# Patient Record
Sex: FEMALE | Race: BLACK OR AFRICAN AMERICAN | Employment: OTHER | URBAN - METROPOLITAN AREA
[De-identification: names, ages, dates, MRNs, and addresses within clinical notes are randomized per-mention and may not be internally consistent; named-entity substitution may affect disease eponyms.]

---

## 2018-10-25 ENCOUNTER — OP HISTORICAL/CONVERTED ENCOUNTER (OUTPATIENT)
Dept: OTHER | Age: 63
End: 2018-10-25

## 2018-11-07 ENCOUNTER — OP HISTORICAL/CONVERTED ENCOUNTER (OUTPATIENT)
Dept: OTHER | Age: 63
End: 2018-11-07

## 2020-01-21 ENCOUNTER — OP HISTORICAL/CONVERTED ENCOUNTER (OUTPATIENT)
Dept: OTHER | Age: 65
End: 2020-01-21

## 2021-08-05 ENCOUNTER — HOSPITAL ENCOUNTER (EMERGENCY)
Age: 66
Discharge: HOME | End: 2021-08-05
Attending: EMERGENCY MEDICINE
Payer: COMMERCIAL

## 2021-08-05 VITALS
RESPIRATION RATE: 18 BRPM | HEIGHT: 61 IN | HEART RATE: 64 BPM | SYSTOLIC BLOOD PRESSURE: 134 MMHG | TEMPERATURE: 99 F | BODY MASS INDEX: 28.13 KG/M2 | WEIGHT: 149 LBS | DIASTOLIC BLOOD PRESSURE: 74 MMHG | OXYGEN SATURATION: 99 %

## 2021-08-05 DIAGNOSIS — R79.89 ELEVATED SERUM CREATININE: ICD-10-CM

## 2021-08-05 DIAGNOSIS — N13.9 OBSTRUCTIVE UROPATHY: Primary | ICD-10-CM

## 2021-08-05 LAB
ANION GAP SERPL CALC-SCNC: 7 MMOL/L (ref 5–15)
APPEARANCE UR: ABNORMAL
BACTERIA URNS QL MICRO: NEGATIVE /HPF
BACTERIA URNS QL MICRO: NEGATIVE /HPF
BASOPHILS # BLD: 0 K/UL (ref 0–0.1)
BASOPHILS NFR BLD: 0 % (ref 0–1)
BILIRUB UR QL: NEGATIVE
BUN SERPL-MCNC: 47 MG/DL (ref 6–20)
BUN/CREAT SERPL: 9 (ref 12–20)
CA-I BLD-MCNC: 9 MG/DL (ref 8.5–10.1)
CHLORIDE SERPL-SCNC: 111 MMOL/L (ref 97–108)
CO2 SERPL-SCNC: 22 MMOL/L (ref 21–32)
COLOR UR: ABNORMAL
CREAT SERPL-MCNC: 5.51 MG/DL (ref 0.55–1.02)
DIFFERENTIAL METHOD BLD: ABNORMAL
EOSINOPHIL # BLD: 0.1 K/UL (ref 0–0.4)
EOSINOPHIL NFR BLD: 1 % (ref 0–7)
ERYTHROCYTE [DISTWIDTH] IN BLOOD BY AUTOMATED COUNT: 13.8 % (ref 11.5–14.5)
GLUCOSE SERPL-MCNC: 85 MG/DL (ref 65–100)
GLUCOSE UR STRIP.AUTO-MCNC: NEGATIVE MG/DL
HCT VFR BLD AUTO: 32.7 % (ref 35–47)
HGB BLD-MCNC: 10.5 G/DL (ref 11.5–16)
HGB UR QL STRIP: NEGATIVE
IMM GRANULOCYTES # BLD AUTO: 0 K/UL (ref 0–0.04)
IMM GRANULOCYTES NFR BLD AUTO: 0 % (ref 0–0.5)
KETONES UR QL STRIP.AUTO: NEGATIVE MG/DL
LEUKOCYTE ESTERASE UR QL STRIP.AUTO: ABNORMAL
LYMPHOCYTES # BLD: 2.8 K/UL (ref 0.8–3.5)
LYMPHOCYTES NFR BLD: 34 % (ref 12–49)
MCH RBC QN AUTO: 26.4 PG (ref 26–34)
MCHC RBC AUTO-ENTMCNC: 32.1 G/DL (ref 30–36.5)
MCV RBC AUTO: 82.2 FL (ref 80–99)
MONOCYTES # BLD: 0.5 K/UL (ref 0–1)
MONOCYTES NFR BLD: 6 % (ref 5–13)
NEUTS SEG # BLD: 5 K/UL (ref 1.8–8)
NEUTS SEG NFR BLD: 59 % (ref 32–75)
NITRITE UR QL STRIP.AUTO: NEGATIVE
NRBC # BLD: 0 K/UL (ref 0–0.01)
NRBC BLD-RTO: 0 PER 100 WBC
PH UR STRIP: 8 [PH] (ref 5–8)
PLATELET # BLD AUTO: 370 K/UL (ref 150–400)
PMV BLD AUTO: 11.4 FL (ref 8.9–12.9)
POTASSIUM SERPL-SCNC: 6 MMOL/L (ref 3.5–5.1)
POTASSIUM SERPL-SCNC: 6 MMOL/L (ref 3.5–5.1)
POTASSIUM SERPL-SCNC: 6.3 MMOL/L (ref 3.5–5.1)
PROT UR STRIP-MCNC: 100 MG/DL
RBC # BLD AUTO: 3.98 M/UL (ref 3.8–5.2)
RBC #/AREA URNS HPF: ABNORMAL /HPF (ref 0–5)
RBC #/AREA URNS HPF: ABNORMAL /HPF (ref 0–5)
SODIUM SERPL-SCNC: 140 MMOL/L (ref 136–145)
SP GR UR REFRACTOMETRY: 1.01 (ref 1–1.03)
UROBILINOGEN UR QL STRIP.AUTO: 0.1 EU/DL (ref 0.1–1)
WBC # BLD AUTO: 8.4 K/UL (ref 3.6–11)
WBC URNS QL MICRO: >100 /HPF (ref 0–4)
WBC URNS QL MICRO: >100 /HPF (ref 0–4)

## 2021-08-05 PROCEDURE — 84132 ASSAY OF SERUM POTASSIUM: CPT

## 2021-08-05 PROCEDURE — 85025 COMPLETE CBC W/AUTO DIFF WBC: CPT

## 2021-08-05 PROCEDURE — 99284 EMERGENCY DEPT VISIT MOD MDM: CPT

## 2021-08-05 PROCEDURE — 93005 ELECTROCARDIOGRAM TRACING: CPT

## 2021-08-05 PROCEDURE — 81001 URINALYSIS AUTO W/SCOPE: CPT

## 2021-08-05 PROCEDURE — 36415 COLL VENOUS BLD VENIPUNCTURE: CPT

## 2021-08-05 NOTE — ED PROVIDER NOTES
EMERGENCY DEPARTMENT HISTORY AND PHYSICAL EXAM      Date: 8/5/2021  Patient Name: Cesia Verdin    History of Presenting Illness     Chief Complaint   Patient presents with    Urinary Retention       History Provided By: Patient    HPI: Cesia Verdin, 78 y.o. female with a past medical history significant hypertension, renal insufficiency and CHF presents to the ED with cc of urinary retention. Patient states she was told she is not completely emptying her bladder. She does feel the urge to urinate and is able to pass urine. She went to see her nephrologist, Dr. Venancio Dan, and was told that she may need a Hess catheter so she is presents to the emergency department today. Per discussion with Dr. Venancio Dan, patient was admitted to an outside hospital for renal failure with a creatinine up to 4.8. At that time, her bladder volume was over 600 cc. He is concern for obstructive uropathy and that patient may not be completely emptying her bladder so sent here for post void residual to evaluate if patient would benefit from a Hess catheter placement. Patient reports otherwise being in her normal state of health. No recent illnesses. No dysuria, hematuria. There are no other complaints, changes, or physical findings at this time. PCP: Suzanne Pink MD    No current facility-administered medications on file prior to encounter. No current outpatient medications on file prior to encounter. Past History     Past Medical History:  Past Medical History:   Diagnosis Date    Heart failure ([address]     Kidney disease        Past Surgical History:  Past Surgical History:   Procedure Laterality Date    HX KNEE REPLACEMENT Right        Family History:  History reviewed. No pertinent family history.     Social History:  Social History     Tobacco Use    Smoking status: Never Smoker    Smokeless tobacco: Never Used   Substance Use Topics    Alcohol use: Never    Drug use: Never       Allergies:  No Known Allergies      Review of Systems     Review of Systems   Constitutional: Negative for activity change and fever. HENT: Negative for rhinorrhea and sore throat. Eyes: Negative for visual disturbance. Respiratory: Negative for cough. Cardiovascular: Negative for chest pain. Gastrointestinal: Negative for abdominal pain, diarrhea, nausea and vomiting. Genitourinary: Positive for decreased urine volume. Negative for difficulty urinating and dysuria. Musculoskeletal: Negative for arthralgias and myalgias. Skin: Negative for rash and wound. Neurological: Negative for syncope and headaches. Psychiatric/Behavioral: Negative for confusion. All other systems reviewed and are negative. Physical Exam     Physical Exam  Vitals and nursing note reviewed. Constitutional:       Appearance: Normal appearance. She is normal weight. HENT:      Head: Normocephalic and atraumatic. Nose: Nose normal.      Mouth/Throat:      Mouth: Mucous membranes are moist.   Eyes:      Conjunctiva/sclera: Conjunctivae normal.   Cardiovascular:      Rate and Rhythm: Normal rate. Pulses: Normal pulses. Pulmonary:      Effort: Pulmonary effort is normal. No respiratory distress. Musculoskeletal:         General: No swelling or deformity. Normal range of motion. Skin:     General: Skin is warm and dry. Findings: No rash. Neurological:      General: No focal deficit present. Mental Status: She is alert.    Psychiatric:         Mood and Affect: Mood normal.         Behavior: Behavior normal.         Lab and Diagnostic Study Results     Labs -     Recent Results (from the past 12 hour(s))   METABOLIC PANEL, BASIC    Collection Time: 08/05/21  4:15 PM   Result Value Ref Range    Sodium 140 136 - 145 mmol/L    Potassium 6.0 (H) 3.5 - 5.1 mmol/L    Chloride 111 (H) 97 - 108 mmol/L    CO2 22 21 - 32 mmol/L    Anion gap 7 5 - 15 mmol/L    Glucose 85 65 - 100 mg/dL    BUN 47 (H) 6 - 20 mg/dL    Creatinine 5.51 (H) 0.55 - 1.02 mg/dL    BUN/Creatinine ratio 9 (L) 12 - 20      GFR est AA 9 (L) >60 ml/min/1.73m2    GFR est non-AA 8 (L) >60 ml/min/1.73m2    Calcium 9.0 8.5 - 10.1 mg/dL   CBC WITH AUTOMATED DIFF    Collection Time: 08/05/21  4:15 PM   Result Value Ref Range    WBC 8.4 3.6 - 11.0 K/uL    RBC 3.98 3.80 - 5.20 M/uL    HGB 10.5 (L) 11.5 - 16.0 g/dL    HCT 32.7 (L) 35.0 - 47.0 %    MCV 82.2 80.0 - 99.0 FL    MCH 26.4 26.0 - 34.0 PG    MCHC 32.1 30.0 - 36.5 g/dL    RDW 13.8 11.5 - 14.5 %    PLATELET 186 489 - 337 K/uL    MPV 11.4 8.9 - 12.9 FL    NRBC 0.0 0.0  WBC    ABSOLUTE NRBC 0.00 0.00 - 0.01 K/uL    NEUTROPHILS 59 32 - 75 %    LYMPHOCYTES 34 12 - 49 %    MONOCYTES 6 5 - 13 %    EOSINOPHILS 1 0 - 7 %    BASOPHILS 0 0 - 1 %    IMMATURE GRANULOCYTES 0 0 - 0.5 %    ABS. NEUTROPHILS 5.0 1.8 - 8.0 K/UL    ABS. LYMPHOCYTES 2.8 0.8 - 3.5 K/UL    ABS. MONOCYTES 0.5 0.0 - 1.0 K/UL    ABS. EOSINOPHILS 0.1 0.0 - 0.4 K/UL    ABS. BASOPHILS 0.0 0.0 - 0.1 K/UL    ABS. IMM.  GRANS. 0.0 0.00 - 0.04 K/UL    DF AUTOMATED     URINALYSIS W/ RFLX MICROSCOPIC    Collection Time: 08/05/21  4:15 PM   Result Value Ref Range    Color Yellow/Straw      Appearance Turbid (A) Clear      Specific gravity 1.010 1.003 - 1.030      pH (UA) 8.0 5.0 - 8.0      Protein 100 (A) Negative mg/dL    Glucose Negative Negative mg/dL    Ketone Negative Negative mg/dL    Bilirubin Negative Negative      Blood Negative Negative      Urobilinogen 0.1 0.1 - 1.0 EU/dL    Nitrites Negative Negative      Leukocyte Esterase Large (A) Negative      WBC >100 (H) 0 - 4 /hpf    RBC 5-10 0 - 5 /hpf    Bacteria Negative Negative /hpf   URINE MICROSCOPIC    Collection Time: 08/05/21  4:15 PM   Result Value Ref Range    WBC >100 (H) 0 - 4 /hpf    RBC 5-10 0 - 5 /hpf    Bacteria Negative Negative /hpf   POTASSIUM    Collection Time: 08/05/21  5:45 PM   Result Value Ref Range    Potassium 6.3 (H) 3.5 - 5.1 mmol/L   POTASSIUM    Collection Time: 08/05/21  7:30 PM Result Value Ref Range    Potassium 6.0 (H) 3.5 - 5.1 mmol/L       Radiologic Studies -   @lastxrresult@  CT Results  (Last 48 hours)    None        CXR Results  (Last 48 hours)    None            Medical Decision Making   - I am the first provider for this patient. - I reviewed the vital signs, available nursing notes, past medical history, past surgical history, family history and social history. - Initial assessment performed. The patients presenting problems have been discussed, and they are in agreement with the care plan formulated and outlined with them. I have encouraged them to ask questions as they arise throughout their visit. Vital Signs-Reviewed the patient's vital signs. Patient Vitals for the past 12 hrs:   Temp Pulse Resp BP SpO2   08/05/21 2217 -- 64 18 134/74 99 %   08/05/21 2114 99 °F (37.2 °C) -- -- -- --   08/05/21 1552 99.2 °F (37.3 °C) (!) 58 16 137/72 99 %       Records Reviewed: Nursing Notes and Old Medical Records          ED Course:     ED Course as of Aug 06 0041   Thu Aug 05, 2021   128 70-year-old female who presents from nephrology clinic for possible Hess catheter placement secondary to urinary retention. Patient has a history of CKD. She was recently in the hospital 3 weeks ago. She has found then to have a bladder volume of greater than 600. Her nephrologist, Dr. Shanika Zamorano, is concerned that she may have obstructive uropathy and need a Hess catheter for incomplete bladder emptying. He is unable to get a post void residual in his office so was sent here for further evaluation. Will check labs including CBC, BMP, UA and get a postvoid residual.  If elevated, will place a Hess catheter with a leg bag and have patient follow-up closely with urology. Disposition as per clinical course.    [LW]   [phone] Patient unable to urinate. Will attempt again and get bladder volume after second attempt. [LW]   4223 Post void residual is 486mL. K is hemolyzed. Will rpt. [LW]   [phone] with Dr. Drake Toussaint. Would like Tsai placed. Feels this is likely obstructive uropathy. He has started her on medication for hyperkalemia. He will bring her back to repeat labs in a few days in his office. [LW]   2037 Patient of her work-up and results. She is upset by the severity of her renal disease. We discussed the possibility of dialysis if this does not improve but stated that we hope that they tsai will improve this. Potassium is hemolyzed x2. Will repeat. K is elevated and Dr. Drake Toussaint aware, patient started on meds for hyperkalemia. No EKG changes on initial EKG or on repeat. After repeat potassium, patient will discharge home.    [LW]   2121 Spoke w/ Dr. Drake Toussaint again about K of 6.0. No EKG changes. Patient was started on lokelma for hyperkalemia. Discharged to home.    [LW]      ED Course User Index  [LW] Martinez Andino MD         Procedures   Medical Decis      Disposition   Disposition: DC- Adult Discharges: All of the diagnostic tests were reviewed and questions answered. Diagnosis, care plan and treatment options were discussed. The patient understands the instructions and will follow up as directed. The patients results have been reviewed with them. They have been counseled regarding their diagnosis. The patient verbally convey understanding and agreement of the signs, symptoms, diagnosis, treatment and prognosis and additionally agrees to follow up as recommended with their PCP in 24 - 48 hours. They also agree with the care-plan and convey that all of their questions have been answered. I have also put together some discharge instructions for them that include: 1) educational information regarding their diagnosis, 2) how to care for their diagnosis at home, as well a 3) list of reasons why they would want to return to the ED prior to their follow-up appointment, should their condition change. DISCHARGE PLAN:  1.  There are no discharge medications for this patient. 2.   Follow-up Information     Follow up With Specialties Details Why Contact Info    Robert Quintana MD Urology Schedule an appointment as soon as possible for a visit in 1 day  [address] [facility] [address]  [phone]      Pantera Ngo MD Nephrology In 3 days  [address] [facility] [address] [address]  [phone]          3. Return to ED if worse   4. There are no discharge medications for this patient. Diagnosis     Clinical Impression:   1. Obstructive uropathy    2. Elevated serum creatinine        Attestations:    Michele Benson MD    Please note that this dictation was completed with Med Access, the computer voice recognition software. Quite often unanticipated grammatical, syntax, homophones, and other interpretive errors are inadvertently transcribed by the computer software. Please disregard these errors. Please excuse any errors that have escaped final proofreading. Thank you.

## 2021-08-05 NOTE — ED NOTES
Patient has no physical complaints. States her kidney doctor sent her over for  Urinary catheter but patient doesn't know why.  Dr. Trini Viera is nephrologist. Waiting for post residual void but patient does not need to void at this time

## 2021-08-05 NOTE — ED TRIAGE NOTES
Pt sent by Dr. Precious Willingham following appointment due to urinary retention and to have a tsai placed per pt. Pt denies pain and states she has urinated today.

## 2021-08-05 NOTE — DISCHARGE INSTRUCTIONS
Thank you! Thank you for allowing me to care for you in the emergency department. I sincerely hope that you are satisfied with your visit today. It is my goal to provide you with excellent care. Below you will find a list of your labs and imaging from your visit today. Should you have any questions regarding these results please do not hesitate to call the emergency department. Labs -     Recent Results (from the past 12 hour(s))   METABOLIC PANEL, BASIC    Collection Time: 08/05/21  4:15 PM   Result Value Ref Range    Sodium 140 136 - 145 mmol/L    Potassium 6.0 (H) 3.5 - 5.1 mmol/L    Chloride 111 (H) 97 - 108 mmol/L    CO2 22 21 - 32 mmol/L    Anion gap 7 5 - 15 mmol/L    Glucose 85 65 - 100 mg/dL    BUN 47 (H) 6 - 20 mg/dL    Creatinine 5.51 (H) 0.55 - 1.02 mg/dL    BUN/Creatinine ratio 9 (L) 12 - 20      GFR est AA 9 (L) >60 ml/min/1.73m2    GFR est non-AA 8 (L) >60 ml/min/1.73m2    Calcium 9.0 8.5 - 10.1 mg/dL   CBC WITH AUTOMATED DIFF    Collection Time: 08/05/21  4:15 PM   Result Value Ref Range    WBC 8.4 3.6 - 11.0 K/uL    RBC 3.98 3.80 - 5.20 M/uL    HGB 10.5 (L) 11.5 - 16.0 g/dL    HCT 32.7 (L) 35.0 - 47.0 %    MCV 82.2 80.0 - 99.0 FL    MCH 26.4 26.0 - 34.0 PG    MCHC 32.1 30.0 - 36.5 g/dL    RDW 13.8 11.5 - 14.5 %    PLATELET 107 196 - 306 K/uL    MPV 11.4 8.9 - 12.9 FL    NRBC 0.0 0.0  WBC    ABSOLUTE NRBC 0.00 0.00 - 0.01 K/uL    NEUTROPHILS 59 32 - 75 %    LYMPHOCYTES 34 12 - 49 %    MONOCYTES 6 5 - 13 %    EOSINOPHILS 1 0 - 7 %    BASOPHILS 0 0 - 1 %    IMMATURE GRANULOCYTES 0 0 - 0.5 %    ABS. NEUTROPHILS 5.0 1.8 - 8.0 K/UL    ABS. LYMPHOCYTES 2.8 0.8 - 3.5 K/UL    ABS. MONOCYTES 0.5 0.0 - 1.0 K/UL    ABS. EOSINOPHILS 0.1 0.0 - 0.4 K/UL    ABS. BASOPHILS 0.0 0.0 - 0.1 K/UL    ABS. IMM.  GRANS. 0.0 0.00 - 0.04 K/UL    DF AUTOMATED     URINALYSIS W/ RFLX MICROSCOPIC    Collection Time: 08/05/21  4:15 PM   Result Value Ref Range    Color Yellow/Straw      Appearance Turbid (A) Clear Specific gravity 1.010 1.003 - 1.030      pH (UA) 8.0 5.0 - 8.0      Protein 100 (A) Negative mg/dL    Glucose Negative Negative mg/dL    Ketone Negative Negative mg/dL    Bilirubin Negative Negative      Blood Negative Negative      Urobilinogen 0.1 0.1 - 1.0 EU/dL    Nitrites Negative Negative      Leukocyte Esterase Large (A) Negative      WBC >100 (H) 0 - 4 /hpf    RBC 5-10 0 - 5 /hpf    Bacteria Negative Negative /hpf   URINE MICROSCOPIC    Collection Time: 08/05/21  4:15 PM   Result Value Ref Range    WBC >100 (H) 0 - 4 /hpf    RBC 5-10 0 - 5 /hpf    Bacteria Negative Negative /hpf       Radiologic Studies -   No orders to display     CT Results  (Last 48 hours)      None          CXR Results  (Last 48 hours)      None               If you feel that you have not received excellent quality care or timely care, please ask to speak to the nurse manager. Please choose us in the future for your continued health care needs. ------------------------------------------------------------------------------------------------------------  The exam and treatment you received in the Emergency Department were for an urgent problem and are not intended as complete care. It is important that you follow-up with a doctor, nurse practitioner, or physician assistant to:  (1) confirm your diagnosis,  (2) re-evaluation of changes in your illness and treatment, and  (3) for ongoing care. If your symptoms become worse or you do not improve as expected and you are unable to reach your usual health care provider, you should return to the Emergency Department. We are available 24 hours a day. Please take your discharge instructions with you when you go to your follow-up appointment. If you have any problem arranging a follow-up appointment, contact the Emergency Department immediately. If a prescription has been provided, please have it filled as soon as possible to prevent a delay in treatment.  Read the entire medication instruction sheet provided to you by the pharmacy. If you have any questions or reservations about taking the medication due to side effects or interactions with other medications, please call your primary care physician or contact the ER to speak with the charge nurse. Make an appointment with your family doctor or the physician you were referred to for follow-up of this visit as instructed on your discharge paperwork, as this is a mandatory follow-up. Return to the ER if you are unable to be seen or if you are unable to be seen in a timely manner. If you have any problem arranging the follow-up visit, contact the Emergency Department immediately.

## 2021-08-06 LAB
ATRIAL RATE: 82 BPM
CALCULATED P AXIS, ECG09: 67 DEGREES
CALCULATED R AXIS, ECG10: -21 DEGREES
CALCULATED T AXIS, ECG11: 40 DEGREES
DIAGNOSIS, 93000: NORMAL
P-R INTERVAL, ECG05: 172 MS
Q-T INTERVAL, ECG07: 414 MS
QRS DURATION, ECG06: 84 MS
QTC CALCULATION (BEZET), ECG08: 483 MS
VENTRICULAR RATE, ECG03: 82 BPM

## 2021-08-06 NOTE — ED NOTES
Pt urinary cath switched over to leg bag, total output in drain bag is 500ml. No complaints of pain or discomfort verbalized.

## 2021-08-17 ENCOUNTER — TELEPHONE (OUTPATIENT)
Dept: UROLOGY | Age: 66
End: 2021-08-17

## 2021-08-17 PROBLEM — R33.9 URINARY RETENTION: Status: ACTIVE | Noted: 2021-08-17

## 2021-08-17 NOTE — TELEPHONE ENCOUNTER
Dr. Rajinder Melton sent patient to ER for tsai placement. Can we call to see if he has any recent imaging? US or CT? And baseline renal function (creatinine).     [phone]

## 2021-08-31 ENCOUNTER — OFFICE VISIT (OUTPATIENT)
Dept: UROLOGY | Age: 66
End: 2021-08-31
Payer: COMMERCIAL

## 2021-08-31 VITALS
WEIGHT: 144 LBS | OXYGEN SATURATION: 100 % | HEIGHT: 61 IN | DIASTOLIC BLOOD PRESSURE: 88 MMHG | BODY MASS INDEX: 27.19 KG/M2 | SYSTOLIC BLOOD PRESSURE: 152 MMHG | HEART RATE: 78 BPM

## 2021-08-31 DIAGNOSIS — N18.4 STAGE 4 CHRONIC KIDNEY DISEASE (HCC): ICD-10-CM

## 2021-08-31 DIAGNOSIS — R33.9 URINARY RETENTION: Primary | ICD-10-CM

## 2021-08-31 PROCEDURE — 99244 OFF/OP CNSLTJ NEW/EST MOD 40: CPT | Performed by: UROLOGY

## 2021-08-31 RX ORDER — HYDROCHLOROTHIAZIDE 12.5 MG/1
12.5 CAPSULE ORAL DAILY
COMMUNITY

## 2021-08-31 RX ORDER — FERROUS SULFATE 324(65)MG
TABLET, DELAYED RELEASE (ENTERIC COATED) ORAL
COMMUNITY
End: 2021-09-01 | Stop reason: DRUGHIGH

## 2021-08-31 RX ORDER — ERGOCALCIFEROL 1.25 MG/1
50000 CAPSULE ORAL
COMMUNITY
End: 2022-01-13

## 2021-08-31 RX ORDER — SODIUM BICARBONATE 325 MG/1
325 TABLET ORAL 4 TIMES DAILY
COMMUNITY
End: 2021-09-01 | Stop reason: DRUGHIGH

## 2021-08-31 RX ORDER — BETHANECHOL CHLORIDE 10 MG/1
TABLET ORAL
COMMUNITY
End: 2021-09-01 | Stop reason: DRUGHIGH

## 2021-08-31 RX ORDER — ASPIRIN 81 MG/1
TABLET ORAL DAILY
COMMUNITY

## 2021-08-31 RX ORDER — FUROSEMIDE 20 MG/1
TABLET ORAL DAILY
COMMUNITY
End: 2021-09-01 | Stop reason: DRUGHIGH

## 2021-08-31 NOTE — ASSESSMENT & PLAN NOTE
She has CKD and incomplete emptying. She has a tsai. I recommend evaluation with cystoscopy, uroflow, PVR. Catheter change today.

## 2021-08-31 NOTE — LETTER
8/31/2021    Patient: Anabela Deutsch   YOB: 1955   Date of Visit: 8/31/2021     Paramjit Telles MD  [facility] [address]  [facility] [address] [facility] [address]  Via Fax: [address] [place], [place]  [address] [place] [address]  Via Fax: [phone]    Dear MD Darrius Yusuf Arm, MD,      Thank you for referring Ms. Erica Tomas to [facility] for evaluation. My notes for this consultation are attached. If you have questions, please do not hesitate to call me. I look forward to following your patient along with you.       Sincerely,    Lili Varela MD

## 2021-08-31 NOTE — PROGRESS NOTES
HISTORY OF PRESENT ILLNESS  Tanya Amdaor is a 78 y.o. female. Chief Complaint   Patient presents with    New Patient    Urinary Retention     She has a tsai catheter. past medical history significant hypertension, renal insufficiency and CHF presents to the ED 8/6/21 with cc of urinary retention. Patient states she was told she is not completely emptying her bladder. She does feel the urge to urinate and is able to pass urine. She went to see her nephrologist, Dr. Gil Hanna, and was told that she may need a Tsai catheter so she is presents to the emergency department     patient was admitted to an outside hospital for renal failure with a creatinine up to 4.8. At that time, her bladder volume was over 600 cc. He is concern for obstructive uropathy. Cr 5.51 at the ER. She states she was voiding prior to the tsai and did not feel like she could not. She was started on bethanechol a while ago and cannot recall if it changed her voiding. She is also on furosemide and HCTZ every other day. She works in [employer]. Chronic Conditions Addressed Today     1. Urinary retention - Primary     Overview      Patient sent to ER by Dr. Gil Hanna, Nephrology for tsai catheter placement. Per record review, patient was admitted to an outside hospital for renal failure with a creatinine up to 4.8. At that time, her bladder volume was over 600 cc. Dr. Gil Hanna expressed concern for obstructive uropathy. Creatinine 5.51 on 8/5/21. Current Assessment & Plan       She has CKD and incomplete emptying. She has a tsai. I recommend evaluation with cystoscopy, uroflow, PVR. Catheter change today. Relevant Medications     furosemide (LASIX) 20 mg tablet     ferrous sulfate 324 mg (65 mg iron) tablet     hydroCHLOROthiazide (MICROZIDE) 12.5 mg capsule     ergocalciferol (Vitamin D2) 1,250 mcg (50,000 unit) capsule     Other Relevant Orders     METABOLIC PANEL, BASIC     CT ABD PELV WO CONT    2. Stage 4 chronic kidney disease ([facility] [address]     Current Assessment & Plan      She may have obstructive uropathy. Continue tsai for now. Check BMP. Voiding studies. Relevant Medications     furosemide (LASIX) 20 mg tablet     ferrous sulfate 324 mg (65 mg iron) tablet     hydroCHLOROthiazide (MICROZIDE) 12.5 mg capsule     ergocalciferol (Vitamin D2) 1,250 mcg (50,000 unit) capsule     Other Relevant Orders     CT ABD PELV WO CONT          Past Medical History:    PMHx (including negatives):  has a past medical history of Heart failure ([facility] [address] Hypertension, and Kidney disease. PSurgHx:  has a past surgical history that includes hx knee replacement (Right). PSocHx:  reports that she has quit smoking. She quit after 10.00 years of use. She has never used smokeless tobacco. She reports previous alcohol use. She reports that she does not use drugs. Review of Systems   All other systems reviewed and are negative. Physical Exam  Vitals reviewed. Constitutional:       General: She is not in acute distress. Appearance: Normal appearance. She is obese. She is not ill-appearing, toxic-appearing or diaphoretic. HENT:      Head: Normocephalic and atraumatic. Nose: Nose normal.   Eyes:      Conjunctiva/sclera: Conjunctivae normal.      Pupils: Pupils are equal, round, and reactive to light. Pulmonary:      Effort: Pulmonary effort is normal. No respiratory distress. Breath sounds: Normal breath sounds. Musculoskeletal:      Cervical back: Normal range of motion. Neurological:      General: No focal deficit present. Mental Status: She is alert and oriented to person, place, and time. Psychiatric:         Mood and Affect: Mood normal.       No Known Allergies   Prior to Admission medications    Medication Sig Start Date End Date Taking? Authorizing Provider   sodium bicarbonate 325 mg tablet Take 325 mg by mouth four (4) times daily.    Yes Provider, Historical   furosemide (LASIX) 20 mg tablet Take  by mouth daily. Yes Provider, Historical   ferrous sulfate 324 mg (65 mg iron) tablet Take  by mouth Daily (before breakfast). Yes Provider, Historical   bethanechol (URECHOLINE) 10 mg tablet Take  by mouth Before breakfast, lunch, and dinner. Yes Provider, Historical   hydroCHLOROthiazide (MICROZIDE) 12.5 mg capsule Take 12.5 mg by mouth daily. Yes Provider, Historical   aspirin delayed-release 81 mg tablet Take  by mouth daily. Yes Provider, Historical   ergocalciferol (Vitamin D2) 1,250 mcg (50,000 unit) capsule Take 50,000 Units by mouth. Yes Provider, Historical        ASSESSMENT and PLAN  Diagnoses and all orders for this visit:    1. Urinary retention  Assessment & Plan:   She has CKD and incomplete emptying. She has a tsai. I recommend evaluation with cystoscopy, uroflow, PVR. Catheter change today. Orders:  -     METABOLIC PANEL, BASIC  -     CT ABD PELV WO CONT; Future    2. Stage 4 chronic kidney disease ([facility] [address]  Assessment & Plan:  She may have obstructive uropathy. Continue tsai for now. Check BMP. Voiding studies.      Orders:  -     CT ABD PELV WO CONT; Future         Blayne Antunez MD

## 2021-08-31 NOTE — PROGRESS NOTES
Chief Complaint   Patient presents with    New Patient    Urinary Retention     1. Have you been to the ER, urgent care clinic since your last visit? Hospitalized since your last visit? Yes [facility]: [facility]     2. Have you seen or consulted any other health care providers outside of the [address] [place] [address] since your last visit? Include any pap smears or colon screening.  No  Visit Vitals  BP (!) 152/88 (BP 1 Location: Left upper arm, BP Patient Position: Sitting, BP Cuff Size: Adult)   Pulse 78   Ht 5' 1\" (1.549 m)   Wt 144 lb (65.3 kg)   SpO2 100%   BMI 27.21 kg/m²

## 2021-09-01 ENCOUNTER — TELEPHONE (OUTPATIENT)
Dept: UROLOGY | Age: 66
End: 2021-09-01

## 2021-09-01 RX ORDER — FUROSEMIDE 40 MG/1
40 TABLET ORAL DAILY
COMMUNITY
Start: 2021-07-21

## 2021-09-01 RX ORDER — BETHANECHOL CHLORIDE 50 MG/1
TABLET ORAL
COMMUNITY
Start: 2021-08-30

## 2021-09-01 RX ORDER — SODIUM BICARBONATE 650 MG/1
TABLET ORAL
COMMUNITY
Start: 2021-07-21

## 2021-09-01 RX ORDER — FERROUS SULFATE TAB 325 MG (65 MG ELEMENTAL FE) 325 (65 FE) MG
325 TAB ORAL 2 TIMES DAILY
COMMUNITY
Start: 2021-08-16

## 2021-09-01 NOTE — TELEPHONE ENCOUNTER
PT CHYNA and I called her back. She requested the dose on her medication be changed. I went in to her visit and change the dose per her. It is correct now.

## 2021-09-04 LAB
BUN SERPL-MCNC: 35 MG/DL (ref 8–27)
BUN/CREAT SERPL: 9 (ref 12–28)
CALCIUM SERPL-MCNC: 9.2 MG/DL (ref 8.7–10.3)
CHLORIDE SERPL-SCNC: 104 MMOL/L (ref 96–106)
CO2 SERPL-SCNC: 23 MMOL/L (ref 20–29)
CREAT SERPL-MCNC: 4.03 MG/DL (ref 0.57–1)
GLUCOSE SERPL-MCNC: 95 MG/DL (ref 65–99)
POTASSIUM SERPL-SCNC: 6.3 MMOL/L (ref 3.5–5.2)
SODIUM SERPL-SCNC: 139 MMOL/L (ref 134–144)

## 2021-09-09 ENCOUNTER — HOSPITAL ENCOUNTER (OUTPATIENT)
Dept: CT IMAGING | Age: 66
Discharge: HOME | End: 2021-09-09
Attending: UROLOGY
Payer: COMMERCIAL

## 2021-09-09 DIAGNOSIS — N18.4 STAGE 4 CHRONIC KIDNEY DISEASE (HCC): ICD-10-CM

## 2021-09-09 DIAGNOSIS — R33.9 URINARY RETENTION: ICD-10-CM

## 2021-09-09 PROCEDURE — 74176 CT ABD & PELVIS W/O CONTRAST: CPT

## 2021-09-20 NOTE — PROGRESS NOTES
Apt 9/23/21 for cysto, CMG, Uroflow and voiding trial.  Incomplete emptying with DUNG. Fluid attenuation exophytic hypodensity in the right kidney, upper pole  measuring 2.2 cm x 1.9 cm is consistent with a cyst.    A hyperattenuating cortical focus in the left kidney, upper pole measuring 8 mm  is possibly a cyst with proteinaceous or hemorrhagic contents. The bladder is decompressed with a Hess catheter in place. Generalized bladder  wall prominence is noted. No intraluminal calculus is seen.

## 2021-09-21 PROBLEM — N28.1 RENAL CYST: Status: ACTIVE | Noted: 2021-09-21

## 2021-09-23 ENCOUNTER — OFFICE VISIT (OUTPATIENT)
Dept: UROLOGY | Age: 66
End: 2021-09-23
Payer: COMMERCIAL

## 2021-09-23 VITALS
HEIGHT: 61 IN | TEMPERATURE: 96.1 F | HEART RATE: 96 BPM | BODY MASS INDEX: 26.43 KG/M2 | SYSTOLIC BLOOD PRESSURE: 129 MMHG | WEIGHT: 140 LBS | OXYGEN SATURATION: 100 % | DIASTOLIC BLOOD PRESSURE: 72 MMHG

## 2021-09-23 DIAGNOSIS — N30.00 ACUTE CYSTITIS WITHOUT HEMATURIA: ICD-10-CM

## 2021-09-23 DIAGNOSIS — R33.9 URINARY RETENTION: Primary | ICD-10-CM

## 2021-09-23 DIAGNOSIS — N18.4 STAGE 4 CHRONIC KIDNEY DISEASE (HCC): ICD-10-CM

## 2021-09-23 DIAGNOSIS — N28.1 RENAL CYST: ICD-10-CM

## 2021-09-23 LAB
AVG FLOW RATE POC: 4 ML/SECONDS
FLOW TIME POC: 28 SECONDS
MAX FLOW RATE POC: 8 ML/SECONDS
TIME TO MAX, POC: 4 SECONDS
VOIDED VOLUME POC: 111 ML
VOIDING TIME POC: 28 SECONDS

## 2021-09-23 PROCEDURE — 51741 ELECTRO-UROFLOWMETRY FIRST: CPT | Performed by: UROLOGY

## 2021-09-23 PROCEDURE — 51798 US URINE CAPACITY MEASURE: CPT | Performed by: UROLOGY

## 2021-09-23 PROCEDURE — 52000 CYSTOURETHROSCOPY: CPT | Performed by: UROLOGY

## 2021-09-23 PROCEDURE — 51725 SIMPLE CYSTOMETROGRAM: CPT | Performed by: UROLOGY

## 2021-09-23 PROCEDURE — 99214 OFFICE O/P EST MOD 30 MIN: CPT | Performed by: UROLOGY

## 2021-09-23 RX ORDER — ETHYL ALCOHOL 70 %
SOLUTION, NON-ORAL TOPICAL AS NEEDED
COMMUNITY

## 2021-09-23 RX ORDER — METOPROLOL TARTRATE 25 MG/1
TABLET, FILM COATED ORAL 2 TIMES DAILY
COMMUNITY

## 2021-09-23 RX ORDER — CHOLECALCIFEROL (VITAMIN D3) 125 MCG
CAPSULE ORAL
COMMUNITY
End: 2022-01-13

## 2021-09-23 RX ORDER — CEPHALEXIN 500 MG/1
500 CAPSULE ORAL 3 TIMES DAILY
Qty: 15 CAPSULE | Refills: 0 | Status: SHIPPED | OUTPATIENT
Start: 2021-09-23

## 2021-09-23 NOTE — LETTER
9/23/2021    Patient: Abimael Chew   YOB: 1955   Date of Visit: 9/23/2021     Leopoldo Amato, MD  [facility] [address]  [address] [address]  Via Fax: [phone]    Dear Leopoldo Amato, MD,      Thank you for referring Ms. Miguel Wilson to [place] [address] for evaluation. My notes for this consultation are attached. If you have questions, please do not hesitate to call me. I look forward to following your patient along with you.       Sincerely,    Ryan Bartlett MD

## 2021-09-23 NOTE — PROGRESS NOTES
Chief Complaint   Patient presents with    Cystoscopy    Urinary Retention     1. Have you been to the ER, urgent care clinic since your last visit? Hospitalized since your last visit? No    2. Have you seen or consulted any other health care providers outside of the [address] [place] [address] since your last visit? Include any pap smears or colon screening.  No   Visit Vitals  /72 (BP 1 Location: Right upper arm, BP Patient Position: Sitting, BP Cuff Size: Adult)   Pulse 96   Temp (!) 96.1 °F (35.6 °C) (Temporal)   Ht 5' 1\" (1.549 m)   Wt 140 lb (63.5 kg)   SpO2 100%   BMI 26.45 kg/m²

## 2021-09-23 NOTE — ASSESSMENT & PLAN NOTE
No bladder outlet obstruction. Probably some bladder atony. We will see if she can manage with timed voiding.

## 2021-09-23 NOTE — PROGRESS NOTES
HISTORY OF PRESENT ILLNESS  Cesia Verdin is a 78 y.o. female. Chief Complaint   Patient presents with    Cystoscopy    Urinary Retention     Ms. Kevin Buckley was seen at the end of August in referral from her nephrologist, Dr. Venancio Dan. She had DUNG with notable bladder volumes > 600cc. A tsai catheter was placed prior to her visit here. She had a CT done on 9/9/21 that showed bilateral renal cysts with a decompressed bladder (with Tsai) and general bladder wall prominence. She is here today for further evaluation of her voiding patterns via cysto, CMG, Uroflow. Chronic Conditions Addressed Today     1. Urinary retention - Primary     Overview      Patient sent to ER by Dr. Venancio Dan, Nephrology for tsai catheter placement. Per record review, patient was admitted to an outside hospital for renal failure with a creatinine up to 4.8. At that time, her bladder volume was over 600 cc. Dr. Venancio Dan expressed concern for obstructive uropathy. Creatinine 5.51 on 8/5/21.    8/31/21: tsai catheter. Continue while awaiting further evaluation via cysto, CMG, Uroflow. CT 9/9/21: bilateral renal cysts noted, as well as, a decompressed bladder with Tsai catheter in place.  Generalized bladder wall prominence is noted.  No intraluminal calculus is seen. Current Assessment & Plan      She can void more than half her bladder. I recommend timed voiding. BMP today and f/u 1 week. Relevant Medications     cholecalciferol, vitamin D3, (Vitamin D3) 50 mcg (2,000 unit) tab    2. Stage 4 chronic kidney disease ([address]     Overview      8/31/21: obstructive uropathy (?); continue tsai until further evaluation with cysto, CMG, Uroflow. Current Assessment & Plan      No bladder outlet obstruction. Probably some bladder atony. We will see if she can manage with timed voiding.            Relevant Medications     cholecalciferol, vitamin D3, (Vitamin D3) 50 mcg (2,000 unit) tab     Other Relevant Orders     METABOLIC PANEL, BASIC     METABOLIC PANEL, BASIC    3. Renal cyst     Overview      CT 9/9/21: Fluid attenuation exophytic hypodensity in the right kidney, upper pole measuring 2.2 cm x 1.9 cm is consistent with a cyst.   A hyperattenuating cortical focus in the left kidney, upper pole measuring 8 mm is possibly a cyst with proteinaceous or hemorrhagic contents. Current Assessment & Plan      Hyperdense renal cyst <1cm. Follow up imaging 6-12 months. Relevant Medications     cholecalciferol, vitamin D3, (Vitamin D3) 50 mcg (2,000 unit) tab    4. Acute cystitis without hematuria     Current Assessment & Plan       Likely cystitis after catheter. Plan on Keflex. Relevant Medications     cholecalciferol, vitamin D3, (Vitamin D3) 50 mcg (2,000 unit) tab     cephALEXin (KEFLEX) 500 mg capsule               Patient denies the symptoms of COVID-19 per routine screening guidelines. ROS    Past Medical History:  PMHx (including negatives):  has a past medical history of Heart failure ([address] Hypertension, and Kidney disease. PSurgHx:  has a past surgical history that includes hx knee replacement (Right) and hx urological (09/23/2021). PSocHx:  reports that she has quit smoking. She quit after 10.00 years of use. She has never used smokeless tobacco. She reports previous alcohol use. She reports that she does not use drugs. Physical Exam    ASSESSMENT and PLAN  Diagnoses and all orders for this visit:    1. Urinary retention  Assessment & Plan:  She can void more than half her bladder. I recommend timed voiding. BMP today and f/u 1 week. 2. Stage 4 chronic kidney disease ([address]  Assessment & Plan:  No bladder outlet obstruction. Probably some bladder atony. We will see if she can manage with timed voiding. Orders:  -     METABOLIC PANEL, BASIC  -     METABOLIC PANEL, BASIC; Future    3. Renal cyst  Assessment & Plan:  Hyperdense renal cyst <1cm.   Follow up imaging 6-12 months. 4. Acute cystitis without hematuria  Assessment & Plan:   Likely cystitis after catheter. Plan on Keflex. Other orders  -     cephALEXin (KEFLEX) 500 mg capsule; Take 1 Capsule by mouth three (3) times daily. She has a Renal function panel ordered by her nephrologist tomorrow. I will have her do another next week to follow the trend after tsai removal.       Follow-up and Dispositions    · Return in about 3 months (around 12/23/2021).    Follow-up and Disposition History          Blaine Armendariz MD

## 2021-09-23 NOTE — PROGRESS NOTES
Cystoscopy - Female    Findings:  Initial residual: minimal  Anterior urethra: mucosal polyps noted  Bladder neck: Normal appearing  Bladder mucosa: Intact, no bas fond deformity, did not descend with strain  Trabeculation: none  Diverticula: none  Ureteral orifices: normal, efflux clear urine    CMG    Initial urge at (cc): 150  Strong urge at (cc): 250  Findings: No uninhibited contractions noted. No urge related incontinence noted    Uroflow/ PVR    Max Flow: 8 ml/sec    Avg flow: 4 ml/sec    Voided Volume:  111ml    Residual Volume:76ml    Shape of the curve: flattened curve    Impression: bladder capacity normal, low flow with some strain pattern. On bethanechol.   Moderate residual.  Possible detrusor insufficiency

## 2021-09-24 LAB — PVR POC: 76 CC

## 2021-09-30 DIAGNOSIS — N18.4 STAGE 4 CHRONIC KIDNEY DISEASE (HCC): ICD-10-CM

## 2021-10-05 DIAGNOSIS — N18.4 STAGE 4 CHRONIC KIDNEY DISEASE (HCC): Primary | ICD-10-CM

## 2021-10-05 LAB
BUN SERPL-MCNC: 41 MG/DL (ref 8–27)
BUN/CREAT SERPL: 9 (ref 12–28)
CALCIUM SERPL-MCNC: 8.4 MG/DL (ref 8.7–10.3)
CHLORIDE SERPL-SCNC: 101 MMOL/L (ref 96–106)
CO2 SERPL-SCNC: 24 MMOL/L (ref 20–29)
CREAT SERPL-MCNC: 4.67 MG/DL (ref 0.57–1)
GLUCOSE SERPL-MCNC: 92 MG/DL (ref 65–99)
POTASSIUM SERPL-SCNC: 5.4 MMOL/L (ref 3.5–5.2)
SODIUM SERPL-SCNC: 143 MMOL/L (ref 134–144)

## 2021-10-05 NOTE — PROGRESS NOTES
Unable to reach patient. LVM advising on timed voiding with repeat lab. Plan for lab next week with follow up after. Niya- we will need to schedule this. If she does lab on Wednesday, let's aim for Friday apt if you can schedule that.

## 2021-10-11 DIAGNOSIS — N18.4 STAGE 4 CHRONIC KIDNEY DISEASE (HCC): ICD-10-CM

## 2021-11-02 LAB
BUN SERPL-MCNC: 50 MG/DL (ref 8–27)
BUN/CREAT SERPL: 12 (ref 12–28)
CALCIUM SERPL-MCNC: 7.6 MG/DL (ref 8.7–10.3)
CHLORIDE SERPL-SCNC: 103 MMOL/L (ref 96–106)
CO2 SERPL-SCNC: 20 MMOL/L (ref 20–29)
CREAT SERPL-MCNC: 4.08 MG/DL (ref 0.57–1)
GLUCOSE SERPL-MCNC: 79 MG/DL (ref 65–99)
POTASSIUM SERPL-SCNC: 5 MMOL/L (ref 3.5–5.2)
SODIUM SERPL-SCNC: 139 MMOL/L (ref 134–144)

## 2021-11-02 NOTE — PROGRESS NOTES
Renal function is stable after tsai removal.  Have her follow up with Nephrologist as planned, here in 3 months. If nephrologist sees renal function trending upward, have her come in here sooner. She is hard to reach. You may have to leave a voicemail.

## 2022-01-13 ENCOUNTER — OFFICE VISIT (OUTPATIENT)
Dept: UROLOGY | Age: 67
End: 2022-01-13
Payer: COMMERCIAL

## 2022-01-13 VITALS
OXYGEN SATURATION: 97 % | WEIGHT: 135 LBS | DIASTOLIC BLOOD PRESSURE: 87 MMHG | HEIGHT: 59 IN | BODY MASS INDEX: 27.21 KG/M2 | SYSTOLIC BLOOD PRESSURE: 143 MMHG | TEMPERATURE: 96.9 F | HEART RATE: 86 BPM | RESPIRATION RATE: 22 BRPM

## 2022-01-13 DIAGNOSIS — N30.00 ACUTE CYSTITIS WITHOUT HEMATURIA: ICD-10-CM

## 2022-01-13 DIAGNOSIS — R33.9 URINARY RETENTION: Primary | ICD-10-CM

## 2022-01-13 DIAGNOSIS — R33.9 INCOMPLETE EMPTYING OF BLADDER: ICD-10-CM

## 2022-01-13 DIAGNOSIS — N28.1 RENAL CYST: ICD-10-CM

## 2022-01-13 DIAGNOSIS — N18.4 STAGE 4 CHRONIC KIDNEY DISEASE (HCC): ICD-10-CM

## 2022-01-13 LAB
BILIRUB UR QL STRIP: NEGATIVE
GLUCOSE UR-MCNC: NEGATIVE MG/DL
KETONES P FAST UR STRIP-MCNC: NEGATIVE MG/DL
PH UR STRIP: 7 [PH] (ref 4.6–8)
PROT UR QL STRIP: NORMAL
PVR POC: 309 CC
PVR POC: 327 CC
SP GR UR STRIP: 1.01 (ref 1–1.03)
UA UROBILINOGEN AMB POC: NORMAL (ref 0.2–1)
URINALYSIS CLARITY POC: CLEAR
URINALYSIS COLOR POC: YELLOW
URINE BLOOD POC: NORMAL
URINE LEUKOCYTES POC: NORMAL
URINE NITRITES POC: NEGATIVE

## 2022-01-13 PROCEDURE — 99214 OFFICE O/P EST MOD 30 MIN: CPT | Performed by: UROLOGY

## 2022-01-13 PROCEDURE — 51798 US URINE CAPACITY MEASURE: CPT | Performed by: UROLOGY

## 2022-01-13 PROCEDURE — 81003 URINALYSIS AUTO W/O SCOPE: CPT | Performed by: UROLOGY

## 2022-01-13 RX ORDER — CINACALCET 30 MG/1
TABLET, FILM COATED ORAL
COMMUNITY
Start: 2021-11-15

## 2022-01-13 RX ORDER — CALCITRIOL 0.25 UG/1
CAPSULE ORAL
COMMUNITY
Start: 2022-01-07

## 2022-01-13 RX ORDER — FERRIC CITRATE 210 MG/1
TABLET, COATED ORAL
COMMUNITY
Start: 2022-01-07

## 2022-01-13 NOTE — LETTER
1/13/2022    Patient: Gael Lo   YOB: 1955   Date of Visit: 1/13/2022     Sherial Fothergill, MD  [address] [place] [id number]  Via Fax: [phone]    Dear Sherial Fothergill, MD,      Thank you for referring Ms. Joseluis Stephens to [place] [address] for evaluation. My notes for this consultation are attached. If you have questions, please do not hesitate to call me. I look forward to following your patient along with you.       Sincerely,    Zane Foster MD

## 2022-01-13 NOTE — ASSESSMENT & PLAN NOTE
She has history of urinary retention and CKD and is on bethanechol. She is also on Lasix and hydrochlorothiazide. She has chronic incomplete emptying however her kidney function has been stable. She will call with concerns.

## 2022-01-13 NOTE — PROGRESS NOTES
1. Have you been to the ER, urgent care clinic since your last visit? Hospitalized since your last visit? No    2. Have you seen or consulted any other health care providers outside of the [address] [place] [address] since your last visit? Include any pap smears or colon screening.  No  Chief Complaint   Patient presents with    Follow-up    Urinary Retention    Renal Cyst     Visit Vitals  BP (!) 143/87 (BP 1 Location: Left arm, BP Patient Position: Sitting, BP Cuff Size: Adult)   Pulse 86   Temp 96.9 °F (36.1 °C) (Temporal)   Resp 22   Ht 4' 11\" (1.499 m)   Wt 135 lb (61.2 kg)   SpO2 97%   BMI 27.27 kg/m²

## 2022-01-13 NOTE — PROGRESS NOTES
HISTORY OF PRESENT ILLNESS  Ciara Orr is a 78 y.o. female. Chief Complaint   Patient presents with    Follow-up    Urinary Retention    Renal Cyst     Past Medical History:  PMHx (including negatives):  has a past medical history of Heart failure ([address] Hypertension, and Kidney disease. PSurgHx:  has a past surgical history that includes hx knee replacement (Right) and hx urological (09/23/2021). PSocHx:  reports that she has quit smoking. She quit after 10.00 years of use. She has never used smokeless tobacco. She reports previous alcohol use. She reports that she does not use drugs. She had DUNG with notable bladder volumes > 600cc. A tsai catheter was placed prior to her visit here. She had a CT done on 9/9/21 that showed bilateral renal cysts with a decompressed bladder (with Tsai) and general bladder wall prominence. Today her initial bladder scan prior to voiding is 327 mls. She has no real urge to void. She has no voiding discomfort. She denies frequency, urgency or dysuria. She tells me she \"feels the same as I always do. \"  Post void residual was 309 cc.  UA dip today with trace blood and small leukocytes. Urine culture was sent        Chronic Conditions Addressed Today     1. Urinary retention - Primary     Overview      Patient sent to ER by Dr. Francisco Nguyen, Nephrology for tsai catheter placement. Per record review, patient was admitted to an outside hospital for renal failure with a creatinine up to 4.8. At that time, her bladder volume was over 600 cc. Dr. Francisco Nguyen expressed concern for obstructive uropathy. Creatinine 5.51 on 8/5/21.    8/31/21: tsai catheter. Continue while awaiting further evaluation via cysto, CMG, Uroflow. CT 9/9/21: bilateral renal cysts noted, as well as, a decompressed bladder with Tsai catheter in place.  Generalized bladder wall prominence is noted.  No intraluminal calculus is seen. 9/23/21: she can void more than half of her bladder capacity. Instructed on timed voiding. Creatinine ranges 4.03 to 5. 51. Relevant Medications     Auryxia 210 mg iron tablet     calcitRIOL (ROCALTROL) 0.25 mcg capsule     cinacalcet (SENSIPAR) 30 mg tablet    2. Stage 4 chronic kidney disease ([address]     Overview      8/5/21 creatinine was 5.51    8/31/21: obstructive uropathy (?); she is able to void half of her bladder capacity; advised on timed voiding. 9/3/21 creatinine was 4.03    10/4/21 creatinine was 4.67    11/1/21 creatinine was 4.08            Relevant Medications     Auryxia 210 mg iron tablet     calcitRIOL (ROCALTROL) 0.25 mcg capsule     cinacalcet (SENSIPAR) 30 mg tablet    3. Renal cyst     Overview      CT 9/9/21: Fluid attenuation exophytic hypodensity in the right kidney, upper pole measuring 2.2 cm x 1.9 cm is consistent with a cyst.   A hyperattenuating cortical focus in the left kidney, upper pole measuring 8 mm is possibly a cyst with proteinaceous or hemorrhagic contents. Relevant Medications     Auryxia 210 mg iron tablet     calcitRIOL (ROCALTROL) 0.25 mcg capsule     cinacalcet (SENSIPAR) 30 mg tablet    4. Acute cystitis without hematuria     Overview      9/23/21: likely cystitis after tsai catheter. Plan on keflex. Relevant Medications     Auryxia 210 mg iron tablet     calcitRIOL (ROCALTROL) 0.25 mcg capsule     cinacalcet (SENSIPAR) 30 mg tablet               Patient denies the symptoms of COVID-19 per routine screening guidelines. Review of Systems   Constitutional: Negative for chills and fever. HENT: Negative for congestion, sinus pain and sore throat. Respiratory: Negative for sputum production and shortness of breath. Genitourinary: Negative for dysuria, flank pain, frequency, hematuria and urgency. Musculoskeletal: Negative for back pain. Past Medical History:  PMHx (including negatives):  has a past medical history of Heart failure ([address] Hypertension, and Kidney disease.    PSurgHx:  has a past surgical history that includes hx knee replacement (Right) and hx urological (09/23/2021). PSocHx:  reports that she has quit smoking. She quit after 10.00 years of use. She has never used smokeless tobacco. She reports previous alcohol use. She reports that she does not use drugs. Physical Exam    ASSESSMENT and PLAN  Diagnoses and all orders for this visit:    1. Urinary retention  Assessment & Plan:   She has history of urinary retention and CKD and is on bethanechol. She is also on Lasix and hydrochlorothiazide. She has chronic incomplete emptying however her kidney function has been stable. She will call with concerns. Orders:  -     AMB POC PVR, JERRICA,POST-VOID RES,US,NON-IMAGING  -     CULTURE, URINE  -     AMB POC URINALYSIS DIP STICK AUTO W/O MICRO    2. Stage 4 chronic kidney disease (HCC)  Assessment & Plan:  Her renal function is relatively stable. She follows with Dr. Leslee George. 3. Renal cyst    4. Acute cystitis without hematuria  Assessment & Plan:  UA dip with blood and small leukocytes. She has incomplete emptying. She is asymptomatic. Possible contamination. We can hold off on antibiotics in the symptomatic. 5. Incomplete emptying of bladder  Assessment & Plan:   She has history of urinary retention and CKD and is on bethanechol. She is also on Lasix and hydrochlorothiazide. She has chronic incomplete emptying however her kidney function has been stable. She will call with concerns.                Juan David Forrester MD

## 2022-01-13 NOTE — ASSESSMENT & PLAN NOTE
UA dip with blood and small leukocytes. She has incomplete emptying. She is asymptomatic. Possible contamination. We can hold off on antibiotics in the symptomatic.

## 2022-01-15 LAB — BACTERIA UR CULT: NORMAL

## 2022-03-19 PROBLEM — R33.9 INCOMPLETE EMPTYING OF BLADDER: Status: ACTIVE | Noted: 2021-08-17

## 2022-03-19 PROBLEM — N18.4 STAGE 4 CHRONIC KIDNEY DISEASE (HCC): Status: ACTIVE | Noted: 2021-08-31

## 2022-03-19 PROBLEM — N30.00 ACUTE CYSTITIS WITHOUT HEMATURIA: Status: ACTIVE | Noted: 2021-09-23

## 2022-03-19 PROBLEM — N28.1 RENAL CYST: Status: ACTIVE | Noted: 2021-09-21

## 2022-07-22 ENCOUNTER — TRANSCRIBE ORDER (OUTPATIENT)
Dept: SCHEDULING | Age: 67
End: 2022-07-22

## 2022-07-22 DIAGNOSIS — N31.9 HIGH COMPLIANCE BLADDER: ICD-10-CM

## 2022-07-22 DIAGNOSIS — N18.6 END STAGE RENAL DISEASE (HCC): Primary | ICD-10-CM

## 2022-07-22 DIAGNOSIS — R80.9 PROTEINURIA: ICD-10-CM

## 2022-07-22 DIAGNOSIS — R33.9 RETENTION OF URINE, UNSPECIFIED: ICD-10-CM

## 2022-08-24 ENCOUNTER — HOSPITAL ENCOUNTER (OUTPATIENT)
Dept: ULTRASOUND IMAGING | Age: 67
Discharge: HOME | End: 2022-08-24
Payer: COMMERCIAL

## 2022-08-24 DIAGNOSIS — R80.9 PROTEINURIA: ICD-10-CM

## 2022-08-24 DIAGNOSIS — R33.9 RETENTION OF URINE, UNSPECIFIED: ICD-10-CM

## 2022-08-24 DIAGNOSIS — N18.6 END STAGE RENAL DISEASE (HCC): ICD-10-CM

## 2022-08-24 DIAGNOSIS — N31.9 HIGH COMPLIANCE BLADDER: ICD-10-CM

## 2022-08-24 PROCEDURE — 76770 US EXAM ABDO BACK WALL COMP: CPT

## 2022-10-28 ENCOUNTER — TRANSCRIBE ORDER (OUTPATIENT)
Dept: SCHEDULING | Age: 67
End: 2022-10-28

## 2022-10-28 DIAGNOSIS — R33.9 RETENTION OF URINE, UNSPECIFIED: ICD-10-CM

## 2022-10-28 DIAGNOSIS — N31.9 HIGH COMPLIANCE BLADDER: Primary | ICD-10-CM

## 2022-11-18 ENCOUNTER — HOSPITAL ENCOUNTER (OUTPATIENT)
Dept: ULTRASOUND IMAGING | Age: 67
Discharge: HOME | End: 2022-11-18
Attending: INTERNAL MEDICINE
Payer: COMMERCIAL

## 2022-11-18 DIAGNOSIS — N31.9 HIGH COMPLIANCE BLADDER: ICD-10-CM

## 2022-11-18 DIAGNOSIS — R33.9 RETENTION OF URINE, UNSPECIFIED: ICD-10-CM

## 2022-11-18 PROCEDURE — 51798 US URINE CAPACITY MEASURE: CPT

## 2023-02-24 ENCOUNTER — HOSPITAL ENCOUNTER (INPATIENT)
Age: 68
LOS: 3 days | Discharge: HOME HEALTH | DRG: 177 | End: 2023-02-27
Attending: EMERGENCY MEDICINE | Admitting: INTERNAL MEDICINE
Payer: COMMERCIAL

## 2023-02-24 ENCOUNTER — APPOINTMENT (OUTPATIENT)
Dept: GENERAL RADIOLOGY | Age: 68
DRG: 177 | End: 2023-02-24
Attending: EMERGENCY MEDICINE
Payer: COMMERCIAL

## 2023-02-24 ENCOUNTER — APPOINTMENT (OUTPATIENT)
Dept: CT IMAGING | Age: 68
DRG: 177 | End: 2023-02-24
Attending: EMERGENCY MEDICINE
Payer: COMMERCIAL

## 2023-02-24 DIAGNOSIS — N18.6 ESRD ON DIALYSIS (HCC): ICD-10-CM

## 2023-02-24 DIAGNOSIS — J90 PLEURAL EFFUSION: Primary | ICD-10-CM

## 2023-02-24 DIAGNOSIS — Z99.2 ESRD ON DIALYSIS (HCC): ICD-10-CM

## 2023-02-24 DIAGNOSIS — K56.41 FECAL IMPACTION IN RECTUM (HCC): ICD-10-CM

## 2023-02-24 PROBLEM — I21.4 NON-STEMI (NON-ST ELEVATED MYOCARDIAL INFARCTION) (HCC): Status: ACTIVE | Noted: 2023-02-24

## 2023-02-24 PROBLEM — K62.5 RECTAL BLEEDING: Status: ACTIVE | Noted: 2023-02-24

## 2023-02-24 PROBLEM — R19.5 HARD STOOL: Status: ACTIVE | Noted: 2023-02-24

## 2023-02-24 PROBLEM — E86.0 DEHYDRATION: Status: ACTIVE | Noted: 2023-02-24

## 2023-02-24 PROBLEM — R74.01 TRANSAMINITIS: Status: ACTIVE | Noted: 2023-02-24

## 2023-02-24 LAB
ABO + RH BLD: NORMAL
ALBUMIN SERPL-MCNC: 2.1 G/DL (ref 3.5–5)
ALBUMIN/GLOB SERPL: 0.5 (ref 1.1–2.2)
ALP SERPL-CCNC: 199 U/L (ref 45–117)
ALT SERPL-CCNC: 22 U/L (ref 12–78)
ANION GAP SERPL CALC-SCNC: 4 MMOL/L (ref 5–15)
APTT PPP: 29.9 SEC (ref 21.2–34.1)
AST SERPL W P-5'-P-CCNC: 51 U/L (ref 15–37)
ATRIAL RATE: 96 BPM
BASOPHILS # BLD: 0 K/UL (ref 0–0.1)
BASOPHILS NFR BLD: 0 % (ref 0–1)
BILIRUB SERPL-MCNC: 1.1 MG/DL (ref 0.2–1)
BLOOD GROUP ANTIBODIES SERPL: NEGATIVE
BNP SERPL-MCNC: ABNORMAL PG/ML
BUN SERPL-MCNC: 36 MG/DL (ref 6–20)
BUN/CREAT SERPL: 12 (ref 12–20)
CA-I BLD-MCNC: 8.5 MG/DL (ref 8.5–10.1)
CALCULATED P AXIS, ECG09: 49 DEGREES
CALCULATED R AXIS, ECG10: -4 DEGREES
CALCULATED T AXIS, ECG11: -10 DEGREES
CHLORIDE SERPL-SCNC: 94 MMOL/L (ref 97–108)
CO2 SERPL-SCNC: 35 MMOL/L (ref 21–32)
CREAT SERPL-MCNC: 2.9 MG/DL (ref 0.55–1.02)
DIAGNOSIS, 93000: NORMAL
DIFFERENTIAL METHOD BLD: ABNORMAL
EOSINOPHIL # BLD: 0 K/UL (ref 0–0.4)
EOSINOPHIL NFR BLD: 0 % (ref 0–7)
ERYTHROCYTE [DISTWIDTH] IN BLOOD BY AUTOMATED COUNT: 16.8 % (ref 11.5–14.5)
GLOBULIN SER CALC-MCNC: 4.6 G/DL (ref 2–4)
GLUCOSE SERPL-MCNC: 100 MG/DL (ref 65–100)
HCT VFR BLD AUTO: 23.4 % (ref 35–47)
HGB BLD-MCNC: 7.7 G/DL (ref 11.5–16)
IMM GRANULOCYTES # BLD AUTO: 0.1 K/UL (ref 0–0.04)
IMM GRANULOCYTES NFR BLD AUTO: 1 % (ref 0–0.5)
INR PPP: 1.2 (ref 0.9–1.1)
LYMPHOCYTES # BLD: 1 K/UL (ref 0.8–3.5)
LYMPHOCYTES NFR BLD: 10 % (ref 12–49)
MCH RBC QN AUTO: 26.5 PG (ref 26–34)
MCHC RBC AUTO-ENTMCNC: 32.9 G/DL (ref 30–36.5)
MCV RBC AUTO: 80.4 FL (ref 80–99)
MONOCYTES # BLD: 0.7 K/UL (ref 0–1)
MONOCYTES NFR BLD: 7 % (ref 5–13)
NEUTS SEG # BLD: 8.6 K/UL (ref 1.8–8)
NEUTS SEG NFR BLD: 82 % (ref 32–75)
NRBC # BLD: 0 K/UL (ref 0–0.01)
NRBC BLD-RTO: 0 PER 100 WBC
P-R INTERVAL, ECG05: 182 MS
PLATELET # BLD AUTO: 261 K/UL (ref 150–400)
PMV BLD AUTO: 11.4 FL (ref 8.9–12.9)
POTASSIUM SERPL-SCNC: 4.9 MMOL/L (ref 3.5–5.1)
PROT SERPL-MCNC: 6.7 G/DL (ref 6.4–8.2)
PROTHROMBIN TIME: 15 SEC (ref 11.9–14.6)
Q-T INTERVAL, ECG07: 378 MS
QRS DURATION, ECG06: 96 MS
QTC CALCULATION (BEZET), ECG08: 477 MS
RBC # BLD AUTO: 2.91 M/UL (ref 3.8–5.2)
SODIUM SERPL-SCNC: 133 MMOL/L (ref 136–145)
SPECIMEN EXP DATE BLD: NORMAL
THERAPEUTIC RANGE,PTTT: NORMAL SEC (ref 82–109)
TROPONIN I SERPL HS-MCNC: 84 NG/L (ref 0–51)
VENTRICULAR RATE, ECG03: 96 BPM
WBC # BLD AUTO: 10.5 K/UL (ref 3.6–11)

## 2023-02-24 PROCEDURE — 74011250637 HC RX REV CODE- 250/637: Performed by: NURSE PRACTITIONER

## 2023-02-24 PROCEDURE — 99285 EMERGENCY DEPT VISIT HI MDM: CPT

## 2023-02-24 PROCEDURE — 93005 ELECTROCARDIOGRAM TRACING: CPT

## 2023-02-24 PROCEDURE — 71045 X-RAY EXAM CHEST 1 VIEW: CPT

## 2023-02-24 PROCEDURE — 85025 COMPLETE CBC W/AUTO DIFF WBC: CPT

## 2023-02-24 PROCEDURE — 84484 ASSAY OF TROPONIN QUANT: CPT

## 2023-02-24 PROCEDURE — 74011000636 HC RX REV CODE- 636: Performed by: EMERGENCY MEDICINE

## 2023-02-24 PROCEDURE — 74011250636 HC RX REV CODE- 250/636: Performed by: NURSE PRACTITIONER

## 2023-02-24 PROCEDURE — 83880 ASSAY OF NATRIURETIC PEPTIDE: CPT

## 2023-02-24 PROCEDURE — 86900 BLOOD TYPING SEROLOGIC ABO: CPT

## 2023-02-24 PROCEDURE — 74011000250 HC RX REV CODE- 250: Performed by: NURSE PRACTITIONER

## 2023-02-24 PROCEDURE — 74011000250 HC RX REV CODE- 250: Performed by: EMERGENCY MEDICINE

## 2023-02-24 PROCEDURE — 85610 PROTHROMBIN TIME: CPT

## 2023-02-24 PROCEDURE — 74178 CT ABD&PLV WO CNTR FLWD CNTR: CPT

## 2023-02-24 PROCEDURE — 65270000029 HC RM PRIVATE

## 2023-02-24 PROCEDURE — 80053 COMPREHEN METABOLIC PANEL: CPT

## 2023-02-24 PROCEDURE — 85730 THROMBOPLASTIN TIME PARTIAL: CPT

## 2023-02-24 PROCEDURE — 36415 COLL VENOUS BLD VENIPUNCTURE: CPT

## 2023-02-24 RX ORDER — CARVEDILOL 12.5 MG/1
12.5 TABLET ORAL 2 TIMES DAILY
COMMUNITY

## 2023-02-24 RX ORDER — BISACODYL 5 MG
5 TABLET, DELAYED RELEASE (ENTERIC COATED) ORAL DAILY PRN
Status: DISCONTINUED | OUTPATIENT
Start: 2023-02-24 | End: 2023-02-27 | Stop reason: HOSPADM

## 2023-02-24 RX ORDER — SODIUM CHLORIDE 9 MG/ML
100 INJECTION, SOLUTION INTRAVENOUS CONTINUOUS
Status: DISPENSED | OUTPATIENT
Start: 2023-02-24 | End: 2023-02-25

## 2023-02-24 RX ORDER — GLYCERIN 1 G/1
1 SUPPOSITORY RECTAL
Status: DISPENSED | OUTPATIENT
Start: 2023-02-24 | End: 2023-02-25

## 2023-02-24 RX ORDER — SEVELAMER CARBONATE 800 MG/1
1600 TABLET, FILM COATED ORAL 3 TIMES DAILY
Status: DISCONTINUED | OUTPATIENT
Start: 2023-02-24 | End: 2023-02-27 | Stop reason: HOSPADM

## 2023-02-24 RX ORDER — ONDANSETRON 4 MG/1
4 TABLET, ORALLY DISINTEGRATING ORAL
Status: DISCONTINUED | OUTPATIENT
Start: 2023-02-24 | End: 2023-02-27 | Stop reason: HOSPADM

## 2023-02-24 RX ORDER — ONDANSETRON 2 MG/ML
4 INJECTION INTRAMUSCULAR; INTRAVENOUS
Status: DISCONTINUED | OUTPATIENT
Start: 2023-02-24 | End: 2023-02-27 | Stop reason: HOSPADM

## 2023-02-24 RX ORDER — ACETAMINOPHEN 325 MG/1
650 TABLET ORAL
Status: DISCONTINUED | OUTPATIENT
Start: 2023-02-24 | End: 2023-02-27 | Stop reason: HOSPADM

## 2023-02-24 RX ORDER — SODIUM CHLORIDE 0.9 % (FLUSH) 0.9 %
5-40 SYRINGE (ML) INJECTION EVERY 8 HOURS
Status: DISCONTINUED | OUTPATIENT
Start: 2023-02-24 | End: 2023-02-27 | Stop reason: HOSPADM

## 2023-02-24 RX ORDER — SEVELAMER CARBONATE 800 MG/1
1600 TABLET, FILM COATED ORAL 3 TIMES DAILY
COMMUNITY

## 2023-02-24 RX ORDER — CARVEDILOL 12.5 MG/1
12.5 TABLET ORAL 2 TIMES DAILY
Status: DISCONTINUED | OUTPATIENT
Start: 2023-02-24 | End: 2023-02-27 | Stop reason: HOSPADM

## 2023-02-24 RX ORDER — POLYETHYLENE GLYCOL 3350 17 G/17G
17 POWDER, FOR SOLUTION ORAL DAILY
Status: DISCONTINUED | OUTPATIENT
Start: 2023-02-25 | End: 2023-02-27 | Stop reason: HOSPADM

## 2023-02-24 RX ORDER — LIDOCAINE HYDROCHLORIDE 40 MG/ML
SOLUTION TOPICAL
Status: COMPLETED | OUTPATIENT
Start: 2023-02-24 | End: 2023-02-24

## 2023-02-24 RX ORDER — SODIUM CHLORIDE 0.9 % (FLUSH) 0.9 %
5-40 SYRINGE (ML) INJECTION AS NEEDED
Status: DISCONTINUED | OUTPATIENT
Start: 2023-02-24 | End: 2023-02-27 | Stop reason: HOSPADM

## 2023-02-24 RX ORDER — ACETAMINOPHEN 650 MG/1
650 SUPPOSITORY RECTAL
Status: DISCONTINUED | OUTPATIENT
Start: 2023-02-24 | End: 2023-02-27 | Stop reason: HOSPADM

## 2023-02-24 RX ADMIN — SODIUM CHLORIDE, PRESERVATIVE FREE 10 ML: 5 INJECTION INTRAVENOUS at 20:03

## 2023-02-24 RX ADMIN — ONDANSETRON 4 MG: 2 INJECTION INTRAMUSCULAR; INTRAVENOUS at 20:02

## 2023-02-24 RX ADMIN — SEVELAMER CARBONATE 1600 MG: 800 TABLET, FILM COATED ORAL at 20:03

## 2023-02-24 RX ADMIN — SODIUM CHLORIDE 100 ML/HR: 9 INJECTION, SOLUTION INTRAVENOUS at 15:16

## 2023-02-24 RX ADMIN — SODIUM CHLORIDE, PRESERVATIVE FREE 10 ML: 5 INJECTION INTRAVENOUS at 15:15

## 2023-02-24 RX ADMIN — LIDOCAINE HYDROCHLORIDE: 40 SOLUTION TOPICAL at 09:06

## 2023-02-24 RX ADMIN — IOPAMIDOL 100 ML: 755 INJECTION, SOLUTION INTRAVENOUS at 11:43

## 2023-02-24 RX ADMIN — CARVEDILOL 12.5 MG: 12.5 TABLET, FILM COATED ORAL at 20:02

## 2023-02-24 NOTE — H&P
History and Physical    Patient: Henrietta aBrry MRN: [id number]  SSN: [id number]    YOB: 1955  Age: 70 y.o. Sex: female      Subjective:      Henrietta Barry is a 70 y.o. female who has a PMH significant for CKD stage IV, unspecified heart failure, chronic pain status post intrathecal pump. She comes into the emergency room with complaints of rectal pain, unable to pass a hard stool that is located in her rectal vault. She is having bleeding from her rectum. Significant labs on admission hemoglobin is 7.7, sodium 133, chloride 94, CO2 35, anion gap 4, BUN 36, creatinine 2.9, troponin 84 and BNP greater than 35,000. Bilirubin total is 1.1, AST 51 and alkaline phos 199. Chest x-ray reveals elevated right hemidiaphragm with associated right pleural effusion. CTA of abdomen and pelvis negative for gastrointestinal hemorrhage does reveal large right-sided pleural effusion containing loculations of air there is atelectasis of the right middle and right lower lobes, small amount of free fluid in the pelvis. Will admit the patient for further management of dehydration, transaminitis, fluid overload with pleural effusion and right-sided loculated air, fecal impaction in the rectum and non-STEMI. Ordered suppository and enema with good response along with manual digital disimpaction. Will consult pulmonary, cardiology and nephrology. Past Medical History:   Diagnosis Date    Heart failure ([address]     Hypertension     Kidney disease      Past Surgical History:   Procedure Laterality Date    HX KNEE REPLACEMENT Right     HX UROLOGICAL  09/23/2021    cystospoy      Family History   Problem Relation Age of Onset    Hypertension Mother      Social History     Tobacco Use    Smoking status: Former     Years: 10.00     Types: Cigarettes    Smokeless tobacco: Never   Substance Use Topics    Alcohol use: Not Currently      Prior to Admission medications    Medication Sig Start Date End Date Taking? Authorizing Provider   sevelamer carbonate (RENVELA) 800 mg tab tab Take 1,600 mg by mouth three (3) times daily. Yes Provider, Historical   carvediloL (COREG) 12.5 mg tablet Take 12.5 mg by mouth two (2) times a day. Yes Provider, Historical        No Known Allergies    Review of Systems:  Review of Systems   Constitutional:  Positive for malaise/fatigue. Respiratory:  Negative for cough and shortness of breath. Cardiovascular:  Negative for chest pain. Gastrointestinal:  Positive for blood in stool and constipation. Musculoskeletal:  Positive for myalgias. Neurological:  Positive for weakness. Psychiatric/Behavioral:  The patient is nervous/anxious. Objective:     Recent Results (from the past 24 hour(s))   CBC WITH AUTOMATED DIFF    Collection Time: 02/24/23  7:46 AM   Result Value Ref Range    WBC 10.5 3.6 - 11.0 K/uL    RBC 2.91 (L) 3.80 - 5.20 M/uL    HGB 7.7 (L) 11.5 - 16.0 g/dL    HCT 23.4 (L) 35.0 - 47.0 %    MCV 80.4 80.0 - 99.0 FL    MCH 26.5 26.0 - 34.0 PG    MCHC 32.9 30.0 - 36.5 g/dL    RDW 16.8 (H) 11.5 - 14.5 %    PLATELET 510 564 - 561 K/uL    MPV 11.4 8.9 - 12.9 FL    NRBC 0.0 0.0  WBC    ABSOLUTE NRBC 0.00 0.00 - 0.01 K/uL    NEUTROPHILS 82 (H) 32 - 75 %    LYMPHOCYTES 10 (L) 12 - 49 %    MONOCYTES 7 5 - 13 %    EOSINOPHILS 0 0 - 7 %    BASOPHILS 0 0 - 1 %    IMMATURE GRANULOCYTES 1 (H) 0 - 0.5 %    ABS. NEUTROPHILS 8.6 (H) 1.8 - 8.0 K/UL    ABS. LYMPHOCYTES 1.0 0.8 - 3.5 K/UL    ABS. MONOCYTES 0.7 0.0 - 1.0 K/UL    ABS. EOSINOPHILS 0.0 0.0 - 0.4 K/UL    ABS. BASOPHILS 0.0 0.0 - 0.1 K/UL    ABS. IMM.  GRANS. 0.1 (H) 0.00 - 0.04 K/UL    DF AUTOMATED     METABOLIC PANEL, COMPREHENSIVE    Collection Time: 02/24/23  7:46 AM   Result Value Ref Range    Sodium 133 (L) 136 - 145 mmol/L    Potassium 4.9 3.5 - 5.1 mmol/L    Chloride 94 (L) 97 - 108 mmol/L    CO2 35 (H) 21 - 32 mmol/L    Anion gap 4 (L) 5 - 15 mmol/L    Glucose 100 65 - 100 mg/dL    BUN 36 (H) 6 - 20 mg/dL Creatinine 2.90 (H) 0.55 - 1.02 mg/dL    BUN/Creatinine ratio 12 12 - 20      eGFR 17 (L) >60 ml/min/1.73m2    Calcium 8.5 8.5 - 10.1 mg/dL    Bilirubin, total 1.1 (H) 0.2 - 1.0 mg/dL    AST (SGOT) 51 (H) 15 - 37 U/L    ALT (SGPT) 22 12 - 78 U/L    Alk. phosphatase 199 (H) 45 - 117 U/L    Protein, total 6.7 6.4 - 8.2 g/dL    Albumin 2.1 (L) 3.5 - 5.0 g/dL    Globulin 4.6 (H) 2.0 - 4.0 g/dL    A-G Ratio 0.5 (L) 1.1 - 2.2     TROPONIN-HIGH SENSITIVITY    Collection Time: 02/24/23  7:46 AM   Result Value Ref Range    Troponin-High Sensitivity 84 (H) 0 - 51 ng/L   NT-PRO BNP    Collection Time: 02/24/23  7:46 AM   Result Value Ref Range    NT pro-BNP >35,000 (H) <125 pg/mL   PTT    Collection Time: 02/24/23  7:46 AM   Result Value Ref Range    aPTT 29.9 21.2 - 34.1 sec    aPTT, therapeutic range   82 - 109 sec   PROTHROMBIN TIME + INR    Collection Time: 02/24/23  7:46 AM   Result Value Ref Range    Prothrombin time 15.0 (H) 11.9 - 14.6 sec    INR 1.2 (H) 0.9 - 1.1     TYPE & SCREEN    Collection Time: 02/24/23  7:46 AM   Result Value Ref Range    Crossmatch Expiration 02/27/2023,2359     ABO/Rh(D) Shwetha Mock Positive     Antibody screen Negative    EKG, 12 LEAD, INITIAL    Collection Time: 02/24/23  7:57 AM   Result Value Ref Range    Ventricular Rate 96 BPM    Atrial Rate 96 BPM    P-R Interval 182 ms    QRS Duration 96 ms    Q-T Interval 378 ms    QTC Calculation (Bezet) 477 ms    Calculated P Axis 49 degrees    Calculated R Axis -4 degrees    Calculated T Axis -10 degrees    Diagnosis       Sinus rhythm with occasional Premature ventricular complexes  Anterior infarct , age undetermined  Abnormal ECG  No previous ECGs available  Confirmed by Archana Rodas ([id number]) on 2/24/2023 9:42:35 AM          CT ABD PELV W WO CONT   Final Result      1. No CT evidence of active gastrointestinal hemorrhage. 2. Large right-sided pleural effusion containing loculations of air.  There is   atelectasis of the RIGHT middle and RIGHT lower lobes.      3. Small amount of free fluid in the pelvis. 4. Colonic diverticulosis. No evidence of acute diverticulitis. XR CHEST PORT   Final Result   1. Enlarged cardiac silhouette, mild interstitial edema   2. Elevation of the right hemidiaphragm with associated right pleural effusion               Vitals:    02/24/23 0736 02/24/23 0743 02/24/23 0912 02/24/23 1115   BP: 95/61  104/63 111/69   Pulse: 97  97    Resp:  18     Temp: 98.1 °F (36.7 °C)      SpO2: 98%  97% 100%   Weight: 61.2 kg (135 lb)      Height: 5' (1.524 m)           Physical Exam:  Physical Exam  Constitutional:       Appearance: She is ill-appearing. Cardiovascular:      Rate and Rhythm: Regular rhythm. Tachycardia present. Pulmonary:      Effort: No respiratory distress. Abdominal:      General: There is no distension. Musculoskeletal:      Right lower leg: No edema. Left lower leg: No edema. Skin:     General: Skin is dry. Neurological:      Motor: Weakness present. Assessment/Plan:   Active Problems:    Chronic kidney disease (CKD), stage IV (severe) ([facility] (8/31/2021)      Overview: 8/5/21 creatinine was 5.51            8/31/21: obstructive uropathy (?); she is able to void half of her bladder       capacity; advised on timed voiding.             9/3/21 creatinine was 4.03            10/4/21 creatinine was 4.67            11/1/21 creatinine was 4.08            Dehydration (2/24/2023)      Rectal bleeding (2/24/2023)      Pleural effusion (2/24/2023)      Hard stool (2/24/2023)      Non-STEMI (non-ST elevated myocardial infarction) ([facility] (2/24/2023)      Transaminitis (2/24/2023)     Non-STEMI  Elevated troponins  -Continue to trend troponins  -Denies chest pain  -EKG nonischemic  -Consult cardiology    CHF exacerbation  Elevated BNP may be related to renal l status  -No previous echo to review  -Cardiology consulted  -Continue PTA medications Coreg    CKD stage IV  -Consult nephrology    Transaminitis  -Elevated bili, alkalinephos, AST    Right-sided pleural effusion  -CTA of abdomen and pelvis negative for gastrointestinal hemorrhage does reveal large right-sided pleural effusion containing loculations of air there is atelectasis of the right middle and right lower lobes, small amount of free fluid in the pelvis. -Breathing comfortably on room air  -Consult pulmonary for recommendations    Anal pain  Rectal bleeding  -Hard stool noted in rectal vault, relieved with enema and manual disimpaction    Dehydration  BMP sodium and chloride low  Gentle fluid hydration    DVT Prophylaxis: SCDs  Code Status: Full code  POA/NOK:    This care involved high complexity medical decision making: I personally:  Reviewed the flowsheet and previous days notes  Reviewed and summarized records or history from previous days note or discussions with staff, family  High Risk Drug therapy requiring intensive monitoring for toxicity: eg steroids, pressors, antibiotics  Reviewed and/or ordered Clinical lab tests  Reviewed images and/or ordered Radiology tests  Reviewed the patients ECG / Telemetry  discussed my assessment/management with : Nursing, Hospitalist and Family for coordination of care   Total Time spent in direct and indirect care including assessment review of labs and coordination of services and consultations: Greater than 75 minutes    This is dictation was done by dragon, computer voice recognition software. Quite often unanticipated grammatical, syntax, homophones and other interpretive errors or inadvertently transcribed by the computer software. Please excuse errors that have escaped final proofreading. Thank you.      Signed By: Aron Trinidad NP     February 24, 2023

## 2023-02-24 NOTE — PROGRESS NOTES
Admission Medication Reconciliation:    Information obtained from:  Patient    Comments/Recommendations: Reviewed PTA medications and patient's allergies. Removed aspirin 81 mg  Removed auryxia 210 mg  Removed bethanechol 50 mg  Removed calcitriol 0.25 mcg  Removed cinacalcet 30 mg  Removed ferrous sulfate 325 mg  Removed furosemide 80 mg  Removed hctz 12.5 mg  Removed metoprolol tart 25 mg  Removed sodium bicarb 650 mg    Pharmacy: [facility]      Allergies:  Patient has no known allergies. Significant PMH/Disease States:   Past Medical History:   Diagnosis Date    Heart failure ([facility] [address]     Hypertension     Kidney disease      Chief Complaint for this Admission:    Chief Complaint   Patient presents with    Anal Pain     Prior to Admission Medications:   Prior to Admission Medications   Prescriptions Last Dose Informant Patient Reported? Taking?   carvediloL (COREG) 12.5 mg tablet   Yes Yes   Sig: Take 12.5 mg by mouth two (2) times a day. sevelamer carbonate (RENVELA) 800 mg tab tab   Yes Yes   Sig: Take 1,600 mg by mouth three (3) times daily.       Facility-Administered Medications: None       Mere Waller

## 2023-02-24 NOTE — Clinical Note
Status[de-identified] INPATIENT [101]   Type of Bed: Medical [8]   Cardiac Monitoring Required?: Yes   Inpatient Hospitalization Certified Necessary for the Following Reasons: 3. Patient receiving treatment that can only be provided in an inpatient setting (further clarification in H&P documentation)   Admitting Diagnosis: Dehydration [276.51. ICD-9-CM]   Admitting Diagnosis: Pleural effusion [069864]   Admitting Diagnosis: Rectal bleeding [870870]   Admitting Diagnosis: Hard stool [8934988]   Admitting Physician: Cordell Bartlett [[id number]]   Attending Physician: Cordell Bartlett [[id number]]   Estimated Length of Stay: 3-4 Midnights   Discharge Plan[de-identified] Home with Office Follow-up

## 2023-02-24 NOTE — ED PROVIDER NOTES
EMERGENCY DEPARTMENT HISTORY AND PHYSICAL EXAM      Date: 2/24/2023  Patient Name: Daphne Mosher    History of Presenting Illness     Chief Complaint   Patient presents with    Anal Pain       History Provided By: Patient    HPI: Daphne Mosher, 70 y.o. female with a past medical history significant renal insufficiency presents to the ED with chief complaint of Anal Pain  . RECTAL PAIN WITH \"TURD STUCK\"   AND BRBPR            There are no other complaints, changes, or physical findings at this time. PCP: Sheila Silva MD    Current Outpatient Medications   Medication Sig Dispense Refill    Auryxia 210 mg iron tablet       calcitRIOL (ROCALTROL) 0.25 mcg capsule       cinacalcet (SENSIPAR) 30 mg tablet       vitamin a & d (A&D) ointment Apply  to affected area as needed for Skin Irritation. metoprolol tartrate (LOPRESSOR) 25 mg tablet Take  by mouth two (2) times a day. cephALEXin (KEFLEX) 500 mg capsule Take 1 Capsule by mouth three (3) times daily. (Patient not taking: Reported on 1/13/2022) 15 Capsule 0    bethanechol chloride (URECHOLINE) 50 mg tablet       FeroSuL 325 mg (65 mg iron) tablet Take 325 mg by mouth two (2) times a day. furosemide (LASIX) 40 mg tablet Take 40 mg by mouth daily. sodium bicarbonate 650 mg tablet TAKE TWO TABLETS BY MOUTH THREE TIMES A DAY AS DIRECTED      hydroCHLOROthiazide (MICROZIDE) 12.5 mg capsule Take 12.5 mg by mouth daily. aspirin delayed-release 81 mg tablet Take  by mouth daily. Past History     Past Medical History:  Past Medical History:   Diagnosis Date    Heart failure ([address]     Hypertension     Kidney disease        Past Surgical History:  Past Surgical History:   Procedure Laterality Date    HX KNEE REPLACEMENT Right     HX UROLOGICAL  09/23/2021    cystospoy       Family History:  No family history on file.     Social History:  Social History     Tobacco Use    Smoking status: Former     Years: 10.00     Types: Cigarettes    Smokeless tobacco: Never   Vaping Use    Vaping Use: Never used   Substance Use Topics    Alcohol use: Not Currently    Drug use: Never       Allergies:  No Known Allergies      Review of Systems   Review of Systems   Constitutional: Negative. Negative for chills, fatigue and fever. HENT: Negative. Negative for congestion, nosebleeds and sore throat. Eyes: Negative. Negative for pain, discharge and visual disturbance. Respiratory:  Positive for shortness of breath. Negative for cough and chest tightness. Cardiovascular:  Negative for chest pain, palpitations and leg swelling. Gastrointestinal:  Positive for anal bleeding and rectal pain. Negative for abdominal pain, blood in stool, constipation, diarrhea, nausea and vomiting. Endocrine: Negative. Genitourinary: Negative. Negative for difficulty urinating, dysuria, pelvic pain and vaginal bleeding. Musculoskeletal: Negative. Negative for arthralgias, back pain and myalgias. Skin: Negative. Negative for rash and wound. Allergic/Immunologic: Negative. Neurological: Negative. Negative for dizziness, syncope, weakness, numbness and headaches. Hematological: Negative. Psychiatric/Behavioral: Negative. Negative for agitation, confusion and suicidal ideas. All other systems reviewed and are negative. Positives and Pertinent negatives as per HPI. Physical Exam   Patient Vitals for the past 24 hrs:   Temp Pulse Resp BP SpO2   02/24/23 0912 -- 97 -- 104/63 97 %   02/24/23 0743 -- -- 18 -- --   02/24/23 0736 98.1 °F (36.7 °C) 97 -- 95/61 98 %         Physical Exam  Vitals and nursing note reviewed. Exam conducted with a chaperone present. Constitutional:       Appearance: Normal appearance. She is normal weight. HENT:      Head: Normocephalic and atraumatic. Nose: Nose normal.      Mouth/Throat:      Mouth: Mucous membranes are moist.      Pharynx: Oropharynx is clear.    Eyes:      Extraocular Movements: Extraocular movements intact. Conjunctiva/sclera: Conjunctivae normal.      Pupils: Pupils are equal, round, and reactive to light. Cardiovascular:      Rate and Rhythm: Normal rate and regular rhythm. Pulses: Normal pulses. Heart sounds: Normal heart sounds. Pulmonary:      Effort: Respiratory distress present. Breath sounds: Normal breath sounds. Abdominal:      General: Abdomen is flat. Bowel sounds are normal. There is no distension. Palpations: Abdomen is soft. Tenderness: There is no abdominal tenderness. There is no guarding. Comments: ECAL IMPACTION   Musculoskeletal:         General: No swelling, tenderness, deformity or signs of injury. Normal range of motion. Cervical back: Normal range of motion and neck supple. Right lower leg: No edema. Left lower leg: No edema. Skin:     General: Skin is warm and dry. Capillary Refill: Capillary refill takes less than 2 seconds. Findings: No lesion or rash. Neurological:      General: No focal deficit present. Mental Status: She is alert and oriented to person, place, and time. Mental status is at baseline. Cranial Nerves: No cranial nerve deficit. Psychiatric:         Mood and Affect: Mood normal.         Behavior: Behavior normal.         Thought Content:  Thought content normal.         Judgment: Judgment normal.       Diagnostic Study Results     LABS:   Recent Results (from the past 12 hour(s))   CBC WITH AUTOMATED DIFF    Collection Time: 02/24/23  7:46 AM   Result Value Ref Range    WBC 10.5 3.6 - 11.0 K/uL    RBC 2.91 (L) 3.80 - 5.20 M/uL    HGB 7.7 (L) 11.5 - 16.0 g/dL    HCT 23.4 (L) 35.0 - 47.0 %    MCV 80.4 80.0 - 99.0 FL    MCH 26.5 26.0 - 34.0 PG    MCHC 32.9 30.0 - 36.5 g/dL    RDW 16.8 (H) 11.5 - 14.5 %    PLATELET 718 202 - 103 K/uL    MPV 11.4 8.9 - 12.9 FL    NRBC 0.0 0.0  WBC    ABSOLUTE NRBC 0.00 0.00 - 0.01 K/uL    NEUTROPHILS 82 (H) 32 - 75 %    LYMPHOCYTES 10 (L) 12 - 49 %    MONOCYTES 7 5 - 13 %    EOSINOPHILS 0 0 - 7 %    BASOPHILS 0 0 - 1 %    IMMATURE GRANULOCYTES 1 (H) 0 - 0.5 %    ABS. NEUTROPHILS 8.6 (H) 1.8 - 8.0 K/UL    ABS. LYMPHOCYTES 1.0 0.8 - 3.5 K/UL    ABS. MONOCYTES 0.7 0.0 - 1.0 K/UL    ABS. EOSINOPHILS 0.0 0.0 - 0.4 K/UL    ABS. BASOPHILS 0.0 0.0 - 0.1 K/UL    ABS. IMM. GRANS. 0.1 (H) 0.00 - 0.04 K/UL    DF AUTOMATED     METABOLIC PANEL, COMPREHENSIVE    Collection Time: 02/24/23  7:46 AM   Result Value Ref Range    Sodium 133 (L) 136 - 145 mmol/L    Potassium 4.9 3.5 - 5.1 mmol/L    Chloride 94 (L) 97 - 108 mmol/L    CO2 35 (H) 21 - 32 mmol/L    Anion gap 4 (L) 5 - 15 mmol/L    Glucose 100 65 - 100 mg/dL    BUN 36 (H) 6 - 20 mg/dL    Creatinine 2.90 (H) 0.55 - 1.02 mg/dL    BUN/Creatinine ratio 12 12 - 20      eGFR 17 (L) >60 ml/min/1.73m2    Calcium 8.5 8.5 - 10.1 mg/dL    Bilirubin, total 1.1 (H) 0.2 - 1.0 mg/dL    AST (SGOT) 51 (H) 15 - 37 U/L    ALT (SGPT) 22 12 - 78 U/L    Alk.  phosphatase 199 (H) 45 - 117 U/L    Protein, total 6.7 6.4 - 8.2 g/dL    Albumin 2.1 (L) 3.5 - 5.0 g/dL    Globulin 4.6 (H) 2.0 - 4.0 g/dL    A-G Ratio 0.5 (L) 1.1 - 2.2     TROPONIN-HIGH SENSITIVITY    Collection Time: 02/24/23  7:46 AM   Result Value Ref Range    Troponin-High Sensitivity 84 (H) 0 - 51 ng/L   NT-PRO BNP    Collection Time: 02/24/23  7:46 AM   Result Value Ref Range    NT pro-BNP >35,000 (H) <125 pg/mL   PTT    Collection Time: 02/24/23  7:46 AM   Result Value Ref Range    aPTT 29.9 21.2 - 34.1 sec    aPTT, therapeutic range   82 - 109 sec   PROTHROMBIN TIME + INR    Collection Time: 02/24/23  7:46 AM   Result Value Ref Range    Prothrombin time 15.0 (H) 11.9 - 14.6 sec    INR 1.2 (H) 0.9 - 1.1     TYPE & SCREEN    Collection Time: 02/24/23  7:46 AM   Result Value Ref Range    Crossmatch Expiration 02/27/2023,2359     ABO/Rh(D) Sheryl Kwaku Positive     Antibody screen Negative    EKG, 12 LEAD, INITIAL    Collection Time: 02/24/23  7:57 AM   Result Value Ref Range Ventricular Rate 96 BPM    Atrial Rate 96 BPM    P-R Interval 182 ms    QRS Duration 96 ms    Q-T Interval 378 ms    QTC Calculation (Bezet) 477 ms    Calculated P Axis 49 degrees    Calculated R Axis -4 degrees    Calculated T Axis -10 degrees    Diagnosis       Sinus rhythm with occasional Premature ventricular complexes  Anterior infarct , age undetermined  Abnormal ECG  No previous ECGs available  Confirmed by Viki Menendez ([id number]) on 2/24/2023 9:42:35 AM          EKG: EKG interpreted independently by ER physician. RADIOLOGY:  Non-plain film images such as CT, Ultrasound and MRI are read by the radiologist. Plain radiographic images are visualized and preliminarily interpreted by the ED Provider with the below findings:          Interpretation per the Radiologist below, if available at the time of this note:     CT ABD PELV W WO CONT    Result Date: 2/24/2023  EXAM: CT ABD PELV W WO CONT INDICATION: brbpr ESRD on HD COMPARISON: CT of the abdomen/pelvis dated 9 September 2021. IV CONTRAST: 100 mL of Isovue-370. ORAL CONTRAST: None TECHNIQUE:  Multislice helical CT was performed from the diaphragm to the pubic symphysis prior to intravenous contrast administration and from the diaphragm to the symphysis pubis during uneventful rapid bolus intravenous contrast administration. CT acquisitions were performed in the unenhanced, arterial, portal venous, and delayed phases. Contiguous 2.5 mm axial images were reconstructed and lung and soft tissue windows were generated. Coronal and sagittal reformations were generated. CT dose reduction was achieved through use of a standardized protocol tailored for this examination and automatic exposure control for dose modulation. FINDINGS: LOWER THORAX: There is a moderate to large volume right-sided pleural effusion with associated atelectasis of the RIGHT middle and RIGHT lower lobes. There are air loculations within the effusion above the diaphragm.  The heart is moderately enlarged. A central venous catheter tip is seen at the RIGHT atrium. The RIGHT hemidiaphragm is elevated. LIVER: The liver measures 19 cm longitudinal. No abnormally enhancing liver mass is seen. BILIARY TREE: Gallbladder is within normal limits. CBD is not dilated. No calcified gallstone. SPLEEN: within normal limits. PANCREAS: No mass or ductal dilatation. ADRENALS: Unremarkable. KIDNEYS: No mass, calculus, or hydronephrosis. There is a 2 cm nonenhancing simple cyst at the midpole of the RIGHT kidney. STOMACH: Unremarkable. SMALL BOWEL: No dilatation or wall thickening. No CT evidence of active hemorrhage. COLON: No dilatation or wall thickening. No CT evidence of active hemorrhage. There are numerous colonic diverticula, but no evidence of acute diverticulitis. APPENDIX: Not clearly visualized. There is a small amount of free fluid within the RIGHT paracolic gutter. PERITONEUM: There is a small volume of ascites fluid. No pneumoperitoneum. RETROPERITONEUM: No lymphadenopathy or aortic aneurysm. The abdominal aorta is normal in caliber. The celiac, superior mesenteric, and inferior mesenteric arteries are patent. Bilateral common iliac and external iliac arteries are patent. REPRODUCTIVE ORGANS: No adnexal mass. URINARY BLADDER: No mass or calculus. BONES: No destructive bone lesion. ABDOMINAL WALL: No mass or hernia. There is diffuse superficial soft tissue edema. ADDITIONAL COMMENTS: N/A     1. No CT evidence of active gastrointestinal hemorrhage. 2. Large right-sided pleural effusion containing loculations of air. There is atelectasis of the RIGHT middle and RIGHT lower lobes. 3. Small amount of free fluid in the pelvis. 4. Colonic diverticulosis. No evidence of acute diverticulitis. XR CHEST PORT    Result Date: 2/24/2023  INDICATION:  cp EXAM: Single portable view of chest 1040 . Comparison:None Findings: Cardiac silhouette is enlarged. Pulmonary vasculature is prominent.  Small to moderate right pleural effusion. Tunneled right sided central venous catheter. Right hemidiaphragm is elevated. Minimal interstitial prominence. 1. Enlarged cardiac silhouette, mild interstitial edema 2. Elevation of the right hemidiaphragm with associated right pleural effusion      CT Results  (Last 48 hours)                 02/24/23 1143  CT ABD PELV W WO CONT Final result    Impression:      1. No CT evidence of active gastrointestinal hemorrhage. 2. Large right-sided pleural effusion containing loculations of air. There is   atelectasis of the RIGHT middle and RIGHT lower lobes. 3. Small amount of free fluid in the pelvis. 4. Colonic diverticulosis. No evidence of acute diverticulitis. Narrative:  EXAM: CT ABD PELV W WO CONT       INDICATION: brbpr ESRD on HD       COMPARISON: CT of the abdomen/pelvis dated 9 September 2021. IV CONTRAST: 100 mL of Isovue-370. ORAL CONTRAST: None       TECHNIQUE:     Multislice helical CT was performed from the diaphragm to the pubic symphysis   prior to intravenous contrast administration and from the diaphragm to the   symphysis pubis during uneventful rapid bolus intravenous contrast   administration. CT acquisitions were performed in the unenhanced, arterial,   portal venous, and delayed phases. Contiguous 2.5 mm axial images were   reconstructed and lung and soft tissue windows were generated. Coronal and   sagittal reformations were generated. CT dose reduction was achieved through   use of a standardized protocol tailored for this examination and automatic   exposure control for dose modulation. FINDINGS:    LOWER THORAX: There is a moderate to large volume right-sided pleural effusion   with associated atelectasis of the RIGHT middle and RIGHT lower lobes. There are   air loculations within the effusion above the diaphragm. The heart is moderately   enlarged. A central venous catheter tip is seen at the RIGHT atrium.  The RIGHT hemidiaphragm is elevated. LIVER: The liver measures 19 cm longitudinal. No abnormally enhancing liver mass   is seen. BILIARY TREE: Gallbladder is within normal limits. CBD is not dilated. No   calcified gallstone. SPLEEN: within normal limits. PANCREAS: No mass or ductal dilatation. ADRENALS: Unremarkable. KIDNEYS: No mass, calculus, or hydronephrosis. There is a 2 cm nonenhancing   simple cyst at the midpole of the RIGHT kidney. STOMACH: Unremarkable. SMALL BOWEL: No dilatation or wall thickening. No CT evidence of active   hemorrhage. COLON: No dilatation or wall thickening. No CT evidence of active hemorrhage. There are numerous colonic diverticula, but no evidence of acute diverticulitis. APPENDIX: Not clearly visualized. There is a small amount of free fluid within   the RIGHT paracolic gutter. PERITONEUM: There is a small volume of ascites fluid. No pneumoperitoneum. RETROPERITONEUM: No lymphadenopathy or aortic aneurysm. The abdominal aorta is   normal in caliber. The celiac, superior mesenteric, and inferior mesenteric   arteries are patent. Bilateral common iliac and external iliac arteries are   patent. REPRODUCTIVE ORGANS: No adnexal mass. URINARY BLADDER: No mass or calculus. BONES: No destructive bone lesion. ABDOMINAL WALL: No mass or hernia. There is diffuse superficial soft tissue   edema. ADDITIONAL COMMENTS: N/A                 CXR Results  (Last 48 hours)                 02/24/23 1049  XR CHEST PORT Final result    Impression:  1. Enlarged cardiac silhouette, mild interstitial edema   2. Elevation of the right hemidiaphragm with associated right pleural effusion           Narrative:  INDICATION:  cp        EXAM: Single portable view of chest 1040 . Comparison:None       Findings: Cardiac silhouette is enlarged. Pulmonary vasculature is prominent. Small to moderate right pleural effusion. Tunneled right sided central venous   catheter.  Right hemidiaphragm is elevated. Minimal interstitial prominence. Medical Decision Making and ED Course       CC/HPI Summary, DDx, ED Course, and Reassessment: 50-year-old female with multiple complaints including shortness of breath related to fluid overload with end-stage renal disease versus CHF versus pneumonia. With a fecal impaction and bleeding will evaluate for acute GI bleed. Stool removed. And. These orders were placed during the ER evaluation:  Orders Placed This Encounter    XR CHEST PORT     Standing Status:   Standing     Number of Occurrences:   1     Order Specific Question:   Reason for Exam     Answer:   cp    CT ABD PELV W WO CONT     Standing Status:   Standing     Number of Occurrences:   1     Order Specific Question:   Transport     Answer:   BED [2]     Order Specific Question:   Reason for Exam     Answer:   brbpr ESRD on HD     Order Specific Question: This order utilizes IV contrast.  What additional contrast is needed? Answer:   None     Order Specific Question:   Does patient have history of Renal Disease? Answer:   No    CBC WITH AUTOMATED DIFF     Standing Status:   Standing     Number of Occurrences:   1    METABOLIC PANEL, COMPREHENSIVE     Standing Status:   Standing     Number of Occurrences:   1    TROPONIN-HIGH SENSITIVITY     Standing Status:   Standing     Number of Occurrences:   1    BNP (NT-PRO)     Standing Status:   Standing     Number of Occurrences:   1    PTT     Standing Status:   Standing     Number of Occurrences:   1    PROTHROMBIN TIME + INR     Standing Status:   Standing     Number of Occurrences:   1    ENEMA SOAP SUDS     Standing Status:   Standing     Number of Occurrences:   1    EKG 12 LEAD INITIAL     Standing Status:   Standing     Number of Occurrences:   1     Order Specific Question:   Reason for Exam:     Answer:   hypotension    TYPE & SCREEN     ENTER SURGERY DATE IF FOR PRE-OP TESTING.      Standing Status:   Standing Number of Occurrences:   1     Order Specific Question:   Has patient been transfused or pregnant in the last 3 mos. ? Answer:   Unknown    lidocaine (XYLOCAINE) 4 % (40 mg/mL) topical solution    iopamidoL (ISOVUE-370) 370 mg iodine /mL (76 %) injection 100 mL        Patient was given the following medications:  Medications   lidocaine (XYLOCAINE) 4 % (40 mg/mL) topical solution ( Topical Given 2/24/23 0906)   iopamidoL (ISOVUE-370) 370 mg iodine /mL (76 %) injection 100 mL (100 mL IntraVENous Given 2/24/23 1143)           ED Course:       ED Course as of 02/24/23 1337   Fri Feb 24, 2023   0845 NT pro-BNP(!): >35,000 [HP]   0845 Troponin-High Sensitivity(!): 84  Ckd. [HP]   0846 Creatinine(!): 2.90 [HP]   0908 EKG at 757. Normal sinus rhythm rate of 96. No ST changes. Reason rule out dysrhythmia. Interpreted by ER physician. [HP]   1332 Ct: IMPRESSION     1. No CT evidence of active gastrointestinal hemorrhage. 2. Large right-sided pleural effusion containing loculations of air. There is  atelectasis of the RIGHT middle and RIGHT lower lobes. 3. Small amount of free fluid in the pelvis. [HP]      ED Course User Index  [HP] Tay MALONE MD         Vital Signs-Reviewed the patient's vital signs. Patient Vitals for the past 24 hrs:   Temp Pulse Resp BP SpO2   02/24/23 0912 -- 97 -- 104/63 97 %   02/24/23 0743 -- -- 18 -- --   02/24/23 0736 98.1 °F (36.7 °C) 97 -- 95/61 98 %     Vitals interpreted independently by ER physician. STABLE    Medical decision making tools:                No data recorded          Disposition Considerations (Tests not done, Shared Decision Making, Pt Expectation of Test or Tx.): Will admit for dialysis end-stage renal disease with pleural effusion stool impaction has been disimpacted. CONSULTS: (Who and What was discussed)  None    Chronic Conditions: ESRD    Social Determinants affecting Dx or Tx: Patient lacks support at home or lives alone.     Records Reviewed (source and summary of external notes): Prior medical records  Records Reviewed: Previous Hospital chart. EMS run report. I reviewed the vital signs, available nursing notes, past medical history, past surgical history, family history and social history. Initial assessment performed. The patients presenting problems have been discussed, and they are in agreement with the care plan formulated and outlined with them. I have encouraged them to ask questions as they arise throughout their visit. Admitted      Procedures               Disposition       Emergency Department Disposition:  Admitted      Diagnosis     Clinical Impression:   1. Pleural effusion    2. ESRD on dialysis ([facility] [address]    3. Fecal impaction in rectum [facility])        Attestations:    Khadar Kirk MD    Please note that this dictation was completed with [employer], the computer voice recognition software. Quite often unanticipated grammatical, syntax, homophones, and other interpretive errors are inadvertently transcribed by the computer software. Please disregard these errors. Please excuse any errors that have escaped final proofreading. Thank you.

## 2023-02-24 NOTE — ED NOTES
Assumed care of patient. Bedside shift report received from [facility] [employer] Patient resting on stretcher, no acute distress noted. Connected to continuous cardiac and SpO2 monitoring. Currently awaiting glycerin suppository to be available from pharmacy.

## 2023-02-24 NOTE — PROGRESS NOTES
Reason for Admission:  Plural Effusion                      RUR Score: N/A                    Plan for utilizing home health:  Not at this time        PCP: First and Last name:  Natalie Gann MD     Name of Practice:    Are you a current patient: Yes/No:    Approximate date of last visit: Appointment on Feb. 28   Can you participate in a virtual visit with your PCP:                     Current Advanced Directive/Advance Care Plan: Full Code      Healthcare Decision Maker:   Click here to complete [address] including selection of the Healthcare Decision Maker Relationship (ie \"Primary\")     Jose Manuel James, son, [phone]                        Transition of Care Plan:      Met f/f with Pt, she confirmed that the information on the face sheet is correct. Pt stated that she lives alone. No HH, has a walker and independent with ADL. Pt goes to [facility] on T-Th- Sat at 5:00 AM, Pt drivers herself to dialysis. Send RX to [facility] [address]. A friend will transport home. CM dispo: home with no needs.

## 2023-02-24 NOTE — ED TRIAGE NOTES
Patient stated that her rectum was hurting and had rectal bleeding.  She states she \"has a turd in her butt\"

## 2023-02-25 LAB
ANION GAP SERPL CALC-SCNC: 7 MMOL/L (ref 5–15)
ATRIAL RATE: 105 BPM
BUN SERPL-MCNC: 43 MG/DL (ref 6–20)
BUN/CREAT SERPL: 12 (ref 12–20)
CA-I BLD-MCNC: 8.7 MG/DL (ref 8.5–10.1)
CALCULATED P AXIS, ECG09: 39 DEGREES
CALCULATED R AXIS, ECG10: -19 DEGREES
CALCULATED T AXIS, ECG11: -6 DEGREES
CHLORIDE SERPL-SCNC: 96 MMOL/L (ref 97–108)
CO2 SERPL-SCNC: 33 MMOL/L (ref 21–32)
CREAT SERPL-MCNC: 3.58 MG/DL (ref 0.55–1.02)
DIAGNOSIS, 93000: NORMAL
ERYTHROCYTE [DISTWIDTH] IN BLOOD BY AUTOMATED COUNT: 16.8 % (ref 11.5–14.5)
GLUCOSE SERPL-MCNC: 73 MG/DL (ref 65–100)
HCT VFR BLD AUTO: 21.9 % (ref 35–47)
HGB BLD-MCNC: 7.3 G/DL (ref 11.5–16)
MCH RBC QN AUTO: 26.9 PG (ref 26–34)
MCHC RBC AUTO-ENTMCNC: 33.3 G/DL (ref 30–36.5)
MCV RBC AUTO: 80.8 FL (ref 80–99)
NRBC # BLD: 0 K/UL (ref 0–0.01)
NRBC BLD-RTO: 0 PER 100 WBC
P-R INTERVAL, ECG05: 172 MS
PLATELET # BLD AUTO: 255 K/UL (ref 150–400)
PMV BLD AUTO: 11.3 FL (ref 8.9–12.9)
POTASSIUM SERPL-SCNC: 3.9 MMOL/L (ref 3.5–5.1)
Q-T INTERVAL, ECG07: 372 MS
QRS DURATION, ECG06: 86 MS
QTC CALCULATION (BEZET), ECG08: 491 MS
RBC # BLD AUTO: 2.71 M/UL (ref 3.8–5.2)
SODIUM SERPL-SCNC: 136 MMOL/L (ref 136–145)
TROPONIN I SERPL HS-MCNC: 95 NG/L (ref 0–51)
VENTRICULAR RATE, ECG03: 105 BPM
WBC # BLD AUTO: 10.8 K/UL (ref 3.6–11)

## 2023-02-25 PROCEDURE — 74011250636 HC RX REV CODE- 250/636: Performed by: INTERNAL MEDICINE

## 2023-02-25 PROCEDURE — 80048 BASIC METABOLIC PNL TOTAL CA: CPT

## 2023-02-25 PROCEDURE — 36415 COLL VENOUS BLD VENIPUNCTURE: CPT

## 2023-02-25 PROCEDURE — 85027 COMPLETE CBC AUTOMATED: CPT

## 2023-02-25 PROCEDURE — 74011250637 HC RX REV CODE- 250/637: Performed by: NURSE PRACTITIONER

## 2023-02-25 PROCEDURE — 74011250637 HC RX REV CODE- 250/637: Performed by: INTERNAL MEDICINE

## 2023-02-25 PROCEDURE — 87340 HEPATITIS B SURFACE AG IA: CPT

## 2023-02-25 PROCEDURE — 74011250636 HC RX REV CODE- 250/636: Performed by: NURSE PRACTITIONER

## 2023-02-25 PROCEDURE — 65270000029 HC RM PRIVATE

## 2023-02-25 PROCEDURE — 84484 ASSAY OF TROPONIN QUANT: CPT

## 2023-02-25 PROCEDURE — 90935 HEMODIALYSIS ONE EVALUATION: CPT

## 2023-02-25 PROCEDURE — 5A1D70Z PERFORMANCE OF URINARY FILTRATION, INTERMITTENT, LESS THAN 6 HOURS PER DAY: ICD-10-PCS | Performed by: HOSPITALIST

## 2023-02-25 PROCEDURE — 74011250636 HC RX REV CODE- 250/636

## 2023-02-25 PROCEDURE — 87040 BLOOD CULTURE FOR BACTERIA: CPT

## 2023-02-25 PROCEDURE — 74011000250 HC RX REV CODE- 250: Performed by: NURSE PRACTITIONER

## 2023-02-25 RX ORDER — HEPARIN SODIUM 1000 [USP'U]/ML
INJECTION, SOLUTION INTRAVENOUS; SUBCUTANEOUS
Status: COMPLETED
Start: 2023-02-25 | End: 2023-02-25

## 2023-02-25 RX ORDER — HEPARIN SODIUM 1000 [USP'U]/ML
3200 INJECTION, SOLUTION INTRAVENOUS; SUBCUTANEOUS
Status: DISCONTINUED | OUTPATIENT
Start: 2023-02-25 | End: 2023-02-27 | Stop reason: HOSPADM

## 2023-02-25 RX ADMIN — HEPARIN SODIUM 3200 UNITS: 1000 INJECTION, SOLUTION INTRAVENOUS; SUBCUTANEOUS at 17:59

## 2023-02-25 RX ADMIN — HEPARIN SODIUM 3200 UNITS: 1000 INJECTION INTRAVENOUS; SUBCUTANEOUS at 17:59

## 2023-02-25 RX ADMIN — IRON SUCROSE: 20 INJECTION, SOLUTION INTRAVENOUS at 19:00

## 2023-02-25 RX ADMIN — SEVELAMER CARBONATE 1600 MG: 800 TABLET, FILM COATED ORAL at 09:13

## 2023-02-25 RX ADMIN — CARVEDILOL 12.5 MG: 12.5 TABLET, FILM COATED ORAL at 09:14

## 2023-02-25 RX ADMIN — ONDANSETRON 4 MG: 2 INJECTION INTRAMUSCULAR; INTRAVENOUS at 14:23

## 2023-02-25 RX ADMIN — EPOETIN ALFA-EPBX 10000 UNITS: 10000 INJECTION, SOLUTION INTRAVENOUS; SUBCUTANEOUS at 17:59

## 2023-02-25 RX ADMIN — SODIUM CHLORIDE, PRESERVATIVE FREE 10 ML: 5 INJECTION INTRAVENOUS at 20:24

## 2023-02-25 RX ADMIN — POLYETHYLENE GLYCOL 3350 17 G: 17 POWDER, FOR SOLUTION ORAL at 09:13

## 2023-02-25 RX ADMIN — SEVELAMER CARBONATE 1600 MG: 800 TABLET, FILM COATED ORAL at 18:42

## 2023-02-25 RX ADMIN — NEPHROCAP 1 CAPSULE: 1 CAP ORAL at 18:45

## 2023-02-25 RX ADMIN — ONDANSETRON 4 MG: 2 INJECTION INTRAMUSCULAR; INTRAVENOUS at 03:57

## 2023-02-25 RX ADMIN — SODIUM CHLORIDE, PRESERVATIVE FREE 10 ML: 5 INJECTION INTRAVENOUS at 04:00

## 2023-02-25 RX ADMIN — IRON SUCROSE 200 MG: 20 INJECTION, SOLUTION INTRAVENOUS at 18:01

## 2023-02-25 RX ADMIN — SEVELAMER CARBONATE 1600 MG: 800 TABLET, FILM COATED ORAL at 22:41

## 2023-02-25 RX ADMIN — SODIUM CHLORIDE, PRESERVATIVE FREE 10 ML: 5 INJECTION INTRAVENOUS at 14:24

## 2023-02-25 NOTE — CONSULTS
RENAL  Consult appear on list but has not been called yet. She is well-known to our service. Recent new start of dialysis postcardiac catheterization at [facility] several weeks ago. Admitted with rectal pain with fecal impaction. However, noted to have a right pleural effusion. Usual dialysis is Tuesday, Thursday and Saturday. She has very little in the way of interdialytic weight gain. We will plan dialysis tomorrow with ultrafiltration of 1.25 L as tolerated. She does tend to have low blood pressures. Dr. Jovany Swartz will see in a.m.   Dialysis orders written    Jose A Huerta MD

## 2023-02-25 NOTE — CONSULTS
PULMONARY CONSULT  [facility]    Name: Jahaira Chinchilla MRN: [id number]   : 1955 Hospital: [facility]   Date: 2023  Admission date: 2023 Hospital Day: 2       HPI:     Hospital Problems  Date Reviewed: 2022            Codes Class Noted POA    Dehydration ICD-10-CM: E86.0  ICD-9-CM: 276.51  2023 Yes        Rectal bleeding ICD-10-CM: K62.5  ICD-9-CM: 569.3  2023 Yes        Pleural effusion ICD-10-CM: J90  ICD-9-CM: 511.9  2023 Yes        Hard stool ICD-10-CM: R19.5  ICD-9-CM: 787.7  2023 Yes        Non-STEMI (non-ST elevated myocardial infarction) [facility]) ICD-10-CM: I21.4  ICD-9-CM: 410.70  2023 Yes        Transaminitis ICD-10-CM: R74.01  ICD-9-CM: 790.4  2023 Yes        Chronic kidney disease (CKD), stage IV (severe) ([address] ICD-10-CM: N18.4  ICD-9-CM: 585.4  2021 Yes    Overview Addendum 2022 12:38 PM by Blanca Poe NP     21 creatinine was 5.51    21: obstructive uropathy (?); she is able to void half of her bladder capacity; advised on timed voiding. 9/3/21 creatinine was 4.03    10/4/21 creatinine was 4.67    21 creatinine was 4.08                       [x] High complexity decision making was performed  [x] See my orders for details      Subjective/Initial History:     I was asked by Brianda Landis MD to see Jahaira Chinchilla  a 70 y.o.  female in consultation     Excerpts from admission 2023 or consult notes as follows:   69-year-old lady came in because of generalized weakness shortness of breath and she was having rectal pain and constipation and starting having bleeding because of the strain.   She was recently started on dialysis at [facility] after the cardiac catheterization, she came in because of fecal impaction rectal pain chest x-ray and CAT scan of the abdomen was done which shows large right pleural effusion possible loculated and some atelectasis of right middle and right lower lobe she is on room air history of smoking in the past no history of hemoptysis so admitted and pulmonary consult was called      No Known Allergies     MAR reviewed and pertinent medications noted or modified as needed     Current Facility-Administered Medications   Medication    epoetin erich-epbx (RETACRIT) injection 10,000 Units    iron sucrose (VENOFER) injection 200 mg    sodium chloride (NS) flush 5-40 mL    sodium chloride (NS) flush 5-40 mL    acetaminophen (TYLENOL) tablet 650 mg    Or    acetaminophen (TYLENOL) suppository 650 mg    bisacodyL (DULCOLAX) tablet 5 mg    ondansetron (ZOFRAN ODT) tablet 4 mg    Or    ondansetron (ZOFRAN) injection 4 mg    polyethylene glycol (MIRALAX) packet 17 g    carvediloL (COREG) tablet 12.5 mg    sevelamer carbonate (RENVELA) tab 1,600 mg      Patient PCP: Renu Clayton MD  PMH:  has a past medical history of Heart failure ([address] Hypertension, and Kidney disease. PSH:   has a past surgical history that includes hx knee replacement (Right) and hx urological (2021). FHX: family history includes Hypertension in her mother. SHX:  reports that she has quit smoking. She has never used smokeless tobacco. She reports that she does not currently use alcohol. She reports that she does not use drugs. ROS:    Review of Systems   Constitutional:  Positive for malaise/fatigue. HENT: Negative. Eyes: Negative. Respiratory: Negative. Cardiovascular: Negative. Gastrointestinal:  Positive for abdominal pain and blood in stool. Genitourinary: Negative. Musculoskeletal: Negative. Skin: Negative. Neurological: Negative.        Objective:     Vital Signs: Telemetry:    normal sinus rhythm Intake/Output:   Visit Vitals  /66   Pulse 87   Temp 97.6 °F (36.4 °C)   Resp 16   Ht 4' 11\" (1.499 m)   Wt 62.3 kg (137 lb 5.6 oz)   SpO2 97%   BMI 27.74 kg/m²       Temp (24hrs), Av.8 °F (36.6 °C), Min:97.5 °F (36.4 °C), Max:98.1 °F (36.7 °C)        O2 Device: None (Room air)         Wt Readings from Last 4 Encounters:   02/25/23 62.3 kg (137 lb 5.6 oz)   01/13/22 61.2 kg (135 lb)   09/23/21 63.5 kg (140 lb)   08/31/21 65.3 kg (144 lb)        No intake or output data in the 24 hours ending 02/25/23 1032    Last shift:      No intake/output data recorded. Last 3 shifts: No intake/output data recorded. Physical Exam:     Physical Exam  Constitutional:       Appearance: Normal appearance. HENT:      Head: Normocephalic and atraumatic. Nose: Nose normal.      Mouth/Throat:      Mouth: Mucous membranes are moist.   Eyes:      Pupils: Pupils are equal, round, and reactive to light. Cardiovascular:      Rate and Rhythm: Normal rate. Pulses: Normal pulses. Pulmonary:      Effort: Pulmonary effort is normal.      Comments: Diminished breath sound on the right side with dullness on percussion  Abdominal:      General: Abdomen is flat. Musculoskeletal:         General: Normal range of motion. Cervical back: Normal range of motion. Skin:     General: Skin is warm. Neurological:      Mental Status: She is alert. Labs:    Recent Labs     02/25/23  0847 02/24/23  0746   WBC 10.8 10.5   HGB 7.3* 7.7*    261   INR  --  1.2*   APTT  --  29.9     Recent Labs     02/25/23  0847 02/24/23  0746    133*   K 3.9 4.9   CL 96* 94*   CO2 33* 35*   GLU 73 100   BUN 43* 36*   CREA 3.58* 2.90*   CA 8.7 8.5   ALB  --  2.1*   ALT  --  22     No results for input(s): PH, PCO2, PO2, HCO3, FIO2 in the last 72 hours. No results for input(s): CPK, CKNDX, TROIQ in the last 72 hours. No lab exists for component: CPKMB  No results found for: BNPP, BNP   No results found for: CULTNo results found for: TSH, TSHEXT    Imaging:    CXR Results  (Last 48 hours)                 02/24/23 1049  XR CHEST PORT Final result    Impression:  1. Enlarged cardiac silhouette, mild interstitial edema   2.  Elevation of the right hemidiaphragm with associated right pleural effusion           Narrative:  INDICATION:  cp        EXAM: Single portable view of chest 1040 . Comparison:None       Findings: Cardiac silhouette is enlarged. Pulmonary vasculature is prominent. Small to moderate right pleural effusion. Tunneled right sided central venous   catheter. Right hemidiaphragm is elevated. Minimal interstitial prominence. Results from [place] encounter on 02/24/23    XR CHEST PORT    Narrative  INDICATION:  cp    EXAM: Single portable view of chest 1040 . Comparison:None    Findings: Cardiac silhouette is enlarged. Pulmonary vasculature is prominent. Small to moderate right pleural effusion. Tunneled right sided central venous  catheter. Right hemidiaphragm is elevated. Minimal interstitial prominence. Impression  1. Enlarged cardiac silhouette, mild interstitial edema  2. Elevation of the right hemidiaphragm with associated right pleural effusion    Results from Hospital Encounter encounter on 02/24/23    CT ABD PELV W WO CONT    Narrative  EXAM: CT ABD PELV W WO CONT    INDICATION: brbpr ESRD on HD    COMPARISON: CT of the abdomen/pelvis dated 9 September 2021. IV CONTRAST: 100 mL of Isovue-370. ORAL CONTRAST: None    TECHNIQUE:  Multislice helical CT was performed from the diaphragm to the pubic symphysis  prior to intravenous contrast administration and from the diaphragm to the  symphysis pubis during uneventful rapid bolus intravenous contrast  administration. CT acquisitions were performed in the unenhanced, arterial,  portal venous, and delayed phases. Contiguous 2.5 mm axial images were  reconstructed and lung and soft tissue windows were generated. Coronal and  sagittal reformations were generated. CT dose reduction was achieved through  use of a standardized protocol tailored for this examination and automatic  exposure control for dose modulation.     FINDINGS:  LOWER THORAX: There is a moderate to large volume right-sided pleural effusion  with associated atelectasis of the RIGHT middle and RIGHT lower lobes. There are  air loculations within the effusion above the diaphragm. The heart is moderately  enlarged. A central venous catheter tip is seen at the RIGHT atrium. The RIGHT  hemidiaphragm is elevated. LIVER: The liver measures 19 cm longitudinal. No abnormally enhancing liver mass  is seen. BILIARY TREE: Gallbladder is within normal limits. CBD is not dilated. No  calcified gallstone. SPLEEN: within normal limits. PANCREAS: No mass or ductal dilatation. ADRENALS: Unremarkable. KIDNEYS: No mass, calculus, or hydronephrosis. There is a 2 cm nonenhancing  simple cyst at the midpole of the RIGHT kidney. STOMACH: Unremarkable. SMALL BOWEL: No dilatation or wall thickening. No CT evidence of active  hemorrhage. COLON: No dilatation or wall thickening. No CT evidence of active hemorrhage. There are numerous colonic diverticula, but no evidence of acute diverticulitis. APPENDIX: Not clearly visualized. There is a small amount of free fluid within  the RIGHT paracolic gutter. PERITONEUM: There is a small volume of ascites fluid. No pneumoperitoneum. RETROPERITONEUM: No lymphadenopathy or aortic aneurysm. The abdominal aorta is  normal in caliber. The celiac, superior mesenteric, and inferior mesenteric  arteries are patent. Bilateral common iliac and external iliac arteries are  patent. REPRODUCTIVE ORGANS: No adnexal mass. URINARY BLADDER: No mass or calculus. BONES: No destructive bone lesion. ABDOMINAL WALL: No mass or hernia. There is diffuse superficial soft tissue  edema. ADDITIONAL COMMENTS: N/A    Impression  1. No CT evidence of active gastrointestinal hemorrhage. 2. Large right-sided pleural effusion containing loculations of air. There is  atelectasis of the RIGHT middle and RIGHT lower lobes. 3. Small amount of free fluid in the pelvis. 4. Colonic diverticulosis.  No evidence of acute diverticulitis. IMPRESSION:   Large right pleural effusion with loculation  Atelectasis right middle and right lower lobe  Chronic kidney disease stage IV  End-stage renal disease on dialysis  Fluid overload secondary to above  Anemia secondary most likely GI bleed  Pt is requiring Drug therapy requiring intensive monitoring for toxicity  Pt is unstable, unpredictable needing inpatient monitoring; is acutely ill and at high risk of sudden decline and decompensation with severe consequenses and continued end organ dysfunction and failure  Prognosis guarded       RECOMMENDATIONS/PLAN:     58-year-old lady came in because of rectal pain because of constipation  CAT scan of the abdomen shows large right pleural effusion with some loculation  Get IR for diagnostic and therapeutic thoracentesis after repeating chest x-ray after dialysis on Monday  Continue with the dialysis  Constipation continue with a stool softener follow-up hemoglobin  NP is greater than 35,000 secondary to renal failure which is chronic  Supplemental O2 to keep sats > 93%  Aspiration precautions  Labs to follow electrolytes, renal function and and blood counts  Glucose monitoring and SSI  Bronchial hygiene with respiratory therapy techniques, bronchodilators  DVT, SUP prophylaxis       This care involved high complexity medical decision making: I personally:  Reviewed the flowsheet and previous days notes  Reviewed and summarized records or history from previous days note or discussions with staff, family  High Risk Drug therapy requiring intensive monitoring for toxicity: eg steroids, pressors, antibiotics  Reviewed and/or ordered Clinical lab tests  Reviewed images and/or ordered Radiology tests  Reviewed the patients ECG / Telemetry  Reviewed and/or adjusted NiPPV settings  Called and arranged for Radiologic procedures or interventions  performed or ordered Diagnostic endoscopies with identified risk factors.   discussed my assessment/management with : Nursing, Hospitalist and Family for coordination of care          Paula Del Valle MD

## 2023-02-25 NOTE — DIALYSIS
Pt soila 3 hour hemodialysis well, 1.25 liters net fluid removed. Epogen 86307 units given IV. Venofer 200mg given IV. Report called to [employer]. Lewis Pisano in telemetry notified pt enroute  to her room, box #42.

## 2023-02-25 NOTE — ROUTINE PROCESS
Dual skin assessment done with Paulette Redo. Skin clean, dry and intact. No skin injuries assessed.

## 2023-02-25 NOTE — PROGRESS NOTES
Progress Note    Patient: Abimael Valdez MRN: [id number]  SSN: [id number]    YOB: 1955  Age: 70 y.o. Sex: female      Admit Date: 2/24/2023    LOS: 1 day     Subjective:     Abimael Valdez is a 70 y.o. female who has a PMH significant for CKD stage IV, unspecified heart failure, chronic pain status post intrathecal pump. She comes into the emergency room with complaints of rectal pain, unable to pass a hard stool that is located in her rectal vault. She is having bleeding from her rectum. Significant labs on admission hemoglobin is 7.7, sodium 133, chloride 94, CO2 35, anion gap 4, BUN 36, creatinine 2.9, troponin 84 and BNP greater than 35,000. Bilirubin total is 1.1, AST 51 and alkaline phos 199. Chest x-ray reveals elevated right hemidiaphragm with associated right pleural effusion. CTA of abdomen and pelvis negative for gastrointestinal hemorrhage does reveal large right-sided pleural effusion containing loculations of air there is atelectasis of the right middle and right lower lobes, small amount of free fluid in the pelvis. Will admit the patient for further management of dehydration, transaminitis, fluid overload with pleural effusion and right-sided loculated air, fecal impaction in the rectum and non-STEMI. Ordered suppository and enema with good response along with manual digital disimpaction. Will consult pulmonary, cardiology and nephrology. Review of Systems:  Review of Systems   Constitutional:  Positive for malaise/fatigue. Respiratory:  Negative for cough and shortness of breath. Cardiovascular:  Negative for chest pain. Gastrointestinal:  Positive for blood in stool and constipation. Musculoskeletal:  Positive for myalgias. Neurological:  Positive for weakness. Psychiatric/Behavioral:  The patient is nervous/anxious.             Objective:     Vitals:    02/25/23 0439 02/25/23 0712 02/25/23 0921 02/25/23 1152   BP: 107/73 94/66 106/66 100/64   Pulse: 90 87  85   Resp: 15 16  18   Temp: 97.5 °F (36.4 °C) 97.6 °F (36.4 °C)  97.5 °F (36.4 °C)   SpO2: 99% 97%  96%   Weight:       Height:            Intake and Output:  Current Shift: No intake/output data recorded. Last three shifts: No intake/output data recorded. Physical Exam:  Physical Exam  Constitutional:       Appearance: She is ill-appearing. Cardiovascular:      Rate and Rhythm: Regular rhythm. Tachycardia present. Pulmonary:      Effort: No respiratory distress. Abdominal:      General: There is no distension. Musculoskeletal:      Right lower leg: No edema. Left lower leg: No edema. Skin:     General: Skin is dry. Neurological:      Motor: Weakness present.            Lab/Data Review:  Recent Results (from the past 24 hour(s))   METABOLIC PANEL, BASIC    Collection Time: 02/25/23  8:47 AM   Result Value Ref Range    Sodium 136 136 - 145 mmol/L    Potassium 3.9 3.5 - 5.1 mmol/L    Chloride 96 (L) 97 - 108 mmol/L    CO2 33 (H) 21 - 32 mmol/L    Anion gap 7 5 - 15 mmol/L    Glucose 73 65 - 100 mg/dL    BUN 43 (H) 6 - 20 mg/dL    Creatinine 3.58 (H) 0.55 - 1.02 mg/dL    BUN/Creatinine ratio 12 12 - 20      eGFR 13 (L) >60 ml/min/1.73m2    Calcium 8.7 8.5 - 10.1 mg/dL   CBC W/O DIFF    Collection Time: 02/25/23  8:47 AM   Result Value Ref Range    WBC 10.8 3.6 - 11.0 K/uL    RBC 2.71 (L) 3.80 - 5.20 M/uL    HGB 7.3 (L) 11.5 - 16.0 g/dL    HCT 21.9 (L) 35.0 - 47.0 %    MCV 80.8 80.0 - 99.0 FL    MCH 26.9 26.0 - 34.0 PG    MCHC 33.3 30.0 - 36.5 g/dL    RDW 16.8 (H) 11.5 - 14.5 %    PLATELET 201 501 - 283 K/uL    MPV 11.3 8.9 - 12.9 FL    NRBC 0.0 0.0  WBC    ABSOLUTE NRBC 0.00 0.00 - 0.01 K/uL   TROPONIN-HIGH SENSITIVITY    Collection Time: 02/25/23  8:47 AM   Result Value Ref Range    Troponin-High Sensitivity 95 (H) 0 - 51 ng/L         Assessment and plan:        Non-STEMI  Elevated troponins  -Continue to trend troponins  -Denies chest pain  -EKG nonischemic  -Consult cardiology CHF exacerbation  Elevated BNP may be related to renal l status  -No previous echo to review  -Cardiology consulted  -Continue PTA medications Coreg     CKD stage IV  -Consult nephrology     Transaminitis  -Elevated bili, alkalinephos, AST     Right-sided pleural effusion  -CTA of abdomen and pelvis negative for gastrointestinal hemorrhage does reveal large right-sided pleural effusion containing loculations of air there is atelectasis of the right middle and right lower lobes, small amount of free fluid in the pelvis.   -Breathing comfortably on room air  -Consult pulmonary for recommendations     Anal pain  Rectal bleeding  -Hard stool noted in rectal vault, relieved with enema and manual disimpaction     Dehydration  BMP sodium and chloride low  Gentle fluid hydration    DISPOSITION: IR for thoracenthesis on monday     DVT Prophylaxis: SCDs  Code Status: Full code  POA/NOK:  Signed By: Tong Choe MD     February 25, 2023

## 2023-02-25 NOTE — CONSULTS
NEPHROLOGY CONSULT NOTE     Patient: Alix Mccauley MRN: [id number]  PCP: Olivia Gonzalez MD   :     1955  Age:   70 y.o. Sex:  female      Referring physician: Edda Upton MD    Reason for consultation:     End-stage renal disease on hemodialysis. Ms. Precious Williamson was seen and examined in the dialysis suite at [facility] this afternoon. Admission Date: 2023  7:32 AM  LOS: 1 day     DISCUSSION / PLAN :   Complete dialysis today. Hemodialysis will provide clearance, help to maintain normokalemia and her volume status. Ms. Precious Williamson is on a  dialysis schedule. Her next dialysis session will be scheduled for  unless there are intervening indications. Epogen will be provided for anemia of chronic kidney disease. The target hemoglobin level is 10 to 11 g/dL. Add renal caps-1 orally once daily. Further GI work-up will be deferred to the primary team.    The pulmonary consultant is on board to address the pleural effusion. Medication should be dosed for her GFR. Follow CBC and electrolytes. Active Problems / Assessment AAActive  : Active Problems:    Chronic kidney disease (CKD), stage IV (severe) ([facility] [address] (2021)      Overview: 21 creatinine was 5.51            21: obstructive uropathy (?); she is able to void half of her bladder       capacity; advised on timed voiding. 9/3/21 creatinine was 4.03            10/4/21 creatinine was 4.67            21 creatinine was 4.08            Dehydration (2023)      Rectal bleeding (2023)      Pleural effusion (2023)      Hard stool (2023)      Non-STEMI (non-ST elevated myocardial infarction) ([facility] [address] (2023)      Transaminitis (2023)         Subjective:       \" I had a bowel movement \"    HPI:     Ms. Precious Williamson is a 78-year-old lady who is well-known to our service. She is followed by my partner Dr. Charan Hernandez.   Her comorbidities include end-stage renal disease, heart failure with reduced ejection fraction, neurogenic bladder, hypertension and anemia of chronic kidney disease. She started maintenance hemodialysis earlier this month at [facility]. She is on a Tuesday Thursday Saturday dialysis schedule at the [facility] dialysis clinic. MS Stormy Price reported an episode of lower gastrointestinal bleeding. There was no suggestion of significant abdominal pain. There is no history of chest pain, shortness of breath, hemoptysis, sore throat, dysuria or hematuria. There has been a recent history of weight loss. There is no history of fever or chills. She was evaluated in the ER yesterday. His creatinine was 3.58 with a hemoglobin 7.3. The imaging demonstrated a large right pleural effusion. She was admitted for further evaluation management. Past Medical Hx:   Past Medical History:   Diagnosis Date    Heart failure ([facility] [address]     Hypertension     Kidney disease         Past Surgical Hx:     Past Surgical History:   Procedure Laterality Date    HX KNEE REPLACEMENT Right     HX UROLOGICAL  09/23/2021    cystospoy       Medications:  Prior to Admission medications    Medication Sig Start Date End Date Taking? Authorizing Provider   sevelamer carbonate (RENVELA) 800 mg tab tab Take 1,600 mg by mouth three (3) times daily. Yes Provider, Historical   carvediloL (COREG) 12.5 mg tablet Take 12.5 mg by mouth two (2) times a day. Yes Provider, Historical       No Known Allergies    Social Hx:  reports that she has quit smoking. She has never used smokeless tobacco. She reports that she does not currently use alcohol. She reports that she does not use drugs.      Family History   Problem Relation Age of Onset    Hypertension Mother        Review of Systems:          See HPI         Objective:    Vitals:    Vitals:    02/25/23 1152 02/25/23 1500 02/25/23 1530 02/25/23 1600   BP: 100/64 (!) 105/58 103/63 Pulse: 85 89 86 80   Resp: 18 18     Temp: 97.5 °F (36.4 °C) 98.3 °F (36.8 °C)     SpO2: 96%      Weight:       Height:         I&O's:  No intake/output data recorded. Visit Vitals  /63   Pulse 80   Temp 98.3 °F (36.8 °C)   Resp 18   Ht 4' 11\" (1.499 m)   Wt 62.3 kg (137 lb 5.6 oz)   SpO2 96%   BMI 27.74 kg/m²       Physical Exam:      Stable seen in the dialysis suite this afternoon. Dialysis was in progress during my visit. [facility] dialysis] was in attendance to my visit. General: A chronically ill-appearing lady lying in bed quite comfortably. Head: Normocephalic. Mucous membranes: Moist and anicteric. Chest examination tunneled right IJ HD catheter noted. Cardiovascular: S1, S2, no S3 gallop, pericardial rub. Respiratory: Breath sound vesicular, no wheezes, no rales,. Abdomen: Not distended, bowel sounds are normal.    Lower extremities: SCDs noted. CNS: Alert and answering all questions appropriate. Psych: Appropriate affect.         Laboratory Results:    Lab Results   Component Value Date    BUN 43 (H) 02/25/2023     02/25/2023    K 3.9 02/25/2023    CL 96 (L) 02/25/2023    CO2 33 (H) 02/25/2023       Lab Results   Component Value Date    BUN 43 (H) 02/25/2023    BUN 36 (H) 02/24/2023    BUN 50 (H) 11/01/2021    BUN 41 (H) 10/04/2021    BUN 35 (H) 09/03/2021    K 3.9 02/25/2023    K 4.9 02/24/2023    K 5.0 11/01/2021    K 5.4 (H) 10/04/2021    K 6.3 (H) 09/03/2021       Lab Results   Component Value Date    WBC 10.8 02/25/2023    RBC 2.71 (L) 02/25/2023    HGB 7.3 (L) 02/25/2023    HCT 21.9 (L) 02/25/2023    MCV 80.8 02/25/2023    MCH 26.9 02/25/2023    RDW 16.8 (H) 02/25/2023     02/25/2023       No results found for: PTH, PHOS    Urine dipstick:   Lab Results   Component Value Date/Time    Color Yellow/Straw 08/05/2021 04:15 PM    Appearance Turbid (A) 08/05/2021 04:15 PM    Specific gravity 1.010 08/05/2021 04:15 PM    pH (UA) 8.0 08/05/2021 04:15 PM Protein 100 (A) 2021 04:15 PM    Glucose Negative 2021 04:15 PM    Ketone Negative 2021 04:15 PM    Bilirubin Negative 2021 04:15 PM    Urobilinogen 0.1 2021 04:15 PM    Nitrites Negative 2021 04:15 PM    Leukocyte Esterase Large (A) 2021 04:15 PM    Bacteria Negative 2021 04:15 PM    Bacteria Negative 2021 04:15 PM    WBC >100 (H) 2021 04:15 PM    WBC >100 (H) 2021 04:15 PM    RBC 5-10 2021 04:15 PM    RBC 5-10 2021 04:15 PM       I have [facility] with: Ms Jasmeet Hall reviewed. Thank you for allowing us to participate in the care of this patient. We will follow patient. Please dont hesitate to call with any questions    Ambreen Davis MD  2023    [place]  [facility]  Phone - [phone]   Fax - [phone]  [email or web address]

## 2023-02-26 ENCOUNTER — APPOINTMENT (OUTPATIENT)
Dept: NON INVASIVE DIAGNOSTICS | Age: 68
DRG: 177 | End: 2023-02-26
Attending: INTERNAL MEDICINE
Payer: COMMERCIAL

## 2023-02-26 LAB
ECHO AO ASC DIAM: 2.6 CM
ECHO AO ASCENDING AORTA INDEX: 1.61 CM/M2
ECHO AO ROOT DIAM: 2.2 CM
ECHO AO ROOT INDEX: 1.37 CM/M2
ECHO AR MAX VEL PISA: 2.5 M/S
ECHO AV MEAN GRADIENT: 3 MMHG
ECHO AV MEAN VELOCITY: 0.7 M/S
ECHO AV PEAK GRADIENT: 5 MMHG
ECHO AV PEAK VELOCITY: 1.2 M/S
ECHO AV REGURGITANT PHT: 392 MS
ECHO AV VELOCITY RATIO: 0.58
ECHO AV VTI: 12.9 CM
ECHO EST RA PRESSURE: 15 MMHG
ECHO LA AREA 4C: 20.2 CM2
ECHO LA DIAMETER INDEX: 2.55 CM/M2
ECHO LA DIAMETER: 4.1 CM
ECHO LA MAJOR AXIS: 5.5 CM
ECHO LA TO AORTIC ROOT RATIO: 1.86
ECHO LA VOL 4C: 62 ML (ref 22–52)
ECHO LA VOLUME INDEX A4C: 39 ML/M2 (ref 16–34)
ECHO LV E' LATERAL VELOCITY: 8 CM/S
ECHO LV E' SEPTAL VELOCITY: 5 CM/S
ECHO LV EDV A2C: 99 ML
ECHO LV EDV A4C: 55 ML
ECHO LV EDV INDEX A4C: 34 ML/M2
ECHO LV EDV NDEX A2C: 61 ML/M2
ECHO LV EJECTION FRACTION A2C: 45 %
ECHO LV EJECTION FRACTION A4C: 29 %
ECHO LV EJECTION FRACTION BIPLANE: 38 % (ref 55–100)
ECHO LV ESV A2C: 54 ML
ECHO LV ESV A4C: 39 ML
ECHO LV ESV INDEX A2C: 34 ML/M2
ECHO LV ESV INDEX A4C: 24 ML/M2
ECHO LV FRACTIONAL SHORTENING: 4 % (ref 28–44)
ECHO LV INTERNAL DIMENSION DIASTOLE INDEX: 3.11 CM/M2
ECHO LV INTERNAL DIMENSION DIASTOLIC: 5 CM (ref 3.9–5.3)
ECHO LV INTERNAL DIMENSION SYSTOLIC INDEX: 2.98 CM/M2
ECHO LV INTERNAL DIMENSION SYSTOLIC: 4.8 CM
ECHO LV IVSD: 1 CM (ref 0.6–0.9)
ECHO LV MASS 2D: 207.1 G (ref 67–162)
ECHO LV MASS INDEX 2D: 128.7 G/M2 (ref 43–95)
ECHO LV POSTERIOR WALL DIASTOLIC: 1.2 CM (ref 0.6–0.9)
ECHO LV RELATIVE WALL THICKNESS RATIO: 0.48
ECHO LVOT AV VTI INDEX: 0.78
ECHO LVOT MEAN GRADIENT: 1 MMHG
ECHO LVOT PEAK GRADIENT: 2 MMHG
ECHO LVOT PEAK VELOCITY: 0.7 M/S
ECHO LVOT VTI: 10 CM
ECHO MV A VELOCITY: 0.8 M/S
ECHO MV E VELOCITY: 1.23 M/S
ECHO MV E/A RATIO: 1.54
ECHO MV E/E' LATERAL: 15.38
ECHO MV E/E' RATIO (AVERAGED): 19.99
ECHO MV E/E' SEPTAL: 24.6
ECHO MV EROA CONT EQ: 0 CM2
ECHO MV EROA PISA: 13.3 CM2
ECHO MV REGURGITANT ALIASING (NYQUIST) VELOCITY: 39 CM/S
ECHO MV REGURGITANT PEAK GRADIENT: 85 MMHG
ECHO MV REGURGITANT PEAK VELOCITY: 4.6 M/S
ECHO MV REGURGITANT RADIUS PISA: 0.5 CM
ECHO MV REGURGITANT VOLUME PISA: 1943.39 ML
ECHO MV REGURGITANT VTIA: 146 CM
ECHO PULMONARY ARTERY END DIASTOLIC PRESSURE: 6 MMHG
ECHO PV MAX VELOCITY: 1 M/S
ECHO PV PEAK GRADIENT: 4 MMHG
ECHO PV REGURGITANT MAX VELOCITY: 1.3 M/S
ECHO RA AREA 4C: 24.8 CM2
ECHO RA END SYSTOLIC VOLUME APICAL 4 CHAMBER INDEX BSA: 57 ML/M2
ECHO RA VOLUME BIPLANE METHOD OF DISKS: 92 ML
ECHO RA VOLUME INDEX BP: 57 ML/M2
ECHO RA VOLUME: 92 ML
ECHO RIGHT VENTRICULAR SYSTOLIC PRESSURE (RVSP): 41 MMHG
ECHO RV TAPSE: 0.7 CM (ref 1.7–?)
ECHO TV REGURGITANT MAX VELOCITY: 2.53 M/S
ECHO TV REGURGITANT PEAK GRADIENT: 26 MMHG
HBV SURFACE AG SER QL: <0.1 INDEX
HBV SURFACE AG SER QL: NEGATIVE

## 2023-02-26 PROCEDURE — 74011250637 HC RX REV CODE- 250/637: Performed by: NURSE PRACTITIONER

## 2023-02-26 PROCEDURE — 74011000250 HC RX REV CODE- 250: Performed by: NURSE PRACTITIONER

## 2023-02-26 PROCEDURE — 97116 GAIT TRAINING THERAPY: CPT

## 2023-02-26 PROCEDURE — 65270000029 HC RM PRIVATE

## 2023-02-26 PROCEDURE — 97161 PT EVAL LOW COMPLEX 20 MIN: CPT

## 2023-02-26 PROCEDURE — 74011250637 HC RX REV CODE- 250/637: Performed by: INTERNAL MEDICINE

## 2023-02-26 PROCEDURE — 74011250636 HC RX REV CODE- 250/636: Performed by: NURSE PRACTITIONER

## 2023-02-26 PROCEDURE — 93306 TTE W/DOPPLER COMPLETE: CPT

## 2023-02-26 RX ORDER — VALSARTAN 80 MG/1
40 TABLET ORAL EVERY 12 HOURS
Status: DISCONTINUED | OUTPATIENT
Start: 2023-02-26 | End: 2023-02-27 | Stop reason: HOSPADM

## 2023-02-26 RX ADMIN — SEVELAMER CARBONATE 1600 MG: 800 TABLET, FILM COATED ORAL at 10:03

## 2023-02-26 RX ADMIN — SODIUM CHLORIDE, PRESERVATIVE FREE 10 ML: 5 INJECTION INTRAVENOUS at 06:49

## 2023-02-26 RX ADMIN — NEPHROCAP 1 CAPSULE: 1 CAP ORAL at 10:02

## 2023-02-26 RX ADMIN — SEVELAMER CARBONATE 1600 MG: 800 TABLET, FILM COATED ORAL at 21:30

## 2023-02-26 RX ADMIN — SODIUM CHLORIDE, PRESERVATIVE FREE 10 ML: 5 INJECTION INTRAVENOUS at 21:31

## 2023-02-26 RX ADMIN — CARVEDILOL 12.5 MG: 12.5 TABLET, FILM COATED ORAL at 10:03

## 2023-02-26 RX ADMIN — ONDANSETRON 4 MG: 2 INJECTION INTRAMUSCULAR; INTRAVENOUS at 21:39

## 2023-02-26 RX ADMIN — POLYETHYLENE GLYCOL 3350 17 G: 17 POWDER, FOR SOLUTION ORAL at 10:02

## 2023-02-26 RX ADMIN — CARVEDILOL 12.5 MG: 12.5 TABLET, FILM COATED ORAL at 21:31

## 2023-02-26 NOTE — PROGRESS NOTES
Problem: Mobility Impaired (Adult and Pediatric)  Goal: *Acute Goals and Plan of Care (Insert Text)  Description: I with LE HEP x7 days  Mod I with bed mob x7 days  SBA with all transfers x7 days  Amb 50-75ft with RW and SBAx1 x7 days    Pt stated goal: to go home  Outcome: Not Met    PHYSICAL THERAPY EVALUATION  Patient: Bel Tolbert (67 y.o. female)  Date: 2/26/2023  Primary Diagnosis: Dehydration [E86.0]  Pleural effusion [J90]  Rectal bleeding [K62.5]  Hard stool [R19.5]       Precautions: In place during session:     ASSESSMENT    Pt is a 70 y.o. female admitted to hospital with dehydration/R pleural effusion presents to PT with decreased bed mob, transfers, LE strength, gt, activity tolerance, and overall functional mobility. Pt supine in bed upon PT arrival, agreeable to evaluation. Pt A&O x 4. PLOF listed below. Based on the objective data described below, the patient presents with generalized weakness, impaired functional mobility, impaired amb, impaired balance, and decreased overall functional mobility and activity tolerance. (See below for objective details and assist levels). Overall pt tolerated session good today with overall SBA with bed mob, CGA with transfers. Pt was able to amb approx 25ft in room with RW and CGAx1, no LOB, no SOB noted. Pt will benefit from continued skilled PT to address above deficits and return to PLOF. Current PT DC recommendation Home with Home Health Therapy once medically appropriate.        PLAN :  Recommendations and Planned Interventions: bed mobility training, transfer training, gait training, therapeutic exercises, patient and family training/education, and therapeutic activities      Recommend for staff: Out of bed to chair for meals, Encourage HEP in prep for ADLs/mobility, Amb to bathroom for toileting with gt belt and AD, and Amb in hallway    Frequency/Duration: Patient will be followed by physical therapy:  2-3x/week to address goals. Recommendation for discharge: (in order for the patient to meet his/her long term goals)  Home with [address] [place] [address] [place]:   Patient supine in bed upon PT arrival, agreeable to work with PT    OBJECTIVE DATA SUMMARY:   HISTORY:    Past Medical History:   Diagnosis Date    Heart failure ([address]     Hypertension     Kidney disease      Past Surgical History:   Procedure Laterality Date    HX KNEE REPLACEMENT Right     HX UROLOGICAL  09/23/2021    cystospoy       Home Situation  Home Environment: Apartment  # Steps to Enter: 0  One/Two Story Residence: One story  Living Alone: Yes  Support Systems: Child(dyllan)  Patient Expects to be Discharged to[de-identified] Home with home health  Current DME Used/Available at Home: Walker, rolling    Personal factors and/or comorbidities impacting plan of care:     Home Situation  Home Environment: Apartment  # Steps to Enter: 0  One/Two Story Residence: One story  Living Alone: Yes  Support Systems: Child(dyllan)  Patient Expects to be Discharged to[de-identified] Home with home health  Current DME Used/Available at Home: Walker, rolling    EXAMINATION/PRESENTATION/DECISION MAKING:   Critical Behavior:  Neurologic State: Alert  Orientation Level: Oriented X4          Skin:  intact where exposed    Edema: none noted    Range Of Motion:  AROM: Within functional limits   B LE         Strength:    Strength: Generally decreased, functional  Grossly 4-/5 B LE    Tone & Sensation:        Sensation: Intact         Functional Mobility:  Bed Mobility:     Supine to Sit: Stand-by assistance  Sit to Supine: Stand-by assistance  Scooting: Stand-by assistance    Transfers:  Sit to Stand: Contact guard assistance  Stand to Sit: Contact guard assistance  Stand Pivot Transfers: Contact guard assistance                    Balance:   Sitting: Intact  Standing: Impaired; With support  Standing - Static: Constant support;Good  Standing - Dynamic : Fair;Constant support    Ambulation/Gait Training:  Distance (ft): 25 Feet (ft)  Assistive Device: Walker, rolling  Ambulation - Level of Assistance: Contact guard assistance         Functional Measure:  [facility] [employer] AM-PAC 6 Clicks         Basic Mobility Inpatient Short Form  How much difficulty does the patient currently have. .. Unable A Lot A Little None   1. Turning over in bed (including adjusting bedclothes, sheets and blankets)? [] 1   [] 2   [x] 3   [] 4   2. Sitting down on and standing up from a chair with arms ( e.g., wheelchair, bedside commode, etc.)   [] 1   [] 2   [x] 3   [] 4   3. Moving from lying on back to sitting on the side of the bed? [] 1   [] 2   [x] 3   [] 4          How much help from another person does the patient currently need. .. Total A Lot A Little None   4. Moving to and from a bed to a chair (including a wheelchair)? [] 1   [] 2   [x] 3   [] 4   5. Need to walk in hospital room? [] 1   [] 2   [x] 3   [] 4   6. Climbing 3-5 steps with a railing? [] 1   [] 2   [x] 3   [] 4   © 2007, Trustees of [facility], under license to [email or web address]. All rights reserved     Score:  Initial: 18 Most Recent: X (Date: -- )   Interpretation of Tool:  Represents activities that are increasingly more difficult (i.e. Bed mobility, Transfers, Gait).   Score 24 23 22-20 19-15 14-10 9-7 6   Modifier CH CI CJ CK CL CM CN           Physical Therapy Evaluation Charge Determination   History Examination Presentation Decision-Making   MEDIUM  Complexity : 1-2 comorbidities / personal factors will impact the outcome/ POC  MEDIUM Complexity : 3 Standardized tests and measures addressing body structure, function, activity limitation and / or participation in recreation  LOW Complexity : Stable, uncomplicated  Other Functional Measure AMPA 6 MED      Based on the above components, the patient evaluation is determined to be of the following complexity level: LOW     Pain Rating:  No c/o pain during session    Activity Tolerance:   Good    After treatment patient left in no apparent distress:   Bed locked and in lowest position Supine in bed, Call bell within reach, and Side rails x 3           COMMUNICATION/EDUCATION:       Patient/family agree to work toward stated goals and plan of care.       Thank you for this referral.  Maryjane Aaron   Time Calculation: 20 mins

## 2023-02-26 NOTE — CONSULTS
[facility] CARDIOLOGY  CARDIOLOGY CONSULTATION    REASON FOR CONSULT: Lower extremity edema and elevated NT proBNP and troponin    REQUESTING PROVIDER:  Reynaldo    CHIEF COMPLAINT: Constipation    HISTORY OF PRESENT ILLNESS: Katty Stoddard is a 65-year-old woman with past medical history for advanced kidney failure, reported prior heart failure, and chronic pain syndrome. She presented with severe constipation and rectal pain due to impacted stool. Initial labs revealed markedly elevated creatinine, troponin of 84 and an NT proBNP greater than 35,000. She notes she occasionally gets leg swelling as she has lots of fluid on board. Does not have any known history of cardiac disease. She denies chest discomfort, pressure, or discomfort in the neck or the jaw or arms while ambulating. She think she has been eating and drinking a normal amount    Records from hospital admission course thus far reviewed. PAST MEDICAL HISTORY:  Notes above. Relevant cardiac issues include elevated NT proBNP, and troponin. HOME MEDICATIONS:  Home medications reviewed, no side effects. ALLERGIES:  Allergies reviewed with the patient    FAMILY HISTORY:  Family history reviewed, no premature cardiac disease. SOCIAL HISTORY: No active tobacco or drug use    REVIEW OF SYSTEMS:  Complete review of systems performed, pertinents noted above, all other systems are negative.       PHYSICAL EXAMINATION:    Visit Vitals  BP (!) 94/58 (BP 1 Location: Left upper arm, BP Patient Position: Semi fowlers)   Pulse 65   Temp 98.1 °F (36.7 °C)   Resp 16   Ht 4' 11\" (1.499 m)   Wt 62.3 kg (137 lb 5.6 oz)   SpO2 90%   BMI 27.74 kg/m²     General: Chronically ill woman, in no acute distress  HEENT: Bitemporal wasting, dry mucous membranes, anicteric sclera  Neck: Elevated jugular venous pressure, approximately 10  Respiratory: Clear lungs bilaterally, no respiratory distress  Cardiac: Regular rate and rhythm, no murmurs  Abdomen: Soft nontender    Extremities, mildly edematous, warm  Neuro: Clear fluent speech  Skin: Intact    Electrocardiogram performed earlier reviewed, it shows     Recent labs results and imaging reviewed. Notable findings include NT proBNP and troponin as above      IMPRESSION AND RECOMMENDATIONS:  Troponin elevation  Asymptomatic and without chest discomfort. In the setting of end-stage renal disease this is actually quite low-no indication of ACS at this time. This is due to delayed clearance from her renal disease  -No indication for ACS treatment at this time    Elevated NT proBNP/questionable heart failure history  Visit evidence of mild volume overload, however this be just due to her end-stage renal disease.  -Please obtain TTE  -Volume management as per dialysis team    Hypertension agree with continuation of carvedilol      Thank you for this consult, we will continue to follow along. Please not hesitate to call us with questions          Thank you for involving us in the care of this patient. Please do not hesitate to call if additional questions arise.

## 2023-02-26 NOTE — PROGRESS NOTES
Progress Note    Patient: Alisa Grady MRN: [id number]  SSN: [id number]    YOB: 1955  Age: 70 y.o. Sex: female      Admit Date: 2/24/2023    LOS: 2 days     Subjective:     68F, h/o ESRD on HD (recent), CHF unknwon EF, chronic pain s/p intrathecal pump presented with rectal pain (resolved) but found to have significant pleural effusion. HOSPITAL COURSE:   Significant labs on admission hemoglobin is 7.7, sodium 133, chloride 94, CO2 35, anion gap 4, BUN 36, creatinine 2.9, troponin 84 and BNP greater than 35,000. Bilirubin total is 1.1, AST 51 and alkaline phos 199. Chest x-ray reveals elevated right hemidiaphragm with associated right pleural effusion. CTA of abdomen and pelvis negative for gastrointestinal hemorrhage does reveal large right-sided pleural effusion containing loculations of air there is atelectasis of the right middle and right lower lobes, small amount of free fluid in the pelvis. IR for thoracenthesis on Monday. Seen by cardiology- no intervention. Review of Systems:  Review of Systems   Constitutional:  Positive for malaise/fatigue. Respiratory:  Negative for cough and shortness of breath. Cardiovascular:  Negative for chest pain. Gastrointestinal:  Positive for blood in stool and constipation. Musculoskeletal:  Positive for myalgias. Neurological:  Positive for weakness. Psychiatric/Behavioral:  The patient is nervous/anxious. Objective:     Vitals:    02/25/23 1902 02/25/23 2303 02/26/23 0247 02/26/23 1122   BP: (!) 94/58 (!) 95/53 100/69 94/63   Pulse: 65 87 87 87   Resp: 16 16 16 16   Temp: 98.1 °F (36.7 °C) 98.1 °F (36.7 °C) 99.1 °F (37.3 °C) 97.6 °F (36.4 °C)   SpO2: 90% 96% 93% 98%   Weight:   65.5 kg (144 lb 6.4 oz)    Height:            Intake and Output:  Current Shift: No intake/output data recorded.   Last three shifts: 02/24 1901 - 02/26 0700  In: -   Out: 1250       Physical Exam:  Physical Exam  Constitutional: Appearance: She is ill-appearing. Cardiovascular:      Rate and Rhythm: Regular rhythm. Tachycardia present. Pulmonary:      Effort: No respiratory distress. Abdominal:      General: There is no distension. Musculoskeletal:      Right lower leg: No edema. Left lower leg: No edema. Skin:     General: Skin is dry. Neurological:      Motor: Weakness present. Lab/Data Review:  Recent Results (from the past 24 hour(s))   HEP B SURFACE AG    Collection Time: 02/25/23  3:00 PM   Result Value Ref Range    Hepatitis B surface Ag <0.10 Index    Hep B surface Ag Interp.  Negative Negative     ECHO ADULT COMPLETE    Collection Time: 02/26/23  8:40 AM   Result Value Ref Range    LV EDV A2C 99 mL    LV EDV A4C 55 mL    LV ESV A2C 54 mL    LV ESV A4C 39 mL    IVSd 1.0 0.6 - 0.9 cm    LVIDd 5.0 3.9 - 5.3 cm    LVIDs 4.8 cm    LVOT Mean Gradient 1 mmHg    LVOT VTI 10.0 cm    LVOT Peak Velocity 0.7 m/s    LVOT Peak Gradient 2 mmHg    LVPWd 1.2 0.6 - 0.9 cm    LV E' Lateral Velocity 8 cm/s    LV E' Septal Velocity 5 cm/s    LV Ejection Fraction A2C 45 %    LV Ejection Fraction A4C 29 %    EF BP 38 55 - 100 %    LA Major [place] 5.5 cm    LA Area 4C 20.2 cm2    LA Diameter 4.1 cm    RA Area 4C 24.8 cm2    RA Volume 92 ml    AR .0 ms    AR Max Velocity PISA 2.5 m/s    AV Peak Velocity 1.2 m/s    AV Peak Gradient 5 mmHg    AV Mean Gradient 3 mmHg    AV VTI 12.9 cm    AV Mean Velocity 0.7 m/s    Aortic Root 2.2 cm    Ascending Aorta 2.6 cm    MV Nyquist Velocity 39 cm/s    MR Radius PISA 0.50 cm    MR .0 cm    MR Peak Velocity 4.6 m/s    MR Peak Gradient 85 mmHg    MV A Velocity 0.80 m/s    MV E Velocity 1.23 m/s    MV EROA PISA 13.3 cm2    OK Max Velocity 1.3 m/s    Pulmonary Artery EDP 6 mmHg    PV Max Velocity 1.0 m/s    PV Peak Gradient 4 mmHg    TAPSE 0.7 (A) 1.7 cm    TR Max Velocity 2.53 m/s    TR Peak Gradient 26 mmHg    Fractional Shortening 2D 4 28 - 44 %    LV ESV Index A4C 24 mL/m2    LV EDV Index A4C 34 mL/m2    LV ESV Index A2C 34 mL/m2    LV EDV Index A2C 61 mL/m2    LVIDd Index 3.11 cm/m2    LVIDs Index 2.98 cm/m2    LV RWT Ratio 0.48     LV Mass 2D 207.1 (A) 67 - 162 g    LV Mass 2D Index 128.7 (A) 43 - 95 g/m2    MV E/A 1.54     E/E' Ratio (Averaged) 19.99     E/E' Lateral 15.38     E/E' Septal 24.60     LA Size Index 2.55 cm/m2    LA/AO Root Ratio 1.86     RA Volume Index A4C 57 mL/m2    Ao Root Index 1.37 cm/m2    Ascending Aorta Index 1.61 cm/m2    AV Velocity Ratio 0.58     LVOT:AV VTI Index 0.78     MR Regurg Volume PISA 1,943.39 mL    LA Volume 4C 62 (A) 22 - 52 mL    LA Volume Index 4C 39 (A) 16 - 34 mL/m2    RA Volume BP 92 mL    RA Volume Index BP 57 mL/m2    Est. RA Pressure 15 mmHg    MV EROA Cont Eq 0.0 cm2    RVSP 41 mmHg         Assessment and plan:      (1) increased troponin s/t ESRD and MI typeII,.    (2) CHF unknown EF: pending ECHO    (3) ESRD on HD: on HD    (4) right sided pleural effusion:      (5) Right-sided pleural effusion  -CTA of abdomen and pelvis negative for gastrointestinal hemorrhage does reveal large right-sided pleural effusion containing loculations of air there is atelectasis of the right middle and right lower lobes, small amount of free fluid in the pelvis.  IR for thoracenthesis     (6) Anal pain  Rectal bleeding  -Hard stool noted in rectal vault, relieved with enema and manual disimpaction      DISPOSITION: IR for thoracenthesis on monday     DVT Prophylaxis: SCDs  Code Status: Full code  POA/NOK:  Signed By: Juanjo Muniz MD     February 26, 2023

## 2023-02-26 NOTE — PROGRESS NOTES
PULMONARY NOTE  [facility]    Name: Shanika Arora MRN: [id number]   : 1955 Hospital: [facility]   Date: 2023  Admission date: 2023 Hospital Day: 3       HPI:     Hospital Problems  Date Reviewed: 2022            Codes Class Noted POA    Dehydration ICD-10-CM: E86.0  ICD-9-CM: 276.51  2023 Yes        Rectal bleeding ICD-10-CM: K62.5  ICD-9-CM: 569.3  2023 Yes        Pleural effusion ICD-10-CM: J90  ICD-9-CM: 511.9  2023 Yes        Hard stool ICD-10-CM: R19.5  ICD-9-CM: 787.7  2023 Yes        Non-STEMI (non-ST elevated myocardial infarction) [facility]) ICD-10-CM: I21.4  ICD-9-CM: 410.70  2023 Yes        Transaminitis ICD-10-CM: R74.01  ICD-9-CM: 790.4  2023 Yes        Chronic kidney disease (CKD), stage IV (severe) ([facility] [address] ICD-10-CM: N18.4  ICD-9-CM: 585.4  2021 Yes    Overview Addendum 2022 12:38 PM by Austin Suarez NP     21 creatinine was 5.51    21: obstructive uropathy (?); she is able to void half of her bladder capacity; advised on timed voiding. 9/3/21 creatinine was 4.03    10/4/21 creatinine was 4.67    21 creatinine was 4.08                     [x] High complexity decision making was performed  [x] See my orders for details      Subjective/Initial History:     I was asked by Nani Valencia MD to see Shanika Arora  a 70 y.o.  female in consultation     Excerpts from admission 2023 or consult notes as follows:   56-year-old lady came in because of generalized weakness shortness of breath and she was having rectal pain and constipation and starting having bleeding because of the strain.   She was recently started on dialysis at [facility] after the cardiac catheterization, she came in because of fecal impaction rectal pain chest x-ray and CAT scan of the abdomen was done which shows large right pleural effusion possible loculated and some atelectasis of right middle and right lower lobe she is on room air history of smoking in the past no history of hemoptysis so admitted and pulmonary consult was called      No Known Allergies     MAR reviewed and pertinent medications noted or modified as needed     Current Facility-Administered Medications   Medication    epoetin erich-epbx (RETACRIT) injection 10,000 Units    iron sucrose (VENOFER) injection 200 mg    heparin (porcine) 1,000 unit/mL injection 3,200 Units    B complex-vitaminC-folic acid (NEPHROCAP) cap    sodium chloride (NS) flush 5-40 mL    sodium chloride (NS) flush 5-40 mL    acetaminophen (TYLENOL) tablet 650 mg    Or    acetaminophen (TYLENOL) suppository 650 mg    bisacodyL (DULCOLAX) tablet 5 mg    ondansetron (ZOFRAN ODT) tablet 4 mg    Or    ondansetron (ZOFRAN) injection 4 mg    polyethylene glycol (MIRALAX) packet 17 g    carvediloL (COREG) tablet 12.5 mg    sevelamer carbonate (RENVELA) tab 1,600 mg      Patient PCP: Lula Araujo MD  PMH:  has a past medical history of Heart failure ([address] Hypertension, and Kidney disease. PSH:   has a past surgical history that includes hx knee replacement (Right) and hx urological (09/23/2021). FHX: family history includes Hypertension in her mother. SHX:  reports that she has quit smoking. She has never used smokeless tobacco. She reports that she does not currently use alcohol. She reports that she does not use drugs. ROS:    Review of Systems   Constitutional:  Positive for malaise/fatigue. HENT: Negative. Eyes: Negative. Respiratory: Negative. Cardiovascular: Negative. Gastrointestinal:  Positive for abdominal pain and blood in stool. Genitourinary: Negative. Musculoskeletal: Negative. Skin: Negative. Neurological: Negative.        Objective:     Vital Signs: Telemetry:    normal sinus rhythm Intake/Output:   Visit Vitals  /69 (BP 1 Location: Left upper arm, BP Patient Position: Semi fowlers)   Pulse 87   Temp 99.1 °F (37.3 °C)   Resp 16   Ht 4' 11\" (1.499 m) Wt 65.5 kg (144 lb 6.4 oz)   SpO2 93%   BMI 29.17 kg/m²       Temp (24hrs), Av.2 °F (36.8 °C), Min:97.5 °F (36.4 °C), Max:99.1 °F (37.3 °C)        O2 Device: None (Room air)         Wt Readings from Last 4 Encounters:   23 65.5 kg (144 lb 6.4 oz)   22 61.2 kg (135 lb)   21 63.5 kg (140 lb)   21 65.3 kg (144 lb)          Intake/Output Summary (Last 24 hours) at 2023 0942  Last data filed at 2023 1800  Gross per 24 hour   Intake --   Output 1250 ml   Net -1250 ml       Last shift:      No intake/output data recorded. Last 3 shifts:  1901 -  0700  In: -   Out: 1250        Physical Exam:     Physical Exam  Constitutional:       Appearance: Normal appearance. HENT:      Head: Normocephalic and atraumatic. Nose: Nose normal.      Mouth/Throat:      Mouth: Mucous membranes are moist.   Eyes:      Pupils: Pupils are equal, round, and reactive to light. Cardiovascular:      Rate and Rhythm: Normal rate. Pulses: Normal pulses. Pulmonary:      Effort: Pulmonary effort is normal.      Comments: Diminished breath sound on the right side with dullness on percussion  Abdominal:      General: Abdomen is flat. Musculoskeletal:         General: Normal range of motion. Cervical back: Normal range of motion. Skin:     General: Skin is warm. Neurological:      Mental Status: She is alert. Labs:    Recent Labs     23  0847 23  0746   WBC 10.8 10.5   HGB 7.3* 7.7*    261   INR  --  1.2*   APTT  --  29.9       Recent Labs     23  0847 23  0746    133*   K 3.9 4.9   CL 96* 94*   CO2 33* 35*   GLU 73 100   BUN 43* 36*   CREA 3.58* 2.90*   CA 8.7 8.5   ALB  --  2.1*   ALT  --  22       No results for input(s): PH, PCO2, PO2, HCO3, FIO2 in the last 72 hours. No results for input(s): CPK, CKNDX, TROIQ in the last 72 hours.     No lab exists for component: CPKMB  No results found for: BNPP, BNP   No results found for: CULTNo results found for: TSH, TSHEXT, TSHEXT    Imaging:    CXR Results  (Last 48 hours)                 02/24/23 1049  XR CHEST PORT Final result    Impression:  1. Enlarged cardiac silhouette, mild interstitial edema   2. Elevation of the right hemidiaphragm with associated right pleural effusion           Narrative:  INDICATION:  cp        EXAM: Single portable view of chest 1040 . Comparison:None       Findings: Cardiac silhouette is enlarged. Pulmonary vasculature is prominent. Small to moderate right pleural effusion. Tunneled right sided central venous   catheter. Right hemidiaphragm is elevated. Minimal interstitial prominence. Results from [place] encounter on 02/24/23    XR CHEST PORT    Narrative  INDICATION:  cp    EXAM: Single portable view of chest 1040 . Comparison:None    Findings: Cardiac silhouette is enlarged. Pulmonary vasculature is prominent. Small to moderate right pleural effusion. Tunneled right sided central venous  catheter. Right hemidiaphragm is elevated. Minimal interstitial prominence. Impression  1. Enlarged cardiac silhouette, mild interstitial edema  2. Elevation of the right hemidiaphragm with associated right pleural effusion    Results from Hospital Encounter encounter on 02/24/23    CT ABD PELV W WO CONT    Narrative  EXAM: CT ABD PELV W WO CONT    INDICATION: brbpr ESRD on HD    COMPARISON: CT of the abdomen/pelvis dated 9 September 2021. IV CONTRAST: 100 mL of Isovue-370. ORAL CONTRAST: None    TECHNIQUE:  Multislice helical CT was performed from the diaphragm to the pubic symphysis  prior to intravenous contrast administration and from the diaphragm to the  symphysis pubis during uneventful rapid bolus intravenous contrast  administration. CT acquisitions were performed in the unenhanced, arterial,  portal venous, and delayed phases. Contiguous 2.5 mm axial images were  reconstructed and lung and soft tissue windows were generated. Coronal and  sagittal reformations were generated. CT dose reduction was achieved through  use of a standardized protocol tailored for this examination and automatic  exposure control for dose modulation. FINDINGS:  LOWER THORAX: There is a moderate to large volume right-sided pleural effusion  with associated atelectasis of the RIGHT middle and RIGHT lower lobes. There are  air loculations within the effusion above the diaphragm. The heart is moderately  enlarged. A central venous catheter tip is seen at the RIGHT atrium. The RIGHT  hemidiaphragm is elevated. LIVER: The liver measures 19 cm longitudinal. No abnormally enhancing liver mass  is seen. BILIARY TREE: Gallbladder is within normal limits. CBD is not dilated. No  calcified gallstone. SPLEEN: within normal limits. PANCREAS: No mass or ductal dilatation. ADRENALS: Unremarkable. KIDNEYS: No mass, calculus, or hydronephrosis. There is a 2 cm nonenhancing  simple cyst at the midpole of the RIGHT kidney. STOMACH: Unremarkable. SMALL BOWEL: No dilatation or wall thickening. No CT evidence of active  hemorrhage. COLON: No dilatation or wall thickening. No CT evidence of active hemorrhage. There are numerous colonic diverticula, but no evidence of acute diverticulitis. APPENDIX: Not clearly visualized. There is a small amount of free fluid within  the RIGHT paracolic gutter. PERITONEUM: There is a small volume of ascites fluid. No pneumoperitoneum. RETROPERITONEUM: No lymphadenopathy or aortic aneurysm. The abdominal aorta is  normal in caliber. The celiac, superior mesenteric, and inferior mesenteric  arteries are patent. Bilateral common iliac and external iliac arteries are  patent. REPRODUCTIVE ORGANS: No adnexal mass. URINARY BLADDER: No mass or calculus. BONES: No destructive bone lesion. ABDOMINAL WALL: No mass or hernia. There is diffuse superficial soft tissue  edema. ADDITIONAL COMMENTS: N/A    Impression  1.  No CT evidence of active gastrointestinal hemorrhage. 2. Large right-sided pleural effusion containing loculations of air. There is  atelectasis of the RIGHT middle and RIGHT lower lobes. 3. Small amount of free fluid in the pelvis. 4. Colonic diverticulosis. No evidence of acute diverticulitis.         IMPRESSION:   Large right pleural effusion with loculation  Atelectasis right middle and right lower lobe  Chronic kidney disease stage IV  End-stage renal disease on dialysis  Fluid overload secondary to above  Anemia secondary most likely GI bleed      RECOMMENDATIONS/PLAN:     76-year-old lady came in because of rectal pain because of constipation  CAT scan of the abdomen shows large right pleural effusion with some loculation  Get IR for diagnostic and therapeutic thoracentesis after repeating chest x-ray after dialysis on Monday  Continue with the dialysis  Constipation continue with a stool softener follow-up hemoglobin  BNP is greater than 35,000 secondary to renal failure which is chronic         Em Maldonado MD

## 2023-02-26 NOTE — PROGRESS NOTES
Problem: General Medical Care Plan  Goal: *Vital signs within specified parameters  Outcome: Progressing Towards Goal  Goal: *Labs within defined limits  Outcome: Progressing Towards Goal  Goal: *Absence of infection signs and symptoms  Outcome: Progressing Towards Goal  Goal: *Optimal pain control at patient's stated goal  Outcome: Progressing Towards Goal     Problem: Falls - Risk of  Goal: *Absence of Falls  Description: Document Shital Wisdom Fall Risk and appropriate interventions in the flowsheet.   Outcome: Progressing Towards Goal  Note: Fall Risk Interventions:  Mobility Interventions: Patient to call before getting OOB         Medication Interventions: Patient to call before getting OOB, Teach patient to arise slowly

## 2023-02-26 NOTE — PROGRESS NOTES
Ms Alan Freed was lying in bed quite comfortably. The events were noted. Plans are in place for a thoracocentesis tomorrow. Dialysis again on Monday (2/27). Dr Paulina Louis will follow.

## 2023-02-27 ENCOUNTER — APPOINTMENT (OUTPATIENT)
Dept: INTERVENTIONAL RADIOLOGY/VASCULAR | Age: 68
DRG: 177 | End: 2023-02-27
Attending: HOSPITALIST
Payer: COMMERCIAL

## 2023-02-27 ENCOUNTER — APPOINTMENT (OUTPATIENT)
Dept: GENERAL RADIOLOGY | Age: 68
DRG: 177 | End: 2023-02-27
Attending: PHYSICIAN ASSISTANT
Payer: COMMERCIAL

## 2023-02-27 ENCOUNTER — APPOINTMENT (OUTPATIENT)
Dept: GENERAL RADIOLOGY | Age: 68
DRG: 177 | End: 2023-02-27
Attending: INTERNAL MEDICINE
Payer: COMMERCIAL

## 2023-02-27 VITALS
SYSTOLIC BLOOD PRESSURE: 107 MMHG | HEART RATE: 66 BPM | RESPIRATION RATE: 18 BRPM | TEMPERATURE: 98.1 F | BODY MASS INDEX: 27.33 KG/M2 | HEIGHT: 59 IN | OXYGEN SATURATION: 96 % | DIASTOLIC BLOOD PRESSURE: 71 MMHG | WEIGHT: 135.58 LBS

## 2023-02-27 PROCEDURE — 87077 CULTURE AEROBIC IDENTIFY: CPT

## 2023-02-27 PROCEDURE — 88112 CYTOPATH CELL ENHANCE TECH: CPT

## 2023-02-27 PROCEDURE — 87186 SC STD MICRODIL/AGAR DIL: CPT

## 2023-02-27 PROCEDURE — 36415 COLL VENOUS BLD VENIPUNCTURE: CPT

## 2023-02-27 PROCEDURE — 74011000250 HC RX REV CODE- 250: Performed by: NURSE PRACTITIONER

## 2023-02-27 PROCEDURE — 0W993ZZ DRAINAGE OF RIGHT PLEURAL CAVITY, PERCUTANEOUS APPROACH: ICD-10-PCS | Performed by: STUDENT IN AN ORGANIZED HEALTH CARE EDUCATION/TRAINING PROGRAM

## 2023-02-27 PROCEDURE — 32555 ASPIRATE PLEURA W/ IMAGING: CPT

## 2023-02-27 PROCEDURE — 74011250637 HC RX REV CODE- 250/637: Performed by: NURSE PRACTITIONER

## 2023-02-27 PROCEDURE — 87205 SMEAR GRAM STAIN: CPT

## 2023-02-27 PROCEDURE — 71045 X-RAY EXAM CHEST 1 VIEW: CPT

## 2023-02-27 PROCEDURE — 88305 TISSUE EXAM BY PATHOLOGIST: CPT

## 2023-02-27 PROCEDURE — 74011250637 HC RX REV CODE- 250/637: Performed by: INTERNAL MEDICINE

## 2023-02-27 RX ADMIN — POLYETHYLENE GLYCOL 3350 17 G: 17 POWDER, FOR SOLUTION ORAL at 08:27

## 2023-02-27 RX ADMIN — NEPHROCAP 1 CAPSULE: 1 CAP ORAL at 08:29

## 2023-02-27 RX ADMIN — SEVELAMER CARBONATE 1600 MG: 800 TABLET, FILM COATED ORAL at 08:27

## 2023-02-27 RX ADMIN — SODIUM CHLORIDE, PRESERVATIVE FREE 10 ML: 5 INJECTION INTRAVENOUS at 04:13

## 2023-02-27 NOTE — CONSULTS
Consult    NAME: Roz Man   :  1955   MRN:  [id number]     Date/Time:  2023 2:48 PM    Patient PCP: Shweta Patel MD  ________________________________________________________________________     Assessment:   Primary cardiologist: [facility]  Jesús Zamora MD)    PROBLEM LIST:   1. Patient presents for evaluation of constipation  2. Rectal bleed  3. End-stage renal disease-dialysis dependent  4. Elevated cardiac enzymes in the indeterminate range  5. Cardiomyopathy (LVEF 20-25%)  6. Heart failure with reduced ejection fraction (HFrEF)  7. Mild pulmonary hypertension (RVSP = 41 mmHg  8. Moderate to severe valvular heart disease:       8a. Moderate tricuspid valve regurgitation       8b. Mild pulmonic valve regurgitation       8c. Moderate to severe mitral valve regurgitation       8d. Mild aortic valve regurgitation  9. Hypertension  10. Dyslipidemia    11. Former smoker (stopped over 40 years ago)  15. Large right pleural effusion with loculation  13. Anemia  14. Hypochloremia  15. Transaminitis        []        High complexity decision making was performed        Subjective:   CHIEF COMPLAINT:     HISTORY OF PRESENT ILLNESS:     Vanessa Sanchez, is a 69-year-old -American female with no known coronary artery disease. The patient is well-known to me from prior visits. The patient established with our practice 2 months ago. Her evaluation revealed cardiomyopathy with an ejection fraction of 10-15%. She was scheduled for outpatient left heart catheterization (LHC). Apparently the patient presented to [facility] (). She was begun on hemodialysis. Her cardiovascular evaluation is unclear. Specifically, she does not recall the name of any cardiologist, or any ischemic procedures including left heart catheterization. The patient presented to this facility for constipation, and rectal bleeding.   She denied any cardiovascular complaints. Specifically, she denied chest pain, pressure or tightness. Further there is no shortness of breath. She was found to have a right loculated pleural effusion. She underwent thoracentesis. Cardiology is consulted to assist in evaluation and management. Past Medical History:   Diagnosis Date    Heart failure ([address]     Hypertension     Kidney disease       Past Surgical History:   Procedure Laterality Date    HX KNEE REPLACEMENT Right     HX UROLOGICAL  09/23/2021    cystospoy    IR THORACENTESIS NDL PUNC ASP W IMAGE  2/27/2023     No Known Allergies   Meds:  See below  Social History     Tobacco Use    Smoking status: Former     Years: 10.00     Types: Cigarettes    Smokeless tobacco: Never   Substance Use Topics    Alcohol use: Not Currently      Family History   Problem Relation Age of Onset    Hypertension Mother        REVIEW OF SYSTEMS:    Above, otherwise noncontributory. Objective:      Physical Exam:    Last 24hrs VS reviewed since prior progress note. Most recent are:    Visit Vitals  /71 (BP 1 Location: Left upper arm, BP Patient Position: Semi fowlers)   Pulse 66   Temp 98.1 °F (36.7 °C)   Resp 18   Ht 4' 11\" (1.499 m)   Wt 61.5 kg (135 lb 9.3 oz)   SpO2 96%   BMI 27.38 kg/m²     No intake or output data in the 24 hours ending 02/27/23 1448     General Appearance: Well developed, no acute respiratory distress. Ears/Nose/Mouth/Throat: Atraumatic, normocephalic. PERRL. Neck: Supple. JVP within normal limits. Carotids good upstrokes, with no bruit. Chest: Lungs clear to auscultation bilaterally. Cardiovascular: JVP is not elevated, PMI is not attempted. Normal intensity S1-S2, without S3. Abdomen: Soft, non-tender, bowel sounds are active. No organomegaly. Extremities: No edema bilaterally. Skin: Warm and dry. Neuro: CN II-XII grossly intact, without gross motor/sensory deficit.     Data:      Telemetry:    EKG:  []  No new EKG for review  XR CHEST PORT   Final Result      No pneumothorax following thoracentesis   Persistent right hemidiaphragm elevation and right basilar atelectasis         IR THORACENTESIS NDL PUNC ASP W IMAGE   Final Result   Technically successful ultrasound guided right thoracentesis yielding   approximately 150 ml of thick milliliters lizz fluid. XR CHEST PORT   Final Result      Mild interstitial edema   Persistent right pleural effusion with right basilar airspace      CT ABD PELV W WO CONT   Final Result      1. No CT evidence of active gastrointestinal hemorrhage. 2. Large right-sided pleural effusion containing loculations of air. There is   atelectasis of the RIGHT middle and RIGHT lower lobes. 3. Small amount of free fluid in the pelvis. 4. Colonic diverticulosis. No evidence of acute diverticulitis. XR CHEST PORT   Final Result   1. Enlarged cardiac silhouette, mild interstitial edema   2. Elevation of the right hemidiaphragm with associated right pleural effusion               Prior to Admission medications    Medication Sig Start Date End Date Taking? Authorizing Provider   sevelamer carbonate (RENVELA) 800 mg tab tab Take 1,600 mg by mouth three (3) times daily. Yes Provider, Historical   carvediloL (COREG) 12.5 mg tablet Take 12.5 mg by mouth two (2) times a day. Yes Provider, Historical       No results found for this or any previous visit (from the past 24 hour(s)). Plan:   1. From a cardiovascular standpoint the patient may be discharged home  2. Continue telemetry monitor while hospitalized  3. Continue to monitor serum electrolytes, and renal function  4. Continue to monitor fluid balance, and daily weights  5. Continue current cardiovascular medications including carvedilol, and valsartan    6. Consider LifeVest  7.   The patient is to follow-up in office within 1 to 2 weeks after discharge   Garrett Flower MD

## 2023-02-27 NOTE — PROGRESS NOTES
PULMONARY NOTE  [facility]    Name: Xavier Prescott MRN: [id number]   : 1955 Hospital: [facility]   Date: 2023  Admission date: 2023 Hospital Day: 4       HPI:     Hospital Problems  Date Reviewed: 2022            Codes Class Noted POA    Dehydration ICD-10-CM: E86.0  ICD-9-CM: 276.51  2023 Yes        Rectal bleeding ICD-10-CM: K62.5  ICD-9-CM: 569.3  2023 Yes        Pleural effusion ICD-10-CM: J90  ICD-9-CM: 511.9  2023 Yes        Hard stool ICD-10-CM: R19.5  ICD-9-CM: 787.7  2023 Yes        Non-STEMI (non-ST elevated myocardial infarction) [facility]) ICD-10-CM: I21.4  ICD-9-CM: 410.70  2023 Yes        Transaminitis ICD-10-CM: R74.01  ICD-9-CM: 790.4  2023 Yes        Chronic kidney disease (CKD), stage IV (severe) ([facility] [address] ICD-10-CM: N18.4  ICD-9-CM: 585.4  2021 Yes    Overview Addendum 2022 12:38 PM by Jesse Castle, NP     21 creatinine was 5.51    21: obstructive uropathy (?); she is able to void half of her bladder capacity; advised on timed voiding. 9/3/21 creatinine was 4.03    10/4/21 creatinine was 4.67    21 creatinine was 4.08                   [x] High complexity decision making was performed  [x] See my orders for details      Subjective/Initial History:     I was asked by Ismael Razo MD to see Xavier Prescott  a 70 y.o.  female in consultation     Excerpts from admission 2023 or consult notes as follows:   62-year-old lady came in because of generalized weakness shortness of breath and she was having rectal pain and constipation and starting having bleeding because of the strain.   She was recently started on dialysis at [facility] after the cardiac catheterization, she came in because of fecal impaction rectal pain chest x-ray and CAT scan of the abdomen was done which shows large right pleural effusion possible loculated and some atelectasis of right middle and right lower lobe she is on room air history of smoking in the past no history of hemoptysis so admitted and pulmonary consult was called      No Known Allergies     MAR reviewed and pertinent medications noted or modified as needed     Current Facility-Administered Medications   Medication    valsartan (DIOVAN) tablet 40 mg    epoetin erich-epbx (RETACRIT) injection 10,000 Units    iron sucrose (VENOFER) injection 200 mg    heparin (porcine) 1,000 unit/mL injection 3,200 Units    B complex-vitaminC-folic acid (NEPHROCAP) cap    sodium chloride (NS) flush 5-40 mL    sodium chloride (NS) flush 5-40 mL    acetaminophen (TYLENOL) tablet 650 mg    Or    acetaminophen (TYLENOL) suppository 650 mg    bisacodyL (DULCOLAX) tablet 5 mg    ondansetron (ZOFRAN ODT) tablet 4 mg    Or    ondansetron (ZOFRAN) injection 4 mg    polyethylene glycol (MIRALAX) packet 17 g    carvediloL (COREG) tablet 12.5 mg    sevelamer carbonate (RENVELA) tab 1,600 mg      Patient PCP: Pooja Acevedo MD  PMH:  has a past medical history of Heart failure ([address] Hypertension, and Kidney disease. PSH:   has a past surgical history that includes hx knee replacement (Right) and hx urological (09/23/2021). FHX: family history includes Hypertension in her mother. SHX:  reports that she has quit smoking. She has never used smokeless tobacco. She reports that she does not currently use alcohol. She reports that she does not use drugs. ROS:    Review of Systems   Constitutional:  Positive for malaise/fatigue. HENT: Negative. Eyes: Negative. Respiratory: Negative. Cardiovascular: Negative. Gastrointestinal:  Positive for abdominal pain and blood in stool. Genitourinary: Negative. Musculoskeletal: Negative. Skin: Negative. Neurological: Negative.        Objective:     Vital Signs: Telemetry:    normal sinus rhythm Intake/Output:   Visit Vitals  BP (!) 95/59 (BP 1 Location: Left upper arm, BP Patient Position: At rest;Lying) Comment: nurse notified   Pulse 84 Temp 97.6 °F (36.4 °C)   Resp 20   Ht 4' 11\" (1.499 m)   Wt 61.5 kg (135 lb 9.3 oz)   SpO2 100%   BMI 27.38 kg/m²       Temp (24hrs), Av.9 °F (36.6 °C), Min:97.6 °F (36.4 °C), Max:98.5 °F (36.9 °C)        O2 Device: None (Room air)         Wt Readings from Last 4 Encounters:   23 61.5 kg (135 lb 9.3 oz)   22 61.2 kg (135 lb)   21 63.5 kg (140 lb)   21 65.3 kg (144 lb)        No intake or output data in the 24 hours ending 23 0929      Last shift:      No intake/output data recorded. Last 3 shifts: No intake/output data recorded. Physical Exam:     Physical Exam  Constitutional:       Appearance: Normal appearance. HENT:      Head: Normocephalic and atraumatic. Nose: Nose normal.      Mouth/Throat:      Mouth: Mucous membranes are moist.   Eyes:      Pupils: Pupils are equal, round, and reactive to light. Cardiovascular:      Rate and Rhythm: Normal rate. Pulses: Normal pulses. Pulmonary:      Effort: Pulmonary effort is normal.      Comments: Diminished breath sound on the right side with dullness on percussion  Abdominal:      General: Abdomen is flat. Musculoskeletal:         General: Normal range of motion. Cervical back: Normal range of motion. Skin:     General: Skin is warm. Neurological:      Mental Status: She is alert. Labs:    Recent Labs     23  0847   WBC 10.8   HGB 7.3*          Recent Labs     23  0847      K 3.9   CL 96*   CO2 33*   GLU 73   BUN 43*   CREA 3.58*   CA 8.7       No results for input(s): PH, PCO2, PO2, HCO3, FIO2 in the last 72 hours. No results for input(s): CPK, CKNDX, TROIQ in the last 72 hours.     No lab exists for component: CPKMB  No results found for: BNPP, BNP   Lab Results   Component Value Date/Time    Culture result: No growth after 19 hours 2023 03:00 PM   No results found for: TSH, TSHEXT, TSHEXT    Imaging:    CXR Results  (Last 48 hours)      None          Results from Hospital Encounter encounter on 02/24/23    XR CHEST PORT    Narrative  INDICATION:  cp    EXAM: Single portable view of chest 1040 . Comparison:None    Findings: Cardiac silhouette is enlarged. Pulmonary vasculature is prominent. Small to moderate right pleural effusion. Tunneled right sided central venous  catheter. Right hemidiaphragm is elevated. Minimal interstitial prominence. Impression  1. Enlarged cardiac silhouette, mild interstitial edema  2. Elevation of the right hemidiaphragm with associated right pleural effusion    Results from Hospital Encounter encounter on 02/24/23    CT ABD PELV W WO CONT    Narrative  EXAM: CT ABD PELV W WO CONT    INDICATION: brbpr ESRD on HD    COMPARISON: CT of the abdomen/pelvis dated 9 September 2021. IV CONTRAST: 100 mL of Isovue-370. ORAL CONTRAST: None    TECHNIQUE:  Multislice helical CT was performed from the diaphragm to the pubic symphysis  prior to intravenous contrast administration and from the diaphragm to the  symphysis pubis during uneventful rapid bolus intravenous contrast  administration. CT acquisitions were performed in the unenhanced, arterial,  portal venous, and delayed phases. Contiguous 2.5 mm axial images were  reconstructed and lung and soft tissue windows were generated. Coronal and  sagittal reformations were generated. CT dose reduction was achieved through  use of a standardized protocol tailored for this examination and automatic  exposure control for dose modulation. FINDINGS:  LOWER THORAX: There is a moderate to large volume right-sided pleural effusion  with associated atelectasis of the RIGHT middle and RIGHT lower lobes. There are  air loculations within the effusion above the diaphragm. The heart is moderately  enlarged. A central venous catheter tip is seen at the RIGHT atrium. The RIGHT  hemidiaphragm is elevated. LIVER: The liver measures 19 cm longitudinal. No abnormally enhancing liver mass  is seen.   BILIARY TREE: Gallbladder is within normal limits. CBD is not dilated. No  calcified gallstone. SPLEEN: within normal limits. PANCREAS: No mass or ductal dilatation. ADRENALS: Unremarkable. KIDNEYS: No mass, calculus, or hydronephrosis. There is a 2 cm nonenhancing  simple cyst at the midpole of the RIGHT kidney. STOMACH: Unremarkable. SMALL BOWEL: No dilatation or wall thickening. No CT evidence of active  hemorrhage. COLON: No dilatation or wall thickening. No CT evidence of active hemorrhage. There are numerous colonic diverticula, but no evidence of acute diverticulitis. APPENDIX: Not clearly visualized. There is a small amount of free fluid within  the RIGHT paracolic gutter. PERITONEUM: There is a small volume of ascites fluid. No pneumoperitoneum. RETROPERITONEUM: No lymphadenopathy or aortic aneurysm. The abdominal aorta is  normal in caliber. The celiac, superior mesenteric, and inferior mesenteric  arteries are patent. Bilateral common iliac and external iliac arteries are  patent. REPRODUCTIVE ORGANS: No adnexal mass. URINARY BLADDER: No mass or calculus. BONES: No destructive bone lesion. ABDOMINAL WALL: No mass or hernia. There is diffuse superficial soft tissue  edema. ADDITIONAL COMMENTS: N/A    Impression  1. No CT evidence of active gastrointestinal hemorrhage. 2. Large right-sided pleural effusion containing loculations of air. There is  atelectasis of the RIGHT middle and RIGHT lower lobes. 3. Small amount of free fluid in the pelvis. 4. Colonic diverticulosis. No evidence of acute diverticulitis.         IMPRESSION:   Large right pleural effusion with loculation  Atelectasis right middle and right lower lobe  Chronic kidney disease stage IV  End-stage renal disease on dialysis  Fluid overload secondary to above  Anemia secondary most likely GI bleed      RECOMMENDATIONS/PLAN:     77-year-old lady came in because of rectal pain because of constipation  CAT scan of the abdomen shows large right pleural effusion with some loculation  Get IR for diagnostic and therapeutic thoracentesis after repeating chest x-ray after dialysis on Monday  Continue with the dialysis  Constipation continue with a stool softener follow-up hemoglobin  BNP is greater than 35,000 secondary to renal failure which is chronic  IR for possible thoracentesis right pleural effusion         Odalis Bowman MD

## 2023-02-27 NOTE — PROGRESS NOTES
OT orders received and acknowledged, per PT evaluation pt currently being recommended for home w/ HHOT. Pt currently has no skilled acute care OT needs, please reorder if status changes. Current OT orders discontinued at this time. Thank you.

## 2023-02-27 NOTE — PROGRESS NOTES
This patient is followed as an outpatient by Dr. Pricilla Machado and has an appointment later this week with him. Will change over consult to him at this time.

## 2023-02-27 NOTE — PROGRESS NOTES
Patient reviewed and signed discharge instructions related to follow up appts, care of post thoracentesis site, medication list update, s/s to return to ER.

## 2023-02-27 NOTE — PROGRESS NOTES
Problem: General Medical Care Plan  Goal: *Vital signs within specified parameters  Outcome: Progressing Towards Goal  Goal: *Labs within defined limits  Outcome: Progressing Towards Goal  Goal: *Absence of infection signs and symptoms  Outcome: Progressing Towards Goal  Goal: *Optimal pain control at patient's stated goal  Outcome: Progressing Towards Goal  Goal: *Skin integrity maintained  Outcome: Progressing Towards Goal     Problem: Falls - Risk of  Goal: *Absence of Falls  Description: Document Willi Fall Risk and appropriate interventions in the flowsheet.   Outcome: Progressing Towards Goal  Note: Fall Risk Interventions:  Mobility Interventions: Patient to call before getting OOB, PT Consult for mobility concerns         Medication Interventions: Patient to call before getting OOB, Teach patient to arise slowly

## 2023-02-27 NOTE — PROGRESS NOTES
Interventional Radiology Procedure Note        2/27/2023    Provider: Ofe Morales PA-C  Supervising Physician: Sridhar Waddell MD      Pre-Procedure Diagnosis: complex right pleural effusion  Post-Procedure Diagnosis: complex right pleural effusion, likely empyema    Informed consent obtained    Procedure(s): US-guided right thoracentesis performed. US imaging shows loculated effusion with fluid and gas visualized. 8F centesis catheter used- culd only aspirate ~150cc of thick lizz fluid with strands of purulence noted; gas was also aspirated. Fluid collected was noted to be malodorous. Centesis catheter clogged and was removed. Samples sent for the requested testing. Pt tolerated procedure well. STAT CXR was obtained.      Sedation: none    Specimens removed: 150cc of thick, malodorous lizz fluid with strands of purulence     Complications: none    Recomendations: eval for decortication    Discharge Disposition: fair      Full PACS report to follow    Ofe Morales PA-C  Interventional Radiology

## 2023-02-27 NOTE — PROGRESS NOTES
Progress Note    Patient: Angella Parker MRN: [id number]  SSN: [id number]    YOB: 1955  Age: 70 y.o. Sex: female      Admit Date: 2/24/2023    LOS: 3 days     Subjective:     68F, h/o ESRD on HD (recent), CHF unknwon EF, chronic pain s/p intrathecal pump presented with rectal pain (resolved) but found to have significant pleural effusion. HOSPITAL COURSE:   Significant labs on admission hemoglobin is 7.7, sodium 133, chloride 94, CO2 35, anion gap 4, BUN 36, creatinine 2.9, troponin 84 and BNP greater than 35,000. Bilirubin total is 1.1, AST 51 and alkaline phos 199. Chest x-ray reveals elevated right hemidiaphragm with associated right pleural effusion. CTA of abdomen and pelvis negative for gastrointestinal hemorrhage does reveal large right-sided pleural effusion containing loculations of air there is atelectasis of the right middle and right lower lobes, small amount of free fluid in the pelvis. IR for thoracenthesis on Monday. Seen by cardiology- no intervention. On 2/27 underwent IR thoracenthesis. Will pleural fluis analysis and repeat CXR in AM          Review of Systems:  Review of Systems   Constitutional:  Positive for malaise/fatigue. Respiratory:  Negative for cough and shortness of breath. Cardiovascular:  Negative for chest pain. Gastrointestinal:  Positive for blood in stool and constipation. Musculoskeletal:  Positive for myalgias. Neurological:  Positive for weakness. Psychiatric/Behavioral:  The patient is nervous/anxious. Objective:     Vitals:    02/27/23 0411 02/27/23 0413 02/27/23 0734 02/27/23 1113   BP: 102/66  (!) 95/59 95/63   Pulse: 83  84 86   Resp: 18  20 20   Temp: 98 °F (36.7 °C)  97.6 °F (36.4 °C)    SpO2: 98%  100% 100%   Weight:  61.5 kg (135 lb 9.3 oz)     Height:            Intake and Output:  Current Shift: No intake/output data recorded. Last three shifts: No intake/output data recorded.       Physical Exam:  Physical Exam  Constitutional:       Appearance: She is ill-appearing. Cardiovascular:      Rate and Rhythm: Regular rhythm. Tachycardia present. Pulmonary:      Effort: No respiratory distress. Abdominal:      General: There is no distension. Musculoskeletal:      Right lower leg: No edema. Left lower leg: No edema. Skin:     General: Skin is dry. Neurological:      Motor: Weakness present. Lab/Data Review:  No results found for this or any previous visit (from the past 24 hour(s)). Assessment and plan:      (1) increased troponin s/t ESRD and MI typeII,.    (2) CHF unknown EF: pending ECHO    (3) ESRD on HD: on HD    (4) right sided pleural effusion: s/p IR drainage     (5) Right-sided pleural effusion  -CTA of abdomen and pelvis negative for gastrointestinal hemorrhage does reveal large right-sided pleural effusion containing loculations of air there is atelectasis of the right middle and right lower lobes, small amount of free fluid in the pelvis. IR for thoracenthesis     (6) Anal pain  Rectal bleeding  -Hard stool noted in rectal vault, relieved with enema and manual disimpaction      DISPOSITION: On 2/27 underwent IR thoracenthesis.  Will await pleural fluidan alysis and repeat CXR in AM     DVT Prophylaxis: SCDs  Code Status: Full code  POA/NOK:  Signed By: Juanjo Muniz MD     February 27, 2023

## 2023-02-27 NOTE — ADT AUTH CERT NOTES
[facility]     FACILITY NPI : [id number]  FACILITY TAX ID :  [id number]     [facility] [place] CARDIAC TELEMETRY  [address] [address]  [address]: [phone]  FX: [phone]      Patient Name :Van Sr   : 1955 (70 yrs)  MRN : [id number]     Patient Mailing Address [address] [place] [address] [47] , [address]                                                               . Insurance Plan Payor: BLUE CROSS / Plan: [address] [place] [address] / Product Type: PPO /      Primary Coverage Subscriber ID : [id number]         Current Patient Class : INPATIENT  Admit Date : 2023     REQUESTED LEVEL OF CARE: INPATIENT [101]                                                           Diagnosis : Dehydration;Pleural effusion; Rectal bleeding;Hard stool                          ICD10 Code : Dehydration [E86.0]  Pleural effusion [J90]  Rectal bleeding [K62.5]  Hard stool [R19.5]     Current Room and Bed 488/01     Admitting and Attending Info:  Admitting Provider : Rhea Law MD   NPI: [id number]  Admitting Provider Phone.  [phone]  Admitting Provider Address: [address] [place] [address]     Attending Provider Martha Schultz MD   [id number]  Attending Provider Address:  [place] [address]     Attending Provider Phone: Attending phys phone: [phone]       Utilization Reviews       Dehydration - Care Day 2 (2023) by Margie Bradshaw RN       Review Entered Review Status   2023 0809 Completed      Criteria Review      Care Day: 2 Care Date: 2023 Level of Care: Inpatient Floor    Guideline Day 2    Level Of Care (X) Floor    2/27/2023 14:31:05 EST by Lorriane Christopher      2/25 medical    Clinical Status    (X) * Hypotension absent    2/27/2023 65:45:51 EST by Lorriane Christopher      111/54 94/58   98.1 89 95/53 16 90% ra 62.3 kg    (X) * Electrolyte abnormalities absent or improved    2/27/2023 08:08:22 EST by Lorriane Christopher      na 136 k 3.9 cl 96 co2 33    ( ) * Cause of dehydration requiring inpatient treatment absent    2/27/2023 08:08:22 EST by Lorriane Christopher      monitoring    (X) * Renal function at baseline, stable or improved    2/27/2023 08:08:22 EST by Lorriane Christopher      bun 43 cr 3.58 (HD)    (X) * Mental status at baseline or improved    2/27/2023 08:08:22 EST by Lorriane Christopher      aaxo4    (X) * Vomiting absent or improved    2/27/2023 85:29:51 EST by Lorriane Christopher      nausea present    (X) * Diarrhea absent or improved    2/27/2023 08:08:22 EST by Lorriane Christopher      na    Activity    (X) Activity as tolerated    2/27/2023 22:68:82 EST by Lorriane Christopher      w assist   PT OT    Routes    (X) IV or oral medications    2/27/2023 08:09:16 EST by Lorriane Christopher      iv retacrit 13696 units tues thur sat dialysis    2/27/2023 92:58:60 EST by Lorriane Christopher      po nephrocap 1 cap daily po renvela 1600mg tid   po coreg 12.5 mg bid  iv iron 200 mg tues thur sat dialysis    (X) Diet as tolerated    2/27/2023 08:08:22 EST by Lorriane Christopher      Regular; 5 carb choices (75 gm/meal); Low Sodium (2 gm)    Medications    (X) Possible antiemetics    2/27/2023 08:08:22 EST by Lorriane Christopher      iv zofran 4 mg q6h prn x2    * Milestone   Additional Notes   2/25/23 LOC IP MEDICAL    H&H 7.3 21.9      Constitutional:        Appearance: She is ill-appearing. Cardiovascular:       Rate and Rhythm: Regular rhythm. Tachycardia present. Pulmonary:       Effort: No respiratory distress. Abdominal:       General: There is no distension. Musculoskeletal:       Right lower leg: No edema. Left lower leg: No edema. Skin:      General: Skin is dry. Neurological:       Motor: Weakness present      Tele sr    Per attending    Non-STEMI   Elevated troponins   -Continue to trend troponins   -Denies chest pain   -EKG nonischemic   -Consult cardiology       CHF exacerbation   Elevated BNP may be related to renal l status   -No previous echo to review   -Cardiology consulted   -Continue PTA medications Coreg       CKD stage IV   -Consult nephrology       Transaminitis   -Elevated bili, alkalinephos, AST       Right-sided pleural effusion   -Breathing comfortably on room air   -Consult pulmonary for recommendations       Anal pain   Rectal bleeding   -Hard stool noted in rectal vault, relieved with enema and manual disimpaction       Dehydration   BMP sodium and chloride low   Gentle fluid hydration      Per pulmonary    large right pleural effusion with some loculation   2. Get IR for diagnostic and therapeutic thoracentesis after repeating chest x-ray after dialysis on Monday   3. Continue with the dialysis      Per nephrology    Complete dialysis today. Hemodialysis will provide clearance, help to maintain normokalemia and her volume status. Ms. Victorina Del Toro is on a Tuesday Thursday Saturday dialysis schedule. Her next dialysis session will be scheduled for Tuesday, February 28 unless there are intervening indications. Epogen will be provided for anemia of chronic kidney disease. The target hemoglobin level is 10 to 11 g/dL. Add renal caps-1 orally once daily. Pt soila 3 hour hemodialysis well, 1.25 liters net fluid removed      Per cardiology    1. Troponin elevation   Asymptomatic and without chest discomfort. In the setting of end-stage renal disease this is actually quite low-no indication of ACS at this time.   This is due to delayed clearance from her renal disease   -No indication for ACS treatment at this time      Elevated NT proBNP/questionable heart failure history   Visit evidence of mild volume overload, however this be just due to her end-stage renal disease.   -Please obtain TTE   -Volume management as per dialysis team      Hypertension agree with continuation of carvedilol         Orders:    Nephrology cardiology consults HD tues thurs sat echo I&O q8h cardiac monitoring 48 hrs         Dehydration - Care Day 1 (2/24/2023) by Laura Stephens       Review Entered Review Status   2/25/2023 1649 Completed      Criteria Review      Care Day: 1 Care Date: 2/24/2023 Level of Care: Inpatient Floor    Guideline Day 1    Clinical Status    (X) * Clinical Indications met    Activity    (X) Activity as tolerated    2/25/2023 16:49:29 EST by Laura Stephens      w/ assist    Routes    (X) IV fluids    2/25/2023 16:49:29 EST by Laura Stephens       ml/hour continuous IV    (X) IV medications    2/25/2023 16:49:29 EST by Laura Stephens      IV Zofran    (X) NPO or diet as tolerated    2/25/2023 16:49:29 EST by Laura Stephens      Adult regular    Interventions    (X) Electrolytes    2/25/2023 16:49:29 EST by Laura Stephens      Sodium: 133 (L)  Potassium: 4.9  Chloride: 94 (L)  CO2: 35 (H)  Anion gap: 4 (L)  Glucose: 100  BUN: 36 (H)  Creatinine: 2.90 (H)    (X) Possible abdominal imaging    2/25/2023 16:49:29 EST by Laura Stephens      CT abd  1. No CT evidence of active gastrointestinal hemorrhage. 2. Large right-sided pleural effusion containing loculations of air. There is  atelectasis of the RIGHT middle and RIGHT lower lobes. 3. Small amount of free fluid in the pelvis. Medications    (X) Possible antiemetics    2/25/2023 16:49:29 EST by Laura Stephens      Zofran 4 mg IV x 1    * Milestone   Additional Notes   DATE:    2/24/23   Day 1   IP, Medical      Chief Complaint/ED Presentation:    70 y.o. female who has a PMH significant for CKD stage IV, unspecified heart failure, chronic pain status post intrathecal pump.   She comes into the emergency room with complaints of rectal pain, unable to pass a hard stool that is located in her rectal vault. She is having bleeding from her rectum. Significant labs on admission hemoglobin is 7.7, sodium 133, chloride 94, CO2 35, anion gap 4, BUN 36, creatinine 2.9, troponin 84 and BNP greater than 35,000. Bilirubin total is 1.1, AST 51 and alkaline phos 199. Chest x-ray reveals elevated right hemidiaphragm with associated right pleural effusion. CTA of abdomen and pelvis negative for gastrointestinal hemorrhage does reveal large right-sided pleural effusion containing loculations of air there is atelectasis of the right middle and right lower lobes, small amount of free fluid in the pelvis. Will admit the patient for further management of dehydration, transaminitis, fluid overload with pleural effusion and right-sided loculated air, fecal impaction in the rectum and non-STEMI. Ordered suppository and enema with good response along with manual digital disimpaction. Will consult pulmonary, cardiology and nephrology. Vitals:   BP 95/61, P 97, R 18, 9*% on RA, T 98.1   Constitutional:        Appearance: She is ill-appearing. Cardiovascular:       Rate and Rhythm: Regular rhythm. Tachycardia present. Pulmonary:       Effort: No respiratory distress. Abdominal:       General: There is no distension. Musculoskeletal:       Right lower leg: No edema. Left lower leg: No edema. Skin:      General: Skin is dry. Neurological:       Motor: Weakness present. Abnl/Pertinent Labs/Radiology/Diagnostic Studies:   CXR -    1. Enlarged cardiac silhouette, mild interstitial edema   2.  Elevation of the right hemidiaphragm with associated right pleural effusion          ED treatment:   Labs, imaging      MD Consults/Assessments & Plans:   Dehydration   BMP sodium and chloride low   Gentle fluid hydration      Non-STEMI   Elevated troponins   -Continue to trend troponins   -Denies chest pain   -EKG nonischemic   -Consult cardiology       CHF exacerbation   Elevated BNP may be related to renal l status   -No previous echo to review   -Cardiology consulted   -Continue PTA medications Coreg       CKD stage IV   -Consult nephrology       Transaminitis   -Elevated bili, alkalinephos, AST       Right-sided pleural effusion   -CTA of abdomen and pelvis negative for gastrointestinal hemorrhage does reveal large right-sided pleural effusion containing loculations of air there is atelectasis of the right middle and right lower lobes, small amount of free fluid in the pelvis. -Breathing comfortably on room air   -Consult pulmonary for recommendations       Anal pain   Rectal bleeding   -Hard stool noted in rectal vault, relieved with enema and manual disimpaction       NEPHROLOGY CONSULT:   Consult appear on list but has not been called yet. She is well-known to our service. Recent new start of dialysis postcardiac catheterization at [facility] several weeks ago. Admitted with rectal pain with fecal impaction. However, noted to have a right pleural effusion. Usual dialysis is Tuesday, Thursday and Saturday. She has very little in the way of interdialytic weight gain. We will plan dialysis tomorrow with ultrafiltration of 1.25 L as tolerated. She does tend to have low blood pressures. Dr. Beltran [facility] will see in a.m.    Dialysis orders written        Dehydration - Clinical Indications for Admission to Inpatient Care by Jean Claude Espana       Review Entered Review Status   2/25/2023 1647 Completed      Criteria Review      Clinical Indications for Admission to Inpatient Care    Most Recent : Jean Claude Espana Most Recent Date: 2/25/2023 16:47:19 EST    (X) Admission is indicated for  1 or more  of the following  (1) (2) (3) (4):       (X) Dehydration that is severe or persistent       2/25/2023 16:47:19 EST by Jean Claude Espana         BUN 36, creatinine 2.9     H&P Notes     H&P by Gely Stein NP at 02/24/23 3656 documented on ED to Hosp-Admission (Current) from 2/24/2023 in [address]    Author: Cindy Ordoñez NP Author Type: Nurse Practitioner Filed: 02/24/23 7169   Note Status: Signed Cosign: Cosigned by Jackson Freire MD at 02/24/23 0163 Date of Service: 02/24/23 1701   : Cindy Ordoñez NP (Nurse Practitioner)               Expand All Collapse All          History and Physical     Patient: Dian Smith MRN: [id number]  SSN: [id number]    YOB: 1955  Age: 70 y.o. Sex: female       Subjective:      Dian Smith is a 70 y.o. female who has a PMH significant for CKD stage IV, unspecified heart failure, chronic pain status post intrathecal pump. She comes into the emergency room with complaints of rectal pain, unable to pass a hard stool that is located in her rectal vault. She is having bleeding from her rectum. Significant labs on admission hemoglobin is 7.7, sodium 133, chloride 94, CO2 35, anion gap 4, BUN 36, creatinine 2.9, troponin 84 and BNP greater than 35,000. Bilirubin total is 1.1, AST 51 and alkaline phos 199. Chest x-ray reveals elevated right hemidiaphragm with associated right pleural effusion. CTA of abdomen and pelvis negative for gastrointestinal hemorrhage does reveal large right-sided pleural effusion containing loculations of air there is atelectasis of the right middle and right lower lobes, small amount of free fluid in the pelvis. Will admit the patient for further management of dehydration, transaminitis, fluid overload with pleural effusion and right-sided loculated air, fecal impaction in the rectum and non-STEMI. Ordered suppository and enema with good response along with manual digital disimpaction. Will consult pulmonary, cardiology and nephrology.              Past Medical History:   Diagnosis Date    Heart failure ([address]      Hypertension      Kidney disease              Past Surgical History:   Procedure Laterality Date    HX KNEE REPLACEMENT Right      HX UROLOGICAL   09/23/2021     cystospoy            Family History   Problem Relation Age of Onset    Hypertension Mother        Social History            Tobacco Use    Smoking status: Former       Years: 10.00       Types: Cigarettes    Smokeless tobacco: Never   Substance Use Topics    Alcohol use: Not Currently              Prior to Admission medications    Medication Sig Start Date End Date Taking? Authorizing Provider   sevelamer carbonate (RENVELA) 800 mg tab tab Take 1,600 mg by mouth three (3) times daily. Yes Provider, Historical   carvediloL (COREG) 12.5 mg tablet Take 12.5 mg by mouth two (2) times a day. Yes Provider, Historical         No Known Allergies     Review of Systems:  Review of Systems   Constitutional:  Positive for malaise/fatigue. Respiratory:  Negative for cough and shortness of breath. Cardiovascular:  Negative for chest pain. Gastrointestinal:  Positive for blood in stool and constipation. Musculoskeletal:  Positive for myalgias. Neurological:  Positive for weakness. Psychiatric/Behavioral:  The patient is nervous/anxious. Objective:      Recent Results          Recent Results (from the past 24 hour(s))   CBC WITH AUTOMATED DIFF     Collection Time: 02/24/23  7:46 AM   Result Value Ref Range     WBC 10.5 3.6 - 11.0 K/uL     RBC 2.91 (L) 3.80 - 5.20 M/uL     HGB 7.7 (L) 11.5 - 16.0 g/dL     HCT 23.4 (L) 35.0 - 47.0 %     MCV 80.4 80.0 - 99.0 FL     MCH 26.5 26.0 - 34.0 PG     MCHC 32.9 30.0 - 36.5 g/dL     RDW 16.8 (H) 11.5 - 14.5 %     PLATELET 532 568 - 842 K/uL     MPV 11.4 8.9 - 12.9 FL     NRBC 0.0 0.0  WBC     ABSOLUTE NRBC 0.00 0.00 - 0.01 K/uL     NEUTROPHILS 82 (H) 32 - 75 %     LYMPHOCYTES 10 (L) 12 - 49 %     MONOCYTES 7 5 - 13 %     EOSINOPHILS 0 0 - 7 %     BASOPHILS 0 0 - 1 %     IMMATURE GRANULOCYTES 1 (H) 0 - 0.5 %     ABS. NEUTROPHILS 8.6 (H) 1.8 - 8.0 K/UL     ABS. LYMPHOCYTES 1.0 0.8 - 3.5 K/UL     ABS. MONOCYTES 0.7 0.0 - 1.0 K/UL     ABS. EOSINOPHILS 0.0 0.0 - 0.4 K/UL     ABS. BASOPHILS 0.0 0.0 - 0.1 K/UL     ABS. IMM. GRANS. 0.1 (H) 0.00 - 0.04 K/UL     DF AUTOMATED     METABOLIC PANEL, COMPREHENSIVE     Collection Time: 02/24/23  7:46 AM   Result Value Ref Range     Sodium 133 (L) 136 - 145 mmol/L     Potassium 4.9 3.5 - 5.1 mmol/L     Chloride 94 (L) 97 - 108 mmol/L     CO2 35 (H) 21 - 32 mmol/L     Anion gap 4 (L) 5 - 15 mmol/L     Glucose 100 65 - 100 mg/dL     BUN 36 (H) 6 - 20 mg/dL     Creatinine 2.90 (H) 0.55 - 1.02 mg/dL     BUN/Creatinine ratio 12 12 - 20       eGFR 17 (L) >60 ml/min/1.73m2     Calcium 8.5 8.5 - 10.1 mg/dL     Bilirubin, total 1.1 (H) 0.2 - 1.0 mg/dL     AST (SGOT) 51 (H) 15 - 37 U/L     ALT (SGPT) 22 12 - 78 U/L     Alk.  phosphatase 199 (H) 45 - 117 U/L     Protein, total 6.7 6.4 - 8.2 g/dL     Albumin 2.1 (L) 3.5 - 5.0 g/dL     Globulin 4.6 (H) 2.0 - 4.0 g/dL     A-G Ratio 0.5 (L) 1.1 - 2.2     TROPONIN-HIGH SENSITIVITY     Collection Time: 02/24/23  7:46 AM   Result Value Ref Range     Troponin-High Sensitivity 84 (H) 0 - 51 ng/L   NT-PRO BNP     Collection Time: 02/24/23  7:46 AM   Result Value Ref Range     NT pro-BNP >35,000 (H) <125 pg/mL   PTT     Collection Time: 02/24/23  7:46 AM   Result Value Ref Range     aPTT 29.9 21.2 - 34.1 sec     aPTT, therapeutic range   82 - 109 sec   PROTHROMBIN TIME + INR     Collection Time: 02/24/23  7:46 AM   Result Value Ref Range     Prothrombin time 15.0 (H) 11.9 - 14.6 sec     INR 1.2 (H) 0.9 - 1.1     TYPE & SCREEN     Collection Time: 02/24/23  7:46 AM   Result Value Ref Range     Crossmatch Expiration 02/27/2023,2359       ABO/Rh(D) Sarita Coombes Positive       Antibody screen Negative     EKG, 12 LEAD, INITIAL     Collection Time: 02/24/23  7:57 AM   Result Value Ref Range     Ventricular Rate 96 BPM     Atrial Rate 96 BPM     P-R Interval 182 ms     QRS Duration 96 ms     Q-T Interval 378 ms     QTC Calculation (Bezet) 477 ms     Calculated P Axis 49 degrees     Calculated R Axis -4 degrees     Calculated T Axis -10 degrees     Diagnosis           Sinus rhythm with occasional Premature ventricular complexes  Anterior infarct , age undetermined  Abnormal ECG  No previous ECGs available  Confirmed by Yaz Mendoza ([id number]) on 2/24/2023 9:42:35 AM               CT ABD PELV W WO CONT   Final Result       1. No CT evidence of active gastrointestinal hemorrhage. 2. Large right-sided pleural effusion containing loculations of air. There is   atelectasis of the RIGHT middle and RIGHT lower lobes. 3. Small amount of free fluid in the pelvis. 4. Colonic diverticulosis. No evidence of acute diverticulitis. XR CHEST PORT   Final Result   1. Enlarged cardiac silhouette, mild interstitial edema   2. Elevation of the right hemidiaphragm with associated right pleural effusion                        Vitals:     02/24/23 0736 02/24/23 0743 02/24/23 0912 02/24/23 1115   BP: 95/61   104/63 111/69   Pulse: 97   97     Resp:   18       Temp: 98.1 °F (36.7 °C)         SpO2: 98%   97% 100%   Weight: 61.2 kg (135 lb)         Height: 5' (1.524 m)               Physical Exam:  Physical Exam  Constitutional:       Appearance: She is ill-appearing. Cardiovascular:      Rate and Rhythm: Regular rhythm. Tachycardia present. Pulmonary:      Effort: No respiratory distress. Abdominal:      General: There is no distension. Musculoskeletal:      Right lower leg: No edema. Left lower leg: No edema. Skin:     General: Skin is dry. Neurological:      Motor: Weakness present. Assessment/Plan:   Active Problems:    Chronic kidney disease (CKD), stage IV (severe) ([address] (8/31/2021)      Overview: 8/5/21 creatinine was 5.51            8/31/21: obstructive uropathy (?); she is able to void half of her bladder       capacity; advised on timed voiding.             9/3/21 creatinine was 4.03            10/4/21 creatinine was 4.67 11/1/21 creatinine was 4.08             Dehydration (2/24/2023)       Rectal bleeding (2/24/2023)       Pleural effusion (2/24/2023)       Hard stool (2/24/2023)       Non-STEMI (non-ST elevated myocardial infarction) ([facility] (2/24/2023)       Transaminitis (2/24/2023)     Non-STEMI  Elevated troponins  -Continue to trend troponins  -Denies chest pain  -EKG nonischemic  -Consult cardiology     CHF exacerbation  Elevated BNP may be related to renal l status  -No previous echo to review  -Cardiology consulted  -Continue PTA medications Coreg     CKD stage IV  -Consult nephrology     Transaminitis  -Elevated bili, alkalinephos, AST     Right-sided pleural effusion  -CTA of abdomen and pelvis negative for gastrointestinal hemorrhage does reveal large right-sided pleural effusion containing loculations of air there is atelectasis of the right middle and right lower lobes, small amount of free fluid in the pelvis.   -Breathing comfortably on room air  -Consult pulmonary for recommendations     Anal pain  Rectal bleeding  -Hard stool noted in rectal vault, relieved with enema and manual disimpaction     Dehydration  BMP sodium and chloride low  Gentle fluid hydration     DVT Prophylaxis: SCDs  Code Status: Full code  POA/NOK:     This care involved high complexity medical decision making: I personally:  Reviewed the flowsheet and previous days notes  Reviewed and summarized records or history from previous days note or discussions with staff, family  High Risk Drug therapy requiring intensive monitoring for toxicity: eg steroids, pressors, antibiotics  Reviewed and/or ordered Clinical lab tests  Reviewed images and/or ordered Radiology tests  Reviewed the patients ECG / Telemetry  discussed my assessment/management with : Nursing, Hospitalist and Family for coordination of care   Total Time spent in direct and indirect care including assessment review of labs and coordination of services and consultations: Greater than 75 minutes     This is dictation was done by dragon, computer voice recognition software. Quite often unanticipated grammatical, syntax, homophones and other interpretive errors or inadvertently transcribed by the computer software. Please excuse errors that have escaped final proofreading. Thank you.       Signed By: Kb Combs NP      February 24, 2023

## 2023-02-27 NOTE — PROGRESS NOTES
0840: Chart reviewed. Per notes, patient followed by cardiology, nephrology and pulmonology. Patient drives self to dialysis at [facility] T-Th-S 5AM.    PT recs noted for [facility]. CM will continue to follow patient and recs of medical team.    1100: Patient off unit. 1300: CM met with patient at bedside for DCP. Patient confirmed she drives herself to dialysis. Patient would like HH. Choice letter signed, placed on chart and multiple referrals sent. 1610: Discharge summary/order noted. [facility] has accepted patient with anticipated [facility] 02/28/2023. CM met with patient at bedside to provide above info verbally and in writing. Understanding verbalized. When transportation available, patient to discharge home with [facility]. Discharge plan of care/case management plan validated with provider discharge order. Discharge Checklist Completed.

## 2023-02-27 NOTE — PROGRESS NOTES
2 person skin assessment performed by NEHAL Caal and NEHAL Thompson, no pressure injuries, right flank band aid (post thoracentesis), dry and intact.

## 2023-02-27 NOTE — PROGRESS NOTES
[facility] CARDIOLOGY  CARDIOLOGY CONSULTATION    REASON FOR CONSULT: Lower extremity edema and elevated NT proBNP and troponin    REQUESTING PROVIDER:  Reynaldo    CHIEF COMPLAINT: Constipation      INTERVAL HISTORY:  2/26/2023: Overall she is feeling well to deep. Seen and evaluated. Echo completed, reveals severely reduced LVEF 25% with moderate to severe MR/TR. She wants to go home. Note she has cardiology scheduled follow-up scheduled for later this week    HISTORY OF PRESENT ILLNESS: Ramos Ernandez is a 65-year-old woman with past medical history for advanced kidney failure, reported prior heart failure, and chronic pain syndrome. She presented with severe constipation and rectal pain due to impacted stool. Initial labs revealed markedly elevated creatinine, troponin of 84 and an NT proBNP greater than 35,000. She notes she occasionally gets leg swelling as she has lots of fluid on board. Does not have any known history of cardiac disease. She denies chest discomfort, pressure, or discomfort in the neck or the jaw or arms while ambulating. She think she has been eating and drinking a normal amount    Records from hospital admission course thus far reviewed. PAST MEDICAL HISTORY:  Notes above. Relevant cardiac issues include elevated NT proBNP, and troponin. HOME MEDICATIONS:  Home medications reviewed, no side effects. ALLERGIES:  Allergies reviewed with the patient    FAMILY HISTORY:  Family history reviewed, no premature cardiac disease. SOCIAL HISTORY: No active tobacco or drug use    REVIEW OF SYSTEMS:  Complete review of systems performed, pertinents noted above, all other systems are negative.       PHYSICAL EXAMINATION:    Visit Vitals  BP 90/61 (BP 1 Location: Left upper arm, BP Patient Position: Semi fowlers)   Pulse 89   Temp 97.6 °F (36.4 °C)   Resp 16   Ht 4' 11\" (1.499 m)   Wt 65.5 kg (144 lb 6.4 oz)   SpO2 98%   BMI 29.17 kg/m²     General: Chronically ill woman, in no acute distress  HEENT: Bitemporal wasting, dry mucous membranes, anicteric sclera  Neck: Elevated jugular venous pressure, approximately 10  Respiratory: Clear lungs bilaterally, no respiratory distress  Cardiac: Regular rate and rhythm, no murmurs  Abdomen: Soft nontender    Extremities, mildly edematous, warm  Neuro: Clear fluent speech  Skin: Intact    Electrocardiogram performed earlier reviewed, it shows     Recent labs results and imaging reviewed. Notable findings include NT proBNP and troponin as above      IMPRESSION AND RECOMMENDATIONS:  Troponin elevation  Asymptomatic and without chest discomfort. In the setting of end-stage renal disease this is actually quite low-no indication of ACS at this time. This is due to delayed clearance from her renal disease  -No indication for ACS treatment at this time    HFrEF-25%  Overloaded. Perfusing well.   -continue carvedilol 12.5 BID  -start valsartan 40 BID for HFrEF/afterload reduction  -needs ischemic workkup, this can be done as outpatient  -defer MRA given the fact that she makes a little urine still    Hypertension agree with continuation of carvedilol      Thank you for this consult, we will continue to follow along. Please not hesitate to call us with questions          Thank you for involving us in the care of this patient. Please do not hesitate to call if additional questions arise.

## 2023-02-27 NOTE — DISCHARGE SUMMARY
Physician Discharge Summary     Patient ID:    Laxmi Juarez  [id number]  32 y.o.  1955    Admit date: 2/24/2023    Discharge date : 2/27/2023    Chronic Diagnoses:    Problem List as of 2/27/2023 Date Reviewed: 1/13/2022            Codes Class Noted - Resolved    Dehydration ICD-10-CM: E86.0  ICD-9-CM: 276.51  2/24/2023 - Present        Rectal bleeding ICD-10-CM: K62.5  ICD-9-CM: 569.3  2/24/2023 - Present        Pleural effusion ICD-10-CM: J90  ICD-9-CM: 511.9  2/24/2023 - Present        Hard stool ICD-10-CM: R19.5  ICD-9-CM: 787.7  2/24/2023 - Present        Non-STEMI (non-ST elevated myocardial infarction) [facility]) ICD-10-CM: I21.4  ICD-9-CM: 410.70  2/24/2023 - Present        Transaminitis ICD-10-CM: R74.01  ICD-9-CM: 790.4  2/24/2023 - Present        Acute cystitis without hematuria ICD-10-CM: N30.00  ICD-9-CM: 595.0  9/23/2021 - Present    Overview Addendum 1/13/2022 10:40 AM by Dallas Robles NP     9/23/21: likely cystitis after tsai catheter. Plan on keflex. 1/13/22: UA dip with blood and leukocytes. Renal cyst ICD-10-CM: N28.1  ICD-9-CM: 753.10  9/21/2021 - Present    Overview Signed 9/21/2021  3:30 PM by Dallas Robles NP     CT 9/9/21: Fluid attenuation exophytic hypodensity in the right kidney, upper pole measuring 2.2 cm x 1.9 cm is consistent with a cyst.   A hyperattenuating cortical focus in the left kidney, upper pole measuring 8 mm is possibly a cyst with proteinaceous or hemorrhagic contents. Chronic kidney disease (CKD), stage IV (severe) ([address] ICD-10-CM: N18.4  ICD-9-CM: 585.4  8/31/2021 - Present    Overview Addendum 1/5/2022 12:38 PM by Dallas Robles NP     8/5/21 creatinine was 5.51    8/31/21: obstructive uropathy (?); she is able to void half of her bladder capacity; advised on timed voiding.     9/3/21 creatinine was 4.03    10/4/21 creatinine was 4.67    11/1/21 creatinine was 4.08               Incomplete emptying of bladder ICD-10-CM: R33.9  ICD-9-CM: 788.21  8/17/2021 - Present    Overview Addendum 1/13/2022 10:22 AM by Jovanny Luna NP     Patient sent to ER by Dr. Sherrie Washington, Nephrology for tsai catheter placement. Per record review, patient was admitted to an outside hospital for renal failure with a creatinine up to 4.8. At that time, her bladder volume was over 600 cc. Dr. Sherrie Washington expressed concern for obstructive uropathy. Creatinine 5.51 on 8/5/21.    8/31/21: tsai catheter. Continue while awaiting further evaluation via cysto, CMG, Uroflow. CT 9/9/21: bilateral renal cysts noted, as well as, a decompressed bladder with Tsai catheter in place. Generalized bladder wall prominence is noted. No intraluminal calculus is seen. 9/23/21: she can void more than half of her bladder capacity. Instructed on timed voiding. Creatinine ranges 4.03 to 5.51.    1/13/22: PVR today 327 mls. No symptoms of infection; no increase urgency, frequency or dysuria. No gross hematuria. 22    Final Diagnoses:   Dehydration [E86.0]  Pleural effusion [J90]  Rectal bleeding [K62.5]  Hard stool [R19.5]    Reason for Hospitalization:  (1) increased troponin s/t ESRD and MI typeII,.     (2) CHF unknown EF: pending ECHO     (3) ESRD on HD: on HD     (4) right sided pleural effusion: s/p IR drainage     (5) Right-sided pleural effusion  -CTA of abdomen and pelvis negative for gastrointestinal hemorrhage does reveal large right-sided pleural effusion containing loculations of air there is atelectasis of the right middle and right lower lobes, small amount of free fluid in the pelvis. IR for thoracenthesis      (6) Anal pain  Rectal bleeding  -Hard stool noted in rectal vault, relieved with enema and manual disimpaction      Hospital Course:     68F, h/o ESRD on HD (recent), CHF unknwon EF, chronic pain s/p intrathecal pump presented with rectal pain (resolved) but found to have significant pleural effusion.       HOSPITAL COURSE: Significant labs on admission hemoglobin is 7.7, sodium 133, chloride 94, CO2 35, anion gap 4, BUN 36, creatinine 2.9, troponin 84 and BNP greater than 35,000. Bilirubin total is 1.1, AST 51 and alkaline phos 199. Chest x-ray reveals elevated right hemidiaphragm with associated right pleural effusion. CTA of abdomen and pelvis negative for gastrointestinal hemorrhage does reveal large right-sided pleural effusion containing loculations of air there is atelectasis of the right middle and right lower lobes, small amount of free fluid in the pelvis. IR for thoracenthesis on Monday. Seen by cardiology- no intervention. On 2/27 underwent IR thoracenthesis. And was planned for discharge            Discharge Medications:   Current Discharge Medication List        CONTINUE these medications which have NOT CHANGED    Details   sevelamer carbonate (RENVELA) 800 mg tab tab Take 1,600 mg by mouth three (3) times daily. carvediloL (COREG) 12.5 mg tablet Take 12.5 mg by mouth two (2) times a day. Follow up Care:    1. Oleg Garcia MD in 1-2 weeks. Please call to set up an appointment shortly after discharge. Diet:  Cardiac Diet    Disposition:  Home. With hh PT OT SN dsease management     Advanced Directive:   FULL x   DNR      Discharge Exam:  Physical Exam:  Physical Exam  Constitutional:       Appearance: She is ill-appearing. Cardiovascular:      Rate and Rhythm: Regular rhythm. Tachycardia present. Pulmonary:      Effort: No respiratory distress. Abdominal:      General: There is no distension. Musculoskeletal:      Right lower leg: No edema. Left lower leg: No edema. Skin:     General: Skin is dry. Neurological:      Motor: Weakness present.     CONSULTATIONS: None    Significant Diagnostic Studies:     Radiologic Studies -   Results from Hospital Encounter encounter on 02/24/23    XR CHEST PORT    Narrative  EXAM:  XR CHEST PORT at 1026 hours    INDICATION: Post thoracentesis    COMPARISON: 1004 hours    TECHNIQUE: portable chest AP view    FINDINGS: The cardiac silhouette is stable. The dual lumen catheter terminates  at the cavoatrial junction. There is no pneumothorax following the thoracentesis. There is persistent right  hemidiaphragm elevation and right basilar atelectasis.     Impression  No pneumothorax following thoracentesis  Persistent right hemidiaphragm elevation and right basilar atelectasis     CT Results  (Last 48 hours)      None                Discharge time spent 35 minutes    Signed:  Paola Hobbs MD  2/27/2023  4:06 PM

## 2023-02-28 NOTE — ED PROVIDER NOTES
Called taken from lab in regards to abnormal fluid culture of the pleural fluid. Pt was dc from floor. Called and notified Halley Been at this time.

## 2023-03-02 LAB
BACTERIA SPEC CULT: NORMAL
GRAM STN SPEC: NORMAL
SPECIAL REQUESTS,SREQ: NORMAL

## 2023-03-04 LAB
BACTERIA SPEC CULT: NORMAL
SPECIAL REQUESTS,SREQ: NORMAL

## 2023-03-06 LAB
BACTERIA ISLT: ABNORMAL
SPECIMEN SOURCE: ABNORMAL

## 2023-03-07 LAB
OTHER ANTIBIOTIC SUSC ISLT: ABNORMAL
SPECIMEN SOURCE: ABNORMAL

## 2023-03-07 NOTE — PROGRESS NOTES
Physician Progress Note      Pascale Crocker  CSN #:                  [id number]  :                       1955  ADMIT DATE:       2023 7:32 AM  DISCH DATE:        2023 5:04 PM  RESPONDING  PROVIDER #:        Kiarra Ritchie MD          QUERY TEXT:    Patient admitted with Pleural Effusion, noted to have CHF and body fluid culture resulted Granulicatella adiacens. If possible, please document further specificity regarding the type/underlying cause of pleural effusion: The medical record reflects the following:  Risk Factors: 69 y/o, CHF, ESRD,    Clinical Indicators:  * IR Procedure Note:  -  Specimens removed: 150cc of thick, malodorous lizz fluid with strands of purulence  -  Post-Procedure Diagnosis: complex right pleural effusion, likely empyema  * DC [place]: large right-sided pleural effusion containing loculations of air  * Culture Result: Moderate Granulicatella adiacens    Treatment: CT Chest, Thoracentesis, pleural fluid culture    Thank you,  Donna HERNANDEZN, RN, CRCR  Please contact me for any questions or concerns regarding this query at [email or web address]  Options provided:  -- Empyema/Bacterial pleural effusion due to Granulicatella adiacens  -- Pleural effusion due to CHF  -- Other - I will add my own diagnosis  -- Disagree - Not applicable / Not valid  -- Disagree - Clinically unable to determine / Unknown  -- Refer to Clinical Documentation Reviewer    PROVIDER RESPONSE TEXT:    Patient has Empyema/Bacterial pleural effusion due to Karine Bishop.     Query created by: Gina Crocker on 3/1/2023 11:33 AM      Electronically signed by:  Kiarra Ritchie MD 3/7/2023 5:12 PM

## 2023-03-17 ENCOUNTER — APPOINTMENT (OUTPATIENT)
Dept: GENERAL RADIOLOGY | Age: 68
DRG: 871 | End: 2023-03-17
Attending: EMERGENCY MEDICINE
Payer: COMMERCIAL

## 2023-03-17 ENCOUNTER — HOSPITAL ENCOUNTER (INPATIENT)
Age: 68
LOS: 1 days | Discharge: ANOTHER HOSPITAL | DRG: 871 | End: 2023-03-19
Attending: EMERGENCY MEDICINE | Admitting: HOSPITALIST
Payer: COMMERCIAL

## 2023-03-17 DIAGNOSIS — Z99.2 ESRD ON DIALYSIS (HCC): ICD-10-CM

## 2023-03-17 DIAGNOSIS — J86.9 EMPYEMA LUNG (HCC): ICD-10-CM

## 2023-03-17 DIAGNOSIS — R78.81 POSITIVE BLOOD CULTURES: ICD-10-CM

## 2023-03-17 DIAGNOSIS — A40.8 SEPSIS DUE TO OTHER STREPTOCOCCUS SPECIES WITHOUT ACUTE ORGAN DYSFUNCTION (HCC): ICD-10-CM

## 2023-03-17 DIAGNOSIS — N18.6 ESRD ON DIALYSIS (HCC): ICD-10-CM

## 2023-03-17 DIAGNOSIS — R06.02 SOB (SHORTNESS OF BREATH): Primary | ICD-10-CM

## 2023-03-17 LAB
ALBUMIN SERPL-MCNC: 2 G/DL (ref 3.5–5)
ALBUMIN/GLOB SERPL: 0.4 (ref 1.1–2.2)
ALP SERPL-CCNC: 145 U/L (ref 45–117)
ALT SERPL-CCNC: 17 U/L (ref 12–78)
ANION GAP SERPL CALC-SCNC: 18 MMOL/L (ref 5–15)
AST SERPL W P-5'-P-CCNC: 39 U/L (ref 15–37)
BASOPHILS # BLD: 0 K/UL (ref 0–0.1)
BASOPHILS NFR BLD: 0 % (ref 0–1)
BILIRUB SERPL-MCNC: 1.1 MG/DL (ref 0.2–1)
BNP SERPL-MCNC: ABNORMAL PG/ML
BUN SERPL-MCNC: 54 MG/DL (ref 6–20)
BUN/CREAT SERPL: 16 (ref 12–20)
CA-I BLD-MCNC: 8.4 MG/DL (ref 8.5–10.1)
CHLORIDE SERPL-SCNC: 95 MMOL/L (ref 97–108)
CO2 SERPL-SCNC: 22 MMOL/L (ref 21–32)
CREAT SERPL-MCNC: 3.41 MG/DL (ref 0.55–1.02)
DIFFERENTIAL METHOD BLD: ABNORMAL
EOSINOPHIL # BLD: 0 K/UL (ref 0–0.4)
EOSINOPHIL NFR BLD: 0 % (ref 0–7)
ERYTHROCYTE [DISTWIDTH] IN BLOOD BY AUTOMATED COUNT: 21.5 % (ref 11.5–14.5)
GLOBULIN SER CALC-MCNC: 5.3 G/DL (ref 2–4)
GLUCOSE BLD STRIP.AUTO-MCNC: 96 MG/DL (ref 65–100)
GLUCOSE SERPL-MCNC: 25 MG/DL (ref 65–100)
HCT VFR BLD AUTO: 19.8 % (ref 35–47)
HGB BLD-MCNC: 6.2 G/DL (ref 11.5–16)
IMM GRANULOCYTES # BLD AUTO: 0 K/UL
IMM GRANULOCYTES NFR BLD AUTO: 0 %
LYMPHOCYTES # BLD: 1.2 K/UL (ref 0.8–3.5)
LYMPHOCYTES NFR BLD: 9 % (ref 12–49)
MAGNESIUM SERPL-MCNC: 2.1 MG/DL (ref 1.6–2.4)
MCH RBC QN AUTO: 27.9 PG (ref 26–34)
MCHC RBC AUTO-ENTMCNC: 31.3 G/DL (ref 30–36.5)
MCV RBC AUTO: 89.2 FL (ref 80–99)
MONOCYTES # BLD: 0.4 K/UL (ref 0–1)
MONOCYTES NFR BLD: 3 % (ref 5–13)
NEUTS BAND NFR BLD MANUAL: 2 % (ref 0–6)
NEUTS SEG # BLD: 11.5 K/UL (ref 1.8–8)
NEUTS SEG NFR BLD: 86 % (ref 32–75)
NRBC # BLD: 0.75 K/UL (ref 0–0.01)
NRBC # BLD: 1.31 K/UL
NRBC BLD MANUAL-RTO: 10 PER 100 WBC
NRBC BLD-RTO: 5.2 PER 100 WBC
PERFORMED BY, TECHID: NORMAL
PLATELET # BLD AUTO: 269 K/UL (ref 150–400)
PMV BLD AUTO: 10.5 FL (ref 8.9–12.9)
POTASSIUM SERPL-SCNC: 5 MMOL/L (ref 3.5–5.1)
PROT SERPL-MCNC: 7.3 G/DL (ref 6.4–8.2)
RBC # BLD AUTO: 2.22 M/UL (ref 3.8–5.2)
RBC MORPH BLD: ABNORMAL
SODIUM SERPL-SCNC: 135 MMOL/L (ref 136–145)
TROPONIN I SERPL HS-MCNC: 90 NG/L (ref 0–51)
WBC # BLD AUTO: 13.1 K/UL (ref 3.6–11)

## 2023-03-17 PROCEDURE — 82962 GLUCOSE BLOOD TEST: CPT

## 2023-03-17 PROCEDURE — 30233N1 TRANSFUSION OF NONAUTOLOGOUS RED BLOOD CELLS INTO PERIPHERAL VEIN, PERCUTANEOUS APPROACH: ICD-10-PCS | Performed by: HOSPITALIST

## 2023-03-17 PROCEDURE — 99285 EMERGENCY DEPT VISIT HI MDM: CPT

## 2023-03-17 PROCEDURE — 74011000250 HC RX REV CODE- 250: Performed by: EMERGENCY MEDICINE

## 2023-03-17 PROCEDURE — 83735 ASSAY OF MAGNESIUM: CPT

## 2023-03-17 PROCEDURE — 96366 THER/PROPH/DIAG IV INF ADDON: CPT

## 2023-03-17 PROCEDURE — 80053 COMPREHEN METABOLIC PANEL: CPT

## 2023-03-17 PROCEDURE — 96365 THER/PROPH/DIAG IV INF INIT: CPT

## 2023-03-17 PROCEDURE — 71045 X-RAY EXAM CHEST 1 VIEW: CPT

## 2023-03-17 PROCEDURE — 84484 ASSAY OF TROPONIN QUANT: CPT

## 2023-03-17 PROCEDURE — 87040 BLOOD CULTURE FOR BACTERIA: CPT

## 2023-03-17 PROCEDURE — 85025 COMPLETE CBC W/AUTO DIFF WBC: CPT

## 2023-03-17 PROCEDURE — 86923 COMPATIBILITY TEST ELECTRIC: CPT

## 2023-03-17 PROCEDURE — 5A1D70Z PERFORMANCE OF URINARY FILTRATION, INTERMITTENT, LESS THAN 6 HOURS PER DAY: ICD-10-PCS | Performed by: INTERNAL MEDICINE

## 2023-03-17 PROCEDURE — P9016 RBC LEUKOCYTES REDUCED: HCPCS

## 2023-03-17 PROCEDURE — 74011250637 HC RX REV CODE- 250/637

## 2023-03-17 PROCEDURE — 36415 COLL VENOUS BLD VENIPUNCTURE: CPT

## 2023-03-17 PROCEDURE — 36430 TRANSFUSION BLD/BLD COMPNT: CPT

## 2023-03-17 PROCEDURE — 83880 ASSAY OF NATRIURETIC PEPTIDE: CPT

## 2023-03-17 PROCEDURE — 86900 BLOOD TYPING SEROLOGIC ABO: CPT

## 2023-03-17 RX ORDER — ACETAMINOPHEN 325 MG/1
650 TABLET ORAL
Status: COMPLETED | OUTPATIENT
Start: 2023-03-17 | End: 2023-03-17

## 2023-03-17 RX ORDER — SODIUM CHLORIDE 9 MG/ML
250 INJECTION, SOLUTION INTRAVENOUS AS NEEDED
Status: DISCONTINUED | OUTPATIENT
Start: 2023-03-17 | End: 2023-03-20 | Stop reason: HOSPADM

## 2023-03-17 RX ORDER — DEXTROSE MONOHYDRATE 100 MG/ML
0-250 INJECTION, SOLUTION INTRAVENOUS AS NEEDED
Status: DISCONTINUED | OUTPATIENT
Start: 2023-03-17 | End: 2023-03-20 | Stop reason: HOSPADM

## 2023-03-17 RX ORDER — ACETAMINOPHEN 325 MG/1
TABLET ORAL
Status: COMPLETED
Start: 2023-03-17 | End: 2023-03-17

## 2023-03-17 RX ORDER — LIDOCAINE 4 G/100G
1 PATCH TOPICAL EVERY 24 HOURS
Status: DISCONTINUED | OUTPATIENT
Start: 2023-03-17 | End: 2023-03-20 | Stop reason: HOSPADM

## 2023-03-17 RX ADMIN — ACETAMINOPHEN 650 MG: 325 TABLET ORAL at 22:52

## 2023-03-17 RX ADMIN — DEXTROSE MONOHYDRATE 250 ML: 100 INJECTION, SOLUTION INTRAVENOUS at 14:00

## 2023-03-17 NOTE — ED NOTES
Bedside shift change report given to [id number] (oncoming nurse) by Emely Cee (offgoing nurse). Report included the following information SBAR, ED Summary, Procedure Summary, MAR and Recent Results, Pending Blood products.

## 2023-03-17 NOTE — ED NOTES
Spoke with patient about blood consent, patient lethargic and answers questions but has to be awoken multiple times when asking questions. She stated yes she would take the blood however goes back to sleep while answering the question. Called and spoke with the son who says she lives at home alone, he is going to call the sister and speak with her also and call back momentarily to make sure they agree about the blood transfusion as they do not think she has ever had one that they are aware of. Son and daughter called back and they are ok with the patient receiving the blood transfusion.

## 2023-03-17 NOTE — ED NOTES
Pt. To dialysis at this time. Pt.  To receive one unit of blood while at dialysis - per dr. Garcia Push

## 2023-03-17 NOTE — PROGRESS NOTES
RENAL Addendum  Patient seen at initiation and during end of dialysis. Now more alert but still exhibits significant debility relative to baseline.  Would recommend admission /observation with usual HD in AM  Glucose monitoring  Begin nutritional support    Delphine Guevara MD

## 2023-03-17 NOTE — CONSULTS
[facility] Renal Consult Note      NAME:  Alisa Grady   :   1955   MRN:  [id number]     Requesting Physician Heidi Clark MD   Reason for Consult:  Dyspnea, CKD 6     PCP:  Yessenia Crane MD     Date/Time:  3/17/2023 3:51 PM          Subjective:     CHIEF COMPLAINT: dyspnea     HISTORY OF PRESENT ILLNESS:     Ms. Braden Howell is a 70 y.o.  female who presents to the ER with dyspnea and found to be hypoglycemic with blood sugar 25. We are asked to evaluate for dialysis. Patient seen in dialysis, not usual self, not talking and agitated when examined. She is not providing a ROS. No increased temperature but WBC 13K. Past Medical History:   Diagnosis Date    Heart failure ([address]     Hypertension     Kidney disease         Past Surgical History:   Procedure Laterality Date    HX KNEE REPLACEMENT Right     HX UROLOGICAL  2021    cystospoy    IR THORACENTESIS NDL PUNC ASP W IMAGE  2023       Social History     Tobacco Use    Smoking status: Former     Years: 10.00     Types: Cigarettes    Smokeless tobacco: Never   Substance Use Topics    Alcohol use: Not Currently        Family History   Problem Relation Age of Onset    Hypertension Mother         No Known Allergies     Prior to Admission medications    Medication Sig Start Date End Date Taking? Authorizing Provider   sevelamer carbonate (RENVELA) 800 mg tab tab Take 1,600 mg by mouth three (3) times daily. Provider, Historical   carvediloL (COREG) 12.5 mg tablet Take 12.5 mg by mouth two (2) times a day.     Provider, Historical         Current Facility-Administered Medications:     dextrose 10% infusion 0-250 mL, 0-250 mL, IntraVENous, PRN, Heidi Clark MD, 250 mL at 23 1400    0.9% sodium chloride infusion 250 mL, 250 mL, IntraVENous, PRN, Heidi Clark MD    Current Outpatient Medications:     sevelamer carbonate (RENVELA) 800 mg tab tab, Take 1,600 mg by mouth three (3) times daily., Disp: , Rfl:     carvediloL (COREG) 12.5 mg tablet, Take 12.5 mg by mouth two (2) times a day., Disp: , Rfl:       Review of Systems:  Review of systems not obtained due to patient factors. Objective:      VITALS:    Vital signs reviewed; most recent are:    Visit Vitals  BP (!) 139/94   Pulse (!) 113   Temp 97.5 °F (36.4 °C)   Resp 23   Ht 4' 11\" (1.499 m)   Wt 58.1 kg (128 lb)   SpO2 98%   BMI 25.85 kg/m²     SpO2 Readings from Last 6 Encounters:   03/17/23 98%   02/27/23 96%   01/13/22 97%   09/23/21 100%   08/31/21 100%   08/05/21 99%        No intake or output data in the 24 hours ending 03/17/23 1551         Exam:   Physical Exam:General appearance: fatigued, uncooperative, no distress, appears older than stated age, cachectic, confused  Head: Normocephalic, without obvious abnormality, atraumatic, temporal wasting  Neck: supple, symmetrical, trachea midline, no adenopathy, thyroid: not enlarged, symmetric, no tenderness/mass/nodules, and no JVD  Lungs: clear to auscultation bilaterally  Heart: regular rate and rhythm, no S3 or S4, no rub  Abdomen: soft, non-tender. Bowel sounds normal. No masses,  no organomegaly  Extremities: edema -1-2+ lower extremity edema  Neurologic: lethargic, agitated when examined, not answering questions     LABS:  Recent Labs     03/17/23  1209   *   K 5.0   CL 95*   CO2 22   BUN 54*   CREA 3.41*   CA 8.4*   ALB 2.0*   MG 2.1   Glucose 25 on ER admit  During HD--blood glucose 96 at 1610hr  Recent Labs     03/17/23  1209   WBC 13.1*   HGB 6.2*   HCT 19.8*        No results found for: GLUCPOC  No results for input(s): PH, PCO2, PO2, HCO3, FIO2 in the last 72 hours. No results for input(s): INR, INREXT in the last 72 hours. No results for input(s): AURORA, KU, CLU, CREAU in the last 72 hours.     No lab exists for component: PROU    CXR 3-17-23  FINDINGS:   There is volume loss in the right lung, the right hemidiaphragm is eventrated or  elevated, and the hepatic flexure is interposed between the diaphragm and the  liver (Chilaiditi variant). Passive atelectasis in the right lung base is  associated. The left lung is clear. The central airways are patent. No  pneumothorax and no large pleural effusion. The heart is enlarged, even given  portable technique. A right IJ hemodialysis catheter terminates in the right  atrium. IMPRESSION  No acute process. Cardiomegaly.   Assessment:   Renal Specific Problems  CKD 6  CAD  AMS  Anemia  SIRS  Dyspnea but without overt volume overload  Hypoglycemia  Hypercalcemia (with corrected calcium)  Hypoalbumin        Plan:     Obtain/ Order: labs/cultures/radiology/procedures:  renal panel, CBC, and blood culture    Fe sat, Hgb A1c  Needs nutritional review: increased protein intake    Therapeutic:    Dialysis 2 hrs with UF 1 liters, 2.0 Ca dialysate  Transfuse 1 unit today with HD  Usual HD in AM    If AMS post HD and blood transfusion then will need Neuro eval    Risk of deterioration: medium      Total time spent with patient: 30 Minutes                  Discussed:   dialysis staff, ER physician           ___________________________________________________    Attending Physician: Chrystal Rangel MD

## 2023-03-17 NOTE — ED PROVIDER NOTES
[address]  EMERGENCY DEPARTMENT HISTORY AND PHYSICAL EXAM      Date: 3/17/2023  Patient Name: Vinicio Geller  MRN: [id number]  [place] 1955  Date of evaluation: 3/17/2023  Provider: Fausto Bamberger, MD   Note Started: 12:28 PM 3/17/23    HISTORY OF PRESENT ILLNESS     Chief Complaint   Patient presents with    Shortness of Breath       History Provided By: Patient    HPI: Vinicio Geller is a 70 y.o. female past medical history significant for end-stage renal disease on Tuesday, Thursday, Saturday dialysis regimen, heart failure and hypertension presents with increasing shortness of breath that began today. She says she missed dialysis yesterday and today has increasing work of breathing. She denies any fever, chills, nausea, vomiting, chest pain, rash, diarrhea, headache, night sweats, loss. Symptoms are mild to moderate at this point time. She is not treated with anything. PAST MEDICAL HISTORY   Past Medical History:  Past Medical History:   Diagnosis Date    Heart failure ([address]     Hypertension     Kidney disease        Past Surgical History:  Past Surgical History:   Procedure Laterality Date    HX KNEE REPLACEMENT Right     HX UROLOGICAL  09/23/2021    cystospoy    IR THORACENTESIS NDL PUNC ASP W IMAGE  2/27/2023       Family History:  Family History   Problem Relation Age of Onset    Hypertension Mother        Social History:  Social History     Tobacco Use    Smoking status: Former     Years: 10.00     Types: Cigarettes    Smokeless tobacco: Never   Vaping Use    Vaping Use: Never used   Substance Use Topics    Alcohol use: Not Currently    Drug use: Never       Allergies:  No Known Allergies    PCP: Suman Charlton MD    Current Meds:   Current Discharge Medication List        CONTINUE these medications which have NOT CHANGED    Details   aspirin delayed-release (Rebecca Low Dose Aspirin) 81 mg tablet Take 81 mg by mouth daily.       atorvastatin (LIPITOR) 20 mg tablet Take 20 mg by mouth daily.      furosemide (LASIX) 80 mg tablet Take 80 mg by mouth two (2) times a day. metOLazone (ZAROXOLYN) 2.5 mg tablet Take 2.5 mg by mouth. docusate sodium (DOK) 250 mg capsule Take 250 mg by mouth daily. rivaroxaban (Xarelto) 20 mg tab tablet Take 20 mg by mouth daily. sevelamer carbonate (RENVELA) 800 mg tab tab Take 1,600 mg by mouth three (3) times daily. carvediloL (COREG) 12.5 mg tablet Take 12.5 mg by mouth two (2) times a day. PHYSICAL EXAM     ED Triage Vitals [03/17/23 1200]   ED Encounter Vitals Group      /80      Pulse (Heart Rate) (!) 107      Resp Rate 20      Temp 97.5 °F (36.4 °C)      Temp src       O2 Sat (%) 98 %      Weight 128 lb      Height 4' 11\"      Physical Exam  Vitals and nursing note reviewed. Constitutional:       General: She is not in acute distress. Appearance: She is well-developed. HENT:      Head: Normocephalic and atraumatic. Nose: Nose normal.      Mouth/Throat:      Mouth: Mucous membranes are moist.      Pharynx: Oropharynx is clear. No oropharyngeal exudate. Eyes:      General:         Right eye: No discharge. Left eye: No discharge. Conjunctiva/sclera: Conjunctivae normal.      Pupils: Pupils are equal, round, and reactive to light. Cardiovascular:      Rate and Rhythm: Regular rhythm. Tachycardia present. Chest Wall: PMI is not displaced. No thrill. Heart sounds: Normal heart sounds. No murmur heard. No friction rub. No gallop. Comments: S3  Pulmonary:      Effort: Pulmonary effort is normal. No respiratory distress. Breath sounds: Normal breath sounds. No wheezing or rales. Chest:      Chest wall: No tenderness. Comments: Dialysis port in right upper chest  Abdominal:      General: Bowel sounds are normal. There is no distension. Palpations: Abdomen is soft. There is no mass. Tenderness: There is no abdominal tenderness. There is no guarding or rebound. Musculoskeletal:         General: Normal range of motion. Cervical back: Normal range of motion and neck supple. Right lower leg: Edema present. Left lower leg: Edema present. Comments: Trace bilateral lower extremity edema   Lymphadenopathy:      Cervical: No cervical adenopathy. Skin:     General: Skin is warm and dry. Capillary Refill: Capillary refill takes less than 2 seconds. Findings: No erythema or rash. Neurological:      Mental Status: She is alert and oriented to person, place, and time. Cranial Nerves: No cranial nerve deficit. Coordination: Coordination normal.   Psychiatric:         Mood and Affect: Mood normal.         Behavior: Behavior normal.         SCREENINGS          No data recorded      End-stage renal disease, volume overload, ACS, arrhythmia, pneumonia, pleural effusion. Will assess with basic cardiac labs EKG and chest x-ray. LAB, EKG AND DIAGNOSTIC RESULTS   Labs:  Recent Results (from the past 12 hour(s))   METABOLIC PANEL, COMPREHENSIVE    Collection Time: 03/19/23  3:30 AM   Result Value Ref Range    Sodium 131 (L) 136 - 145 mmol/L    Potassium 3.8 3.5 - 5.1 mmol/L    Chloride 95 (L) 97 - 108 mmol/L    CO2 29 21 - 32 mmol/L    Anion gap 7 5 - 15 mmol/L    Glucose 91 65 - 100 mg/dL    BUN 60 (H) 6 - 20 mg/dL    Creatinine 3.25 (H) 0.55 - 1.02 mg/dL    BUN/Creatinine ratio 18 12 - 20      eGFR 15 (L) >60 ml/min/1.73m2    Calcium 8.0 (L) 8.5 - 10.1 mg/dL    Bilirubin, total 0.8 0.2 - 1.0 mg/dL    AST (SGOT) 29 15 - 37 U/L    ALT (SGPT) 15 12 - 78 U/L    Alk.  phosphatase 120 (H) 45 - 117 U/L    Protein, total 6.4 6.4 - 8.2 g/dL    Albumin 1.8 (L) 3.5 - 5.0 g/dL    Globulin 4.6 (H) 2.0 - 4.0 g/dL    A-G Ratio 0.4 (L) 1.1 - 2.2     CBC WITH AUTOMATED DIFF    Collection Time: 03/19/23  3:30 AM   Result Value Ref Range    WBC 11.1 (H) 3.6 - 11.0 K/uL    RBC 2.68 (L) 3.80 - 5.20 M/uL    HGB 7.7 (L) 11.5 - 16.0 g/dL    HCT 22.5 (L) 35.0 - 47.0 % MCV 84.0 80.0 - 99.0 FL    MCH 28.7 26.0 - 34.0 PG    MCHC 34.2 30.0 - 36.5 g/dL    RDW 18.2 (H) 11.5 - 14.5 %    PLATELET 165 158 - 545 K/uL    MPV 10.4 8.9 - 12.9 FL    NRBC 7.3 (H) 0.0  WBC    ABSOLUTE NRBC 0.81 (H) 0.00 - 0.01 K/uL    NEUTROPHILS 83 (H) 32 - 75 %    LYMPHOCYTES 11 (L) 12 - 49 %    MONOCYTES 5 5 - 13 %    EOSINOPHILS 0 0 - 7 %    BASOPHILS 0 0 - 1 %    IMMATURE GRANULOCYTES 1 (H) 0 - 0.5 %    ABS. NEUTROPHILS 9.2 (H) 1.8 - 8.0 K/UL    ABS. LYMPHOCYTES 1.2 0.8 - 3.5 K/UL    ABS. MONOCYTES 0.6 0.0 - 1.0 K/UL    ABS. EOSINOPHILS 0.0 0.0 - 0.4 K/UL    ABS. BASOPHILS 0.0 0.0 - 0.1 K/UL    ABS. IMM. GRANS. 0.1 (H) 0.00 - 0.04 K/UL    DF AUTOMATED      RBC COMMENTS Polychromasia  1+        RBC COMMENTS Target Cells  2+        RBC COMMENTS Anisocytosis  1+        RBC COMMENTS Macrocytosis  1+       VANCOMYCIN, TROUGH    Collection Time: 03/19/23  3:30 AM   Result Value Ref Range    Vancomycin,trough 19.5 (H) 5.0 - 10.0 ug/mL   C REACTIVE PROTEIN, QT    Collection Time: 03/19/23  3:30 AM   Result Value Ref Range    C-Reactive protein 6.02 (H) 0.00 - 0.60 mg/dL   PROCALCITONIN    Collection Time: 03/19/23  3:30 AM   Result Value Ref Range    Procalcitonin 8.69 (H) 0 ng/mL   GLUCOSE, POC    Collection Time: 03/19/23  8:16 AM   Result Value Ref Range    Glucose (POC) 89 65 - 100 mg/dL    Performed by [address] [place] [address], POC    Collection Time: 03/19/23 11:44 AM   Result Value Ref Range    Glucose (POC) 120 (H) 65 - 100 mg/dL    Performed by Jessika Blankenship          Radiologic Studies:  Non-plain film images such as CT, Ultrasound and MRI are read by the radiologist. Plain radiographic images are visualized and preliminarily interpreted by the ED Provider with the following findings: No appreciable acute infiltrate on my independent evaluation of the chest x-ray not Applicable    Interpretation per the Radiologist below, if available at the time of this note:  No results found. PROCEDURES   Unless otherwise noted below, none  Performed by: Myriam Thompson MD   Procedures      CRITICAL CARE TIME   CRITICAL CARE NOTE :  IMPENDING DETERIORATION -Metabolic and Renal  ASSOCIATED RISK FACTORS - Hypoxia  MANAGEMENT- Bedside Assessment and Supervision of Care  INTERPRETATION -  Xrays and metabolic labs  INTERVENTIONS -dialysis  CASE REVIEW - Hospitalist/Intensivist  TREATMENT RESPONSE -Stable  PERFORMED BY - Self    NOTES   :  I have spent 45 minutes of critical care time involved in lab review, consultations with specialist, family decision- making, bedside attention and documentation. This time excludes time spent in any separate billed procedures. During this entire length of time I was immediately available to the patient . Myriam Thompson MD     EMERGENCY DEPARTMENT COURSE and DIFFERENTIAL DIAGNOSIS/MDM   CC/HPI Summary, DDx, ED Course, and Reassessment: 68-year-old female presenting with shortness of breath. She has a history of end-stage renal disease and after dialysis maintained some mild tachycardia as well as some mild hypotension. Shortness of breath improved but did not completely resolved subsequently did not receive transfusion in dialysis and subsequently was admitted to the hospital.    Disposition Considerations (Tests not done, Shared Decision Making, Pt Expectation of Test or Treatment.):  No evidence of acute blood loss anemia likely secondary to acute chronic anemia renal disease. Records Reviewed (source and summary of external notes): Prior medical records and Nursing notes    Vitals:    Vitals:    03/19/23 1100 03/19/23 1115 03/19/23 1200 03/19/23 1221   BP: (!) 74/55 (!) 77/55 (!) 74/57 (!) 74/57   Pulse: 69 69 75 71   Resp: 17 16 24    Temp: (!) 95.7 °F (35.4 °C)      SpO2: 98% 99% 97%    Weight:       Height:            ED Course as of 03/19/23 1417   Fri Mar 17, 2023   1509 Pina the case with Dr. Benjamin Riddle and the patient will need to be dialyzed. We will add 1 unit of packed red blood cells as her hemoglobin is now 6.2. [CS]   2216 Patient returned from dialysis but had not gotten her blood transfusion. Nurse was concerned that she was a little lethargic however after getting half of the blood, patient is alert sitting at the edge of the bed. [JS]      ED Course User Index  [CS] Syd Bowen MD  [JS] Lico Tidwell MD       Patient was given the following medications:  Medications   dextrose 10% infusion 0-250 mL (0 mL IntraVENous IV Completed 3/18/23 0424)   0.9% sodium chloride infusion 250 mL (has no administration in time range)   lidocaine 4 % patch 1 Patch (1 Patch TransDERmal Apply Patch 3/19/23 1222)   carvediloL (COREG) tablet 12.5 mg (12.5 mg Oral Given 3/19/23 0817)   sodium chloride (NS) flush 5-40 mL (10 mL IntraVENous Given 3/19/23 0521)   sodium chloride (NS) flush 5-40 mL (has no administration in time range)   acetaminophen (TYLENOL) tablet 650 mg (650 mg Oral Given 3/19/23 0817)     Or   acetaminophen (TYLENOL) suppository 650 mg ( Rectal See Alternative 3/19/23 0817)   ondansetron (ZOFRAN ODT) tablet 4 mg (has no administration in time range)     Or   ondansetron (ZOFRAN) injection 4 mg (has no administration in time range)   sodium chloride (NS) flush 5-10 mL (has no administration in time range)   docusate (COLACE) 50 mg/5 mL oral liquid 250 mg (250 mg Oral Given 3/19/23 0816)   Vancomycin Pharmacy Dosing (has no administration in time range)   0.9% sodium chloride infusion 250 mL (has no administration in time range)   dronabinoL (MARINOL) capsule 10 mg (10 mg Oral Given 3/19/23 0817)   0.9% sodium chloride infusion 250 mL (has no administration in time range)   dextrose 5% and 0.9% NaCl infusion (75 mL/hr IntraVENous New Bag 3/19/23 1228)   Vancomycin - Please draw a radnom level on 3/20 at 0400.  Thanks! (has no administration in time range)   albumin human 25% (BUMINATE) solution 12.5 g (12.5 g IntraVENous New Bag 3/19/23 1221)   midodrine (PROAMATINE) tablet 10 mg (10 mg Oral Given 3/19/23 1221)   epoetin erich-epbx (RETACRIT) injection 6,000 Units (has no administration in time range)   acetaminophen (TYLENOL) tablet 650 mg (650 mg Oral Given 3/17/23 2252)   HYDROcodone-acetaminophen (NORCO) 5-325 mg per tablet 1 Tablet (1 Tablet Oral Given 3/18/23 0107)   vancomycin (VANCOCIN) 1,000 mg in 0.9% sodium chloride 250 mL (Onlv4Dse) (0 mg IntraVENous IV Completed 3/18/23 0414)   sodium chloride 0.9 % bolus infusion 250 mL (0 mL IntraVENous IV Completed 3/18/23 0414)   cefepime (MAXIPIME) 1 g in 0.9% sodium chloride (MBP/ADV) 50 mL MBP (1 g IntraVENous New Bag 3/18/23 0415)   dextrose 10% infusion 250 mL (250 mL IntraVENous New Bag 3/18/23 0454)   epoetin erich-epbx (RETACRIT) injection 10,000 Units (10,000 Units SubCUTAneous Given 3/18/23 1822)   vancomycin (VANCOCIN) 500 mg in 0.9% sodium chloride (MBP/ADV) 100 mL MBP (500 mg IntraVENous New Bag 3/18/23 1822)   VANCOMYCIN Draw random at 0400 on 3/19/23 ( Other Given 3/19/23 0400)       CONSULTS: (Who and What was discussed)  IP CONSULT TO INFECTIOUS DISEASES  IP CONSULT TO NEPHROLOGY  IP CONSULT TO GENERAL SURGERY     Social Determinants affecting Dx or Tx: Patient lacks support at home or lives alone. ED FINAL IMPRESSION     1. SOB (shortness of breath)    2. ESRD on dialysis [facility])          DISPOSITION/PLAN   Admitted    Admit Note: Pt is being admitted by hospitalist. The results of their tests and reason(s) for their admission have been discussed with pt and/or available family. They convey agreement and understanding for the need to be admitted and for the admission diagnosis.      PATIENT REFERRED TO:  Follow-up Information       Follow up With Specialties Details Why Contact Info    Veronica Bermudez MD Nephrology Schedule an appointment as soon as possible for a visit   [address] [place] [address]  [phone]                DISCHARGE MEDICATIONS:  Current Discharge Medication List            DISCONTINUED MEDICATIONS:  Current Discharge Medication List          I am the Primary Clinician of Record. Ayse Mayfield MD (electronically signed)    (Please note that parts of this dictation were completed with voice recognition software. Quite often unanticipated grammatical, syntax, homophones, and other interpretive errors are inadvertently transcribed by the computer software. Please disregards these errors.  Please excuse any errors that have escaped final proofreading.)

## 2023-03-17 NOTE — Clinical Note
Status[de-identified] INPATIENT [101]   Type of Bed: Remote Telemetry [29]   Cardiac Monitoring Required?: Yes   Inpatient Hospitalization Certified Necessary for the Following Reasons: 3. Patient receiving treatment that can only be provided in an inpatient setting (further clarification in H&P documentation)   Admitting Diagnosis: Sinus tachycardia [201747]   Admitting Diagnosis: Anemia [863747]   Admitting Diagnosis: Altered mental status [780.97. ICD-9-CM]   Admitting Physician: Ike Borrego [[id number]]   Attending Physician: Ike Borrego [[id number]]   Estimated Length of Stay: 2 Midnights   Discharge Plan[de-identified] [facility]

## 2023-03-18 ENCOUNTER — APPOINTMENT (OUTPATIENT)
Dept: CT IMAGING | Age: 68
DRG: 871 | End: 2023-03-18
Attending: HOSPITALIST
Payer: COMMERCIAL

## 2023-03-18 PROBLEM — R41.82 ALTERED MENTAL STATUS: Status: ACTIVE | Noted: 2023-03-18

## 2023-03-18 PROBLEM — D64.9 ANEMIA: Status: ACTIVE | Noted: 2023-03-18

## 2023-03-18 PROBLEM — R00.0 SINUS TACHYCARDIA: Status: ACTIVE | Noted: 2023-03-18

## 2023-03-18 PROBLEM — R00.0 TACHYCARDIA: Status: ACTIVE | Noted: 2023-03-18

## 2023-03-18 LAB
ALBUMIN SERPL-MCNC: 2.1 G/DL (ref 3.5–5)
ANION GAP SERPL CALC-SCNC: 10 MMOL/L (ref 5–15)
BASOPHILS # BLD: 0 K/UL (ref 0–0.1)
BASOPHILS NFR BLD: 0 % (ref 0–1)
BUN SERPL-MCNC: 47 MG/DL (ref 6–20)
BUN/CREAT SERPL: 18 (ref 12–20)
CA-I BLD-MCNC: 8.3 MG/DL (ref 8.5–10.1)
CA-I BLD-MCNC: 8.3 MG/DL (ref 8.5–10.1)
CHLORIDE SERPL-SCNC: 97 MMOL/L (ref 97–108)
CO2 SERPL-SCNC: 27 MMOL/L (ref 21–32)
CREAT SERPL-MCNC: 2.68 MG/DL (ref 0.55–1.02)
CRP SERPL-MCNC: 9.22 MG/DL (ref 0–0.6)
DIFFERENTIAL METHOD BLD: ABNORMAL
EOSINOPHIL # BLD: 0 K/UL (ref 0–0.4)
EOSINOPHIL NFR BLD: 0 % (ref 0–7)
ERYTHROCYTE [DISTWIDTH] IN BLOOD BY AUTOMATED COUNT: 19.2 % (ref 11.5–14.5)
ERYTHROCYTE [SEDIMENTATION RATE] IN BLOOD: 62 MM/HR (ref 0–30)
FLUAV AG NPH QL IA: NEGATIVE
FLUBV AG NOSE QL IA: NEGATIVE
GLUCOSE BLD STRIP.AUTO-MCNC: 150 MG/DL (ref 65–100)
GLUCOSE BLD STRIP.AUTO-MCNC: 154 MG/DL (ref 65–100)
GLUCOSE BLD STRIP.AUTO-MCNC: 46 MG/DL (ref 65–100)
GLUCOSE BLD STRIP.AUTO-MCNC: 56 MG/DL (ref 65–100)
GLUCOSE BLD STRIP.AUTO-MCNC: 56 MG/DL (ref 65–100)
GLUCOSE BLD STRIP.AUTO-MCNC: 70 MG/DL (ref 65–100)
GLUCOSE BLD STRIP.AUTO-MCNC: 74 MG/DL (ref 65–100)
GLUCOSE BLD STRIP.AUTO-MCNC: 77 MG/DL (ref 65–100)
GLUCOSE SERPL-MCNC: 63 MG/DL (ref 65–100)
HCT VFR BLD AUTO: 19.7 % (ref 35–47)
HCT VFR BLD AUTO: 20.5 % (ref 35–47)
HCT VFR BLD AUTO: 20.6 % (ref 35–47)
HGB BLD-MCNC: 6.7 G/DL (ref 11.5–16)
IMM GRANULOCYTES # BLD AUTO: 0 K/UL
IMM GRANULOCYTES NFR BLD AUTO: 0 %
LACTATE BLD-SCNC: 5.48 MMOL/L (ref 0.4–2)
LACTATE SERPL-SCNC: 5.5 MMOL/L (ref 0.4–2)
LYMPHOCYTES # BLD: 1.1 K/UL (ref 0.8–3.5)
LYMPHOCYTES NFR BLD: 9 % (ref 12–49)
MCH RBC QN AUTO: 28.3 PG (ref 26–34)
MCHC RBC AUTO-ENTMCNC: 32.7 G/DL (ref 30–36.5)
MCV RBC AUTO: 86.5 FL (ref 80–99)
MONOCYTES # BLD: 0.8 K/UL (ref 0–1)
MONOCYTES NFR BLD: 6 % (ref 5–13)
MRSA DNA SPEC QL NAA+PROBE: DETECTED
NEUTS SEG # BLD: 10.8 K/UL (ref 1.8–8)
NEUTS SEG NFR BLD: 85 % (ref 32–75)
NRBC # BLD: 0.86 K/UL (ref 0–0.01)
NRBC BLD-RTO: 6.8 PER 100 WBC
PERFORMED BY, TECHID: ABNORMAL
PERFORMED BY, TECHID: NORMAL
PHOSPHATE SERPL-MCNC: 4 MG/DL (ref 2.6–4.7)
PLATELET # BLD AUTO: 205 K/UL (ref 150–400)
PMV BLD AUTO: 10.2 FL (ref 8.9–12.9)
POTASSIUM SERPL-SCNC: 4.2 MMOL/L (ref 3.5–5.1)
PROCALCITONIN SERPL-MCNC: 8.31 NG/ML
PTH-INTACT SERPL-MCNC: 207.6 PG/ML (ref 18.4–88)
RBC # BLD AUTO: 2.37 M/UL (ref 3.8–5.2)
RBC MORPH BLD: ABNORMAL
SARS-COV-2 RDRP RESP QL NAA+PROBE: NOT DETECTED
SERVICE CMNT-IMP: ABNORMAL
SODIUM SERPL-SCNC: 134 MMOL/L (ref 136–145)
SPECIMEN SITE: ABNORMAL
TSH SERPL DL<=0.05 MIU/L-ACNC: 0.45 UIU/ML (ref 0.36–3.74)
VANCOMYCIN TROUGH SERPL-MCNC: 12.6 UG/ML (ref 5–10)
WBC # BLD AUTO: 12.7 K/UL (ref 3.6–11)

## 2023-03-18 PROCEDURE — 96367 TX/PROPH/DG ADDL SEQ IV INF: CPT

## 2023-03-18 PROCEDURE — 74011000258 HC RX REV CODE- 258: Performed by: STUDENT IN AN ORGANIZED HEALTH CARE EDUCATION/TRAINING PROGRAM

## 2023-03-18 PROCEDURE — 87077 CULTURE AEROBIC IDENTIFY: CPT

## 2023-03-18 PROCEDURE — P9016 RBC LEUKOCYTES REDUCED: HCPCS

## 2023-03-18 PROCEDURE — 87641 MR-STAPH DNA AMP PROBE: CPT

## 2023-03-18 PROCEDURE — 65610000006 HC RM INTENSIVE CARE

## 2023-03-18 PROCEDURE — 82962 GLUCOSE BLOOD TEST: CPT

## 2023-03-18 PROCEDURE — 87040 BLOOD CULTURE FOR BACTERIA: CPT

## 2023-03-18 PROCEDURE — 99223 1ST HOSP IP/OBS HIGH 75: CPT | Performed by: INTERNAL MEDICINE

## 2023-03-18 PROCEDURE — 74011000250 HC RX REV CODE- 250: Performed by: HOSPITALIST

## 2023-03-18 PROCEDURE — 74011250636 HC RX REV CODE- 250/636: Performed by: NURSE PRACTITIONER

## 2023-03-18 PROCEDURE — 84145 PROCALCITONIN (PCT): CPT

## 2023-03-18 PROCEDURE — 84443 ASSAY THYROID STIM HORMONE: CPT

## 2023-03-18 PROCEDURE — 74011250636 HC RX REV CODE- 250/636: Performed by: INTERNAL MEDICINE

## 2023-03-18 PROCEDURE — 87150 DNA/RNA AMPLIFIED PROBE: CPT

## 2023-03-18 PROCEDURE — 30233N1 TRANSFUSION OF NONAUTOLOGOUS RED BLOOD CELLS INTO PERIPHERAL VEIN, PERCUTANEOUS APPROACH: ICD-10-PCS | Performed by: HOSPITALIST

## 2023-03-18 PROCEDURE — 74011250637 HC RX REV CODE- 250/637: Performed by: HOSPITALIST

## 2023-03-18 PROCEDURE — 85014 HEMATOCRIT: CPT

## 2023-03-18 PROCEDURE — 83970 ASSAY OF PARATHORMONE: CPT

## 2023-03-18 PROCEDURE — 83605 ASSAY OF LACTIC ACID: CPT

## 2023-03-18 PROCEDURE — 71250 CT THORAX DX C-: CPT

## 2023-03-18 PROCEDURE — 87186 SC STD MICRODIL/AGAR DIL: CPT

## 2023-03-18 PROCEDURE — 80047 BASIC METABLC PNL IONIZED CA: CPT

## 2023-03-18 PROCEDURE — 87804 INFLUENZA ASSAY W/OPTIC: CPT

## 2023-03-18 PROCEDURE — 74011250637 HC RX REV CODE- 250/637: Performed by: STUDENT IN AN ORGANIZED HEALTH CARE EDUCATION/TRAINING PROGRAM

## 2023-03-18 PROCEDURE — 74011000258 HC RX REV CODE- 258: Performed by: HOSPITALIST

## 2023-03-18 PROCEDURE — 74011000250 HC RX REV CODE- 250: Performed by: STUDENT IN AN ORGANIZED HEALTH CARE EDUCATION/TRAINING PROGRAM

## 2023-03-18 PROCEDURE — 85018 HEMOGLOBIN: CPT

## 2023-03-18 PROCEDURE — 85025 COMPLETE CBC W/AUTO DIFF WBC: CPT

## 2023-03-18 PROCEDURE — 74011250636 HC RX REV CODE- 250/636: Performed by: HOSPITALIST

## 2023-03-18 PROCEDURE — 87635 SARS-COV-2 COVID-19 AMP PRB: CPT

## 2023-03-18 PROCEDURE — 74011000250 HC RX REV CODE- 250: Performed by: EMERGENCY MEDICINE

## 2023-03-18 PROCEDURE — 80069 RENAL FUNCTION PANEL: CPT

## 2023-03-18 PROCEDURE — 74011250636 HC RX REV CODE- 250/636: Performed by: STUDENT IN AN ORGANIZED HEALTH CARE EDUCATION/TRAINING PROGRAM

## 2023-03-18 PROCEDURE — 86140 C-REACTIVE PROTEIN: CPT

## 2023-03-18 PROCEDURE — 36430 TRANSFUSION BLD/BLD COMPNT: CPT

## 2023-03-18 PROCEDURE — 80202 ASSAY OF VANCOMYCIN: CPT

## 2023-03-18 PROCEDURE — 85652 RBC SED RATE AUTOMATED: CPT

## 2023-03-18 RX ORDER — SODIUM CHLORIDE 0.9 % (FLUSH) 0.9 %
5-40 SYRINGE (ML) INJECTION AS NEEDED
Status: DISCONTINUED | OUTPATIENT
Start: 2023-03-18 | End: 2023-03-20 | Stop reason: HOSPADM

## 2023-03-18 RX ORDER — DEXTROSE MONOHYDRATE 100 MG/ML
250 INJECTION, SOLUTION INTRAVENOUS ONCE
Status: COMPLETED | OUTPATIENT
Start: 2023-03-18 | End: 2023-03-18

## 2023-03-18 RX ORDER — ONDANSETRON 4 MG/1
4 TABLET, ORALLY DISINTEGRATING ORAL
Status: DISCONTINUED | OUTPATIENT
Start: 2023-03-18 | End: 2023-03-20 | Stop reason: HOSPADM

## 2023-03-18 RX ORDER — SODIUM CHLORIDE 0.9 % (FLUSH) 0.9 %
5-10 SYRINGE (ML) INJECTION AS NEEDED
Status: DISCONTINUED | OUTPATIENT
Start: 2023-03-18 | End: 2023-03-20 | Stop reason: HOSPADM

## 2023-03-18 RX ORDER — DOCUSATE SODIUM 50 MG/5ML
250 LIQUID ORAL DAILY
Status: DISCONTINUED | OUTPATIENT
Start: 2023-03-18 | End: 2023-03-20 | Stop reason: HOSPADM

## 2023-03-18 RX ORDER — DEXTROSE MONOHYDRATE 50 MG/ML
50 INJECTION, SOLUTION INTRAVENOUS CONTINUOUS
Status: DISCONTINUED | OUTPATIENT
Start: 2023-03-18 | End: 2023-03-19

## 2023-03-18 RX ORDER — DRONABINOL 2.5 MG/1
10 CAPSULE ORAL 2 TIMES DAILY
Status: DISCONTINUED | OUTPATIENT
Start: 2023-03-18 | End: 2023-03-20 | Stop reason: HOSPADM

## 2023-03-18 RX ORDER — CARVEDILOL 12.5 MG/1
12.5 TABLET ORAL 2 TIMES DAILY
Status: DISCONTINUED | OUTPATIENT
Start: 2023-03-18 | End: 2023-03-20 | Stop reason: HOSPADM

## 2023-03-18 RX ORDER — SODIUM CHLORIDE 0.9 % (FLUSH) 0.9 %
5-40 SYRINGE (ML) INJECTION EVERY 8 HOURS
Status: DISCONTINUED | OUTPATIENT
Start: 2023-03-18 | End: 2023-03-20 | Stop reason: HOSPADM

## 2023-03-18 RX ORDER — ASPIRIN 81 MG/1
81 TABLET ORAL DAILY
COMMUNITY

## 2023-03-18 RX ORDER — HYDROCODONE BITARTRATE AND ACETAMINOPHEN 5; 325 MG/1; MG/1
1 TABLET ORAL
Status: COMPLETED | OUTPATIENT
Start: 2023-03-18 | End: 2023-03-18

## 2023-03-18 RX ORDER — SODIUM CHLORIDE 9 MG/ML
250 INJECTION, SOLUTION INTRAVENOUS AS NEEDED
Status: DISCONTINUED | OUTPATIENT
Start: 2023-03-18 | End: 2023-03-20 | Stop reason: HOSPADM

## 2023-03-18 RX ORDER — ATORVASTATIN CALCIUM 20 MG/1
20 TABLET, FILM COATED ORAL DAILY
COMMUNITY

## 2023-03-18 RX ORDER — ACETAMINOPHEN 325 MG/1
650 TABLET ORAL
Status: DISCONTINUED | OUTPATIENT
Start: 2023-03-18 | End: 2023-03-20 | Stop reason: HOSPADM

## 2023-03-18 RX ORDER — FUROSEMIDE 80 MG/1
80 TABLET ORAL 2 TIMES DAILY
COMMUNITY
Start: 2023-03-03

## 2023-03-18 RX ORDER — DOCUSATE SODIUM 250 MG
250 CAPSULE ORAL DAILY
COMMUNITY
Start: 2023-03-03

## 2023-03-18 RX ORDER — ONDANSETRON 2 MG/ML
4 INJECTION INTRAMUSCULAR; INTRAVENOUS
Status: DISCONTINUED | OUTPATIENT
Start: 2023-03-18 | End: 2023-03-20 | Stop reason: HOSPADM

## 2023-03-18 RX ORDER — METOLAZONE 2.5 MG/1
2.5 TABLET ORAL
COMMUNITY
Start: 2022-12-21

## 2023-03-18 RX ORDER — ACETAMINOPHEN 650 MG/1
650 SUPPOSITORY RECTAL
Status: DISCONTINUED | OUTPATIENT
Start: 2023-03-18 | End: 2023-03-20 | Stop reason: HOSPADM

## 2023-03-18 RX ADMIN — SODIUM CHLORIDE, PRESERVATIVE FREE 10 ML: 5 INJECTION INTRAVENOUS at 10:01

## 2023-03-18 RX ADMIN — EPOETIN ALFA-EPBX 10000 UNITS: 10000 INJECTION, SOLUTION INTRAVENOUS; SUBCUTANEOUS at 18:22

## 2023-03-18 RX ADMIN — DOCUSATE SODIUM 250 MG: 50 LIQUID ORAL at 09:53

## 2023-03-18 RX ADMIN — SODIUM CHLORIDE, PRESERVATIVE FREE 10 ML: 5 INJECTION INTRAVENOUS at 21:02

## 2023-03-18 RX ADMIN — CEFEPIME HYDROCHLORIDE 1 G: 1 INJECTION, POWDER, FOR SOLUTION INTRAMUSCULAR; INTRAVENOUS at 04:15

## 2023-03-18 RX ADMIN — CARVEDILOL 12.5 MG: 12.5 TABLET, FILM COATED ORAL at 20:53

## 2023-03-18 RX ADMIN — DEXTROSE MONOHYDRATE 250 ML: 100 INJECTION, SOLUTION INTRAVENOUS at 04:54

## 2023-03-18 RX ADMIN — VANCOMYCIN HYDROCHLORIDE 1000 MG: 1 INJECTION, POWDER, LYOPHILIZED, FOR SOLUTION INTRAVENOUS at 02:58

## 2023-03-18 RX ADMIN — SODIUM CHLORIDE, PRESERVATIVE FREE 10 ML: 5 INJECTION INTRAVENOUS at 14:55

## 2023-03-18 RX ADMIN — VANCOMYCIN HYDROCHLORIDE 500 MG: 500 INJECTION, POWDER, LYOPHILIZED, FOR SOLUTION INTRAVENOUS at 18:22

## 2023-03-18 RX ADMIN — DRONABINOL 10 MG: 2.5 CAPSULE ORAL at 20:53

## 2023-03-18 RX ADMIN — CARVEDILOL 12.5 MG: 12.5 TABLET, FILM COATED ORAL at 09:53

## 2023-03-18 RX ADMIN — DEXTROSE MONOHYDRATE 50 ML/HR: 50 INJECTION, SOLUTION INTRAVENOUS at 11:46

## 2023-03-18 RX ADMIN — HYDROCODONE BITARTRATE AND ACETAMINOPHEN 1 TABLET: 5; 325 TABLET ORAL at 01:07

## 2023-03-18 RX ADMIN — DEXTROSE MONOHYDRATE 250 ML: 100 INJECTION, SOLUTION INTRAVENOUS at 03:37

## 2023-03-18 RX ADMIN — SODIUM CHLORIDE 250 ML: 9 INJECTION, SOLUTION INTRAVENOUS at 02:58

## 2023-03-18 NOTE — CONSULTS
Consult Date: 3/18/2023    Consults  Empyema    Subjective   This is a 70year old female with ESRD on hemodialysis who was hospitalized last [facility] at which time she had a large right pleural effusion. She underwent thoracentesis with pleural fluid showing Granulicatella adiacens and Streptococcus mitis and oralis. It appears that patient was discharged before these results were available. I was unable to identify any antibiotics at discharge. She presented to the ED this time with SOB after missing session. Found to be hypoglycemic. She was afebrile but WBC was 13,100 with elevated lactic acid, CRP and procal. CXR reported as showing no acute process but was markedly abnormal with volume loss right lung. CT Chest showed moderate right pleural fluid collection with air-fluid level and numerousnondependent air bubbles compatible with empyema. Images reviewed by me. Blood cultures were sent and patient was started on Cefepime and Vancomycin. ID has been consulted for this reason. Patient seen in the ICU where she is remarkably stable with mild to moderate right chest discomfort but no dyspnea. She is concerned that no one told her about this when she was recently hospitalized.      Past Medical History:   Diagnosis Date    Heart failure ([address]     Hypertension     Kidney disease       Past Surgical History:   Procedure Laterality Date    HX KNEE REPLACEMENT Right     HX UROLOGICAL  09/23/2021    cystospoy    IR THORACENTESIS NDL PUNC ASP W IMAGE  2/27/2023     Family History   Problem Relation Age of Onset    Hypertension Mother       Social History     Tobacco Use    Smoking status: Former     Years: 10.00     Types: Cigarettes    Smokeless tobacco: Never   Substance Use Topics    Alcohol use: Not Currently       Current Facility-Administered Medications   Medication Dose Route Frequency Provider Last Rate Last Admin    carvediloL (COREG) tablet 12.5 mg  12.5 mg Oral BID Paulo Barthel, MD sodium chloride (NS) flush 5-40 mL  5-40 mL IntraVENous Q8H Radha Valencia MD        sodium chloride (NS) flush 5-40 mL  5-40 mL IntraVENous PRN Radha Valencia MD        acetaminophen (TYLENOL) tablet 650 mg  650 mg Oral Q6H PRN Radha Valencia MD        Or    acetaminophen (TYLENOL) suppository 650 mg  650 mg Rectal Q6H PRN Radha Valencia MD        ondansetron (ZOFRAN ODT) tablet 4 mg  4 mg Oral Q6H PRN Radha Valencia MD        Or    ondansetron (ZOFRAN) injection 4 mg  4 mg IntraVENous Q6H PRN Radha Valencia MD        sodium chloride (NS) flush 5-10 mL  5-10 mL IntraVENous PRN Kamari Bonilla MD        docusate (COLACE) 50 mg/5 mL oral liquid 250 mg  250 mg Oral DAILY Radha Valencia MD        [START ON 3/19/2023] cefepime (MAXIPIME) 1 g in 0.9% sodium chloride (MBP/ADV) 50 mL MBP  1 g IntraVENous Q24H Radha Valencia MD        Vancomycin Pharmacy Dosing   Other Rx Dosing/Monitoring Radha Valencia MD        dextrose 10% infusion 0-250 mL  0-250 mL IntraVENous PRN Maribeth Lopez MD   IV Completed at 03/18/23 0424    0.9% sodium chloride infusion 250 mL  250 mL IntraVENous PRN Maribeth Lopez MD        lidocaine 4 % patch 1 Patch  1 Patch TransDERmal Q24H Brian Peterson MD   1 Patch at 03/17/23 2248        Review of Systems   Constitutional:  Negative for chills and fever. HENT: Negative. Eyes: Negative. Respiratory:  Positive for shortness of breath. Negative for cough and wheezing. Cardiovascular: Negative. Gastrointestinal: Negative. Endocrine: Negative. Genitourinary: Negative. Musculoskeletal: Negative. Allergic/Immunologic: Negative. Neurological: Negative. Hematological: Negative. Psychiatric/Behavioral: Negative.        Objective     Vital signs for last 24 hours:  Visit Vitals  BP 95/70   Pulse 96   Temp (P) 97.7 °F (36.5 °C)   Resp 16   Ht 4' 11\" (1.499 m)   Wt 128 lb (58.1 kg)   SpO2 94% BMI 25.85 kg/m²       Intake/Output this shift:  Current Shift: No intake/output data recorded. Last 3 Shifts: 03/16 1901 - 03/18 0700  In: 1025 [I.V.:750]  Out: -     Data Review:   Recent Results (from the past 24 hour(s))   CBC WITH AUTOMATED DIFF    Collection Time: 03/17/23 12:09 PM   Result Value Ref Range    WBC 13.1 (H) 3.6 - 11.0 K/uL    RBC 2.22 (L) 3.80 - 5.20 M/uL    HGB 6.2 (L) 11.5 - 16.0 g/dL    HCT 19.8 (L) 35.0 - 47.0 %    MCV 89.2 80.0 - 99.0 FL    MCH 27.9 26.0 - 34.0 PG    MCHC 31.3 30.0 - 36.5 g/dL    RDW 21.5 (H) 11.5 - 14.5 %    PLATELET 074 300 - 123 K/uL    MPV 10.5 8.9 - 12.9 FL    NRBC 5.2 (H) 0.0  WBC    ABSOLUTE NRBC 0.75 (H) 0.00 - 0.01 K/uL    NEUTROPHILS 86 (H) 32 - 75 %    BAND NEUTROPHILS 2 0 - 6 %    LYMPHOCYTES 9 (L) 12 - 49 %    MONOCYTES 3 (L) 5 - 13 %    EOSINOPHILS 0 0 - 7 %    BASOPHILS 0 0 - 1 %    NRBC 10.0  WBC    IMMATURE GRANULOCYTES 0 %    ABS. NEUTROPHILS 11.5 (H) 1.8 - 8.0 K/UL    ABS. LYMPHOCYTES 1.2 0.8 - 3.5 K/UL    ABS. MONOCYTES 0.4 0.0 - 1.0 K/UL    ABS. EOSINOPHILS 0.0 0.0 - 0.4 K/UL    ABS. BASOPHILS 0.0 0.0 - 0.1 K/UL    ABSOLUTE NRBC 1.31 K/uL    ABS. IMM. GRANS. 0.0 K/UL    DF AUTOMATED      RBC COMMENTS Anisocytosis  1+        RBC COMMENTS Hypochromia  1+        RBC COMMENTS Ovalocytes  1+       METABOLIC PANEL, COMPREHENSIVE    Collection Time: 03/17/23 12:09 PM   Result Value Ref Range    Sodium 135 (L) 136 - 145 mmol/L    Potassium 5.0 3.5 - 5.1 mmol/L    Chloride 95 (L) 97 - 108 mmol/L    CO2 22 21 - 32 mmol/L    Anion gap 18 (H) 5 - 15 mmol/L    Glucose 25 (LL) 65 - 100 mg/dL    BUN 54 (H) 6 - 20 mg/dL    Creatinine 3.41 (H) 0.55 - 1.02 mg/dL    BUN/Creatinine ratio 16 12 - 20      eGFR 14 (L) >60 ml/min/1.73m2    Calcium 8.4 (L) 8.5 - 10.1 mg/dL    Bilirubin, total 1.1 (H) 0.2 - 1.0 mg/dL    AST (SGOT) 39 (H) 15 - 37 U/L    ALT (SGPT) 17 12 - 78 U/L    Alk.  phosphatase 145 (H) 45 - 117 U/L    Protein, total 7.3 6.4 - 8.2 g/dL Albumin 2.0 (L) 3.5 - 5.0 g/dL    Globulin 5.3 (H) 2.0 - 4.0 g/dL    A-G Ratio 0.4 (L) 1.1 - 2.2     TROPONIN-HIGH SENSITIVITY    Collection Time: 03/17/23 12:09 PM   Result Value Ref Range    Troponin-High Sensitivity 90 (H) 0 - 51 ng/L   NT-PRO BNP    Collection Time: 03/17/23 12:09 PM   Result Value Ref Range    NT pro-BNP >35,000 (H) <125 pg/mL   MAGNESIUM    Collection Time: 03/17/23 12:09 PM   Result Value Ref Range    Magnesium 2.1 1.6 - 2.4 mg/dL   TYPE & SCREEN    Collection Time: 03/17/23  4:00 PM   Result Value Ref Range    Crossmatch Expiration 03/20/2023,2359     ABO/Rh(D) O Positive     Antibody screen Negative     Unit number [id number]     Blood component type RC LR     Unit division 00     Status of unit Issued,final     TRANSFUSION STATUS Ok to transfuse     Crossmatch result Compatible    CULTURE, BLOOD    Collection Time: 03/17/23  4:00 PM    Specimen: Blood   Result Value Ref Range    Special Requests: No Special Requests      Culture result: No growth after 15 hours     GLUCOSE, POC    Collection Time: 03/17/23  4:04 PM   Result Value Ref Range    Glucose (POC) 96 65 - 100 mg/dL    Performed by Phillip Melendez    COVID-19 RAPID TEST    Collection Time: 03/18/23  1:58 AM   Result Value Ref Range    COVID-19 rapid test Not Detected Not Detected     INFLUENZA A & B AG (RAPID TEST)    Collection Time: 03/18/23  1:58 AM   Result Value Ref Range    Influenza A Antigen Negative Negative      Influenza B Antigen Negative Negative     PROCALCITONIN    Collection Time: 03/18/23  2:29 AM   Result Value Ref Range    Procalcitonin 8.31 (H) 0 ng/mL   CULTURE, BLOOD, PAIRED    Collection Time: 03/18/23  2:29 AM    Specimen: Blood   Result Value Ref Range    Special Requests: No Special Requests      Culture result: No growth after 5 hours     CHEM8,LACTIC ACID,POC    Collection Time: 03/18/23  2:38 AM   Result Value Ref Range    Glucose, POC 56 (L) 65 - 100 mg/dL    eGFR (POC) Not calculated >60 ml/min/1.73m2    Lactic Acid (POC) 5.48 (HH) 0.40 - 2.00 mmol/L    Sample source Venous      Performed by Rocael Campbell     Critical value read back PEDRO    CBC WITH AUTOMATED DIFF    Collection Time: 03/18/23  2:49 AM   Result Value Ref Range    WBC 12.7 (H) 3.6 - 11.0 K/uL    RBC 2.37 (L) 3.80 - 5.20 M/uL    HGB 6.7 (L) 11.5 - 16.0 g/dL    HCT 20.5 (L) 35.0 - 47.0 %    MCV 86.5 80.0 - 99.0 FL    MCH 28.3 26.0 - 34.0 PG    MCHC 32.7 30.0 - 36.5 g/dL    RDW 19.2 (H) 11.5 - 14.5 %    PLATELET 275 848 - 835 K/uL    MPV 10.2 8.9 - 12.9 FL    NRBC 6.8 (H) 0.0  WBC    ABSOLUTE NRBC 0.86 (H) 0.00 - 0.01 K/uL    NEUTROPHILS 85 (H) 32 - 75 %    LYMPHOCYTES 9 (L) 12 - 49 %    MONOCYTES 6 5 - 13 %    EOSINOPHILS 0 0 - 7 %    BASOPHILS 0 0 - 1 %    IMMATURE GRANULOCYTES 0 %    ABS. NEUTROPHILS 10.8 (H) 1.8 - 8.0 K/UL    ABS. LYMPHOCYTES 1.1 0.8 - 3.5 K/UL    ABS. MONOCYTES 0.8 0.0 - 1.0 K/UL    ABS. EOSINOPHILS 0.0 0.0 - 0.4 K/UL    ABS. BASOPHILS 0.0 0.0 - 0.1 K/UL    ABS. IMM.  GRANS. 0.0 K/UL    DF Smear Scanned      RBC COMMENTS Anisocytosis  2+        RBC COMMENTS Polychromasia  1+        RBC COMMENTS Target Cells  1+        RBC COMMENTS Macrocytosis  1+       C REACTIVE PROTEIN, QT    Collection Time: 03/18/23  2:49 AM   Result Value Ref Range    C-Reactive protein 9.22 (H) 0.00 - 0.60 mg/dL   SED RATE (ESR)    Collection Time: 03/18/23  2:49 AM   Result Value Ref Range    Sed rate, automated 62 (H) 0 - 30 mm/hr   RENAL FUNCTION PANEL    Collection Time: 03/18/23  2:49 AM   Result Value Ref Range    Sodium 134 (L) 136 - 145 mmol/L    Potassium 4.2 3.5 - 5.1 mmol/L    Chloride 97 97 - 108 mmol/L    CO2 27 21 - 32 mmol/L    Anion gap 10 5 - 15 mmol/L    Glucose 63 (L) 65 - 100 mg/dL    BUN 47 (H) 6 - 20 mg/dL    Creatinine 2.68 (H) 0.55 - 1.02 mg/dL    BUN/Creatinine ratio 18 12 - 20      eGFR 19 (L) >60 ml/min/1.73m2    Calcium 8.3 (L) 8.5 - 10.1 mg/dL    Phosphorus 4.0 2.6 - 4.7 mg/dL    Albumin 2.1 (L) 3.5 - 5.0 g/dL   TSH 3RD GENERATION    Collection Time: 03/18/23  2:49 AM   Result Value Ref Range    TSH 0.45 0.36 - 3.74 uIU/mL   LACTIC ACID    Collection Time: 03/18/23  2:49 AM   Result Value Ref Range    Lactic acid 5.5 (HH) 0.4 - 2.0 mmol/L   GLUCOSE, POC    Collection Time: 03/18/23  3:32 AM   Result Value Ref Range    Glucose (POC) 46 (LL) 65 - 100 mg/dL    Performed by Mariela Pagan, POC    Collection Time: 03/18/23  4:48 AM   Result Value Ref Range    Glucose (POC) 56 (L) 65 - 100 mg/dL    Performed by Tricia Tamayo    GLUCOSE, POC    Collection Time: 03/18/23  5:29 AM   Result Value Ref Range    Glucose (POC) 154 (H) 65 - 100 mg/dL    Performed by Tricia Tamayo    GLUCOSE, POC    Collection Time: 03/18/23  6:34 AM   Result Value Ref Range    Glucose (POC) 150 (H) 65 - 100 mg/dL    Performed by Tricia Tamayo    GLUCOSE, POC    Collection Time: 03/18/23  8:48 AM   Result Value Ref Range    Glucose (POC) 74 65 - 100 mg/dL    Performed by Yan Silva      CT Chest (3/17)        Physical Exam  Vitals and nursing note reviewed. Constitutional:       General: She is in acute distress. Appearance: She is ill-appearing. Comments:   Cachectic    Room Air SpO2 99%   HENT:      Head: Normocephalic and atraumatic. Right Ear: External ear normal.      Left Ear: External ear normal.      Nose: Nose normal.      Mouth/Throat:      Pharynx: Oropharynx is clear. Eyes:      Pupils: Pupils are equal, round, and reactive to light. Cardiovascular:      Rate and Rhythm: Regular rhythm. Tachycardia present. Heart sounds: No murmur heard. Pulmonary:      Breath sounds: No rhonchi. Comments: Decreased BS right hemithorax  Abdominal:      General: Bowel sounds are normal. There is no distension. Palpations: Abdomen is soft. Tenderness: There is no abdominal tenderness. Genitourinary:     Comments: No Hess catheter  Musculoskeletal:      Cervical back: Neck supple.       Right lower leg: No edema. Left lower leg: No edema. Skin:     Findings: No rash. Neurological:      General: No focal deficit present. Mental Status: She is oriented to person, place, and time. Psychiatric:         Mood and Affect: Mood normal.         Behavior: Behavior normal.         Thought Content: Thought content normal.         Judgment: Judgment normal.     ASSESSMENT/PLAN    Empyema, right lung, secondary to Granulicatella adiacens and Streptococcus oralis/mitais  Sepsis with leukocytosis, elevated lactic acid, CRP and procal  ESRD on hemodialysis    Comment: Granulicatella are also streptococci and like Streptococcus oralis/mitalis, are part of the oral joshua, which when aspirated can result in severe lung infection. Both are susceptible to Vancomycin. Granulicatella is usually resistant to Cefepime. Since no Gram negative rods, either aerobic or anaerobic have been identified, I would favor Vancomycin  alone. Patient is remarkably stable but  I think that chest tube will be needed. Discontinue Cefepime (only single dose)  Continue IV Vancomycin  Follow-up blood cultures  If bacteremic, would obtain TTE  Consult General Surgery for possible chest tube      Nikolay Landeros MD

## 2023-03-18 NOTE — PROGRESS NOTES
Reason for Admission:  SOB                  RUR Score:  22         PCP: First and Last name:  Karen Kirk MD     Name of Practice:   Are you a current patient: Yes/No:Yes   Approximate date of last visit: This week   Can you do a virtual visit with your PCP: Yes             Resources/supports as identified by patient/family:                   Top Challenges facing patient (as identified by patient/family and CM): Finances/Medication cost?   Yes                 Transportation? Friends              Support system or lack thereof? Family support                     Living arrangements? Live alone             Self-care/ADLs/Cognition? A+O           Current Advanced Directive/Advance Care Plan:  Full Code      Healthcare Decision Maker:   Click here to complete HealthCare Decision Makers including selection of the Healthcare Decision Maker Relationship (ie \"Primary\")      Primary Decision Maker: [facility] Roshni Berrios [facility] - [phone]    Payor Source Payor: [employer] / Plan: [address] [place] [address] / Product Type: PPO /                             Plan for utilizing home health:   Agree if needed                  Transition of Care Plan:        CM discussed discharge planning with patient at bedside. Patient will return home selfcare at discharge. Friends will transport. Patient lives alone in a one story home with one step to enter. Patient is independent with ADL/IADL care and self medicate. Friends drive to MD appt. [facility]. DME: Rolling walker. No previous services. Patient verified demographics.

## 2023-03-18 NOTE — PROGRESS NOTES
Vancomycin Dosing Consult  Trever Parks is a 70 y.o. female with CAP. Pharmacy was consulted by Dr. Daisy Lopez to dose and monitor vancomycin. Today is day 1. Antibiotic regimen: Vancomycin monotherapy    Temp (24hrs), Av.6 °F (36.4 °C), Min:96.1 °F (35.6 °C), Max:98 °F (36.7 °C)    Recent Labs     23  0249 23  1209   WBC 12.7* 13.1*   CREA 2.68* 3.41*   BUN 47* 54*     Est CrCl: 15.3 mL/min  Concomitant nephrotoxic drugs: None    Cultures:   3/18/23 Blood: Pending    MRSA Swab: Detected 23    Target range: Trough 21-24 mcg/mL (Intermittent hemodialysis, invasive MRSA infection or sepsis)    Recent level history:  Date/Time Dose & Interval Measured Level (mcg/mL) Associated AUC/SANDRITA   3/18/23 at 02:58 1000 mg x 1 dose 12.6         Assessment/Plan:   Elevated WBC, CRP and Procalcitonin. Patient received Dialysis yesterday evening. Then earlier this morning at 0300, patient received Vancomycin 1000 mg x 1 dose. Vancomycin level resulted 12.6 which is subtherapeutic (for dialysis patient), therefore pharmacy will order a dose of Vancomycin 500 mg to be given tonight with expected trough to be ~25. Pharmacy will order another random level in the morning tomorrow. Antimicrobial stop date TBD.

## 2023-03-18 NOTE — PROGRESS NOTES
Problem: Falls - Risk of  Goal: *Absence of Falls  Description: Document Eudelia Romberg Fall Risk and appropriate interventions in the flowsheet.   Outcome: Progressing Towards Goal  Note: Fall Risk Interventions:                                Problem: Patient Education: Go to Patient Education Activity  Goal: Patient/Family Education  Outcome: Progressing Towards Goal

## 2023-03-18 NOTE — H&P
's  Hospitalist History & Physical Notes. [facility] Name : [facility]ella [facility]      MRN number : [id number]     YOB: 1955     Subjective :   Chief Complaint : Shortness of breath, sinus tachycardia, missing dialysis. Source of information : Mostly from the ED provider, patient with lethargy did not communicate much. History of present illness:   Angella Parker is  70 y.o. female with a history of end-stage renal disease on hemodialysis, hypertension, heart failure with reduced ejection fraction is brought to the emergency room due to shortness of breath that is worse from baseline. She is tachypneic and tachycardic, was evaluated in the emergency room, seen by nephrology and taken to the dialysis as she admits that might have been causing the issues. She returned from the dialysis, still tachycardic. On evaluation she looks cachectic and very tired. On evaluation by the emergency room physician patient is still tachypneic and tachycardic. Also she is very lethargic with change in mental status alert with more of lethargy. Lives alone at home. On recent admission to the hospital she is found with pleural effusion, had thoracentesis done by interventional radiology, they documented it was empyema with the typical fluid blocking the needle, malodorous. Sent for cultures. Also found with a lot of gas that was released during the procedure. Cultures with Staphylococcus. But I do not see any antibiotics that was started. Chest x-ray today suggestive of right base loss of volume, so I requested for a CT of the chest, which came back as empyema. Started on antibiotics IV vancomycin and cefepime, requested infectious disease consultation for recommendations of antibiotics, she may need  long-term antibiotic. Also need a consultation for evaluation if she needed any decortication.     Past Medical History:   Diagnosis Date    Heart failure ([address] Hypertension     Kidney disease      Past Surgical History:   Procedure Laterality Date    HX KNEE REPLACEMENT Right     HX UROLOGICAL  09/23/2021    cystospoy    IR THORACENTESIS NDL PUNC ASP W IMAGE  2/27/2023     Family History   Problem Relation Age of Onset    Hypertension Mother       Social History     Tobacco Use    Smoking status: Former     Years: 10.00     Types: Cigarettes    Smokeless tobacco: Never   Substance Use Topics    Alcohol use: Not Currently       No Known Allergies   Current Facility-Administered Medications   Medication Dose Route Frequency Provider Last Rate Last Admin    carvediloL (COREG) tablet 12.5 mg  12.5 mg Oral BID Shlomo Best MD        docusate sodium (COLACE) capsule 250 mg  250 mg Oral DAILY Shlomo Best MD        sodium chloride (NS) flush 5-40 mL  5-40 mL IntraVENous Q8H Shlomo Best MD        sodium chloride (NS) flush 5-40 mL  5-40 mL IntraVENous PRN Shlomo Best MD        acetaminophen (TYLENOL) tablet 650 mg  650 mg Oral Q6H PRN Shlomo Best MD        Or    acetaminophen (TYLENOL) suppository 650 mg  650 mg Rectal Q6H PRN Shlomo Best MD        ondansetron (ZOFRAN ODT) tablet 4 mg  4 mg Oral Q6H PRN Shlomo Best MD        Or    ondansetron (ZOFRAN) injection 4 mg  4 mg IntraVENous Q6H PRN Shlomo Best MD        dextrose 10% infusion 0-250 mL  0-250 mL IntraVENous PRN Josefa Blanca MD   250 mL at 03/17/23 1400    0.9% sodium chloride infusion 250 mL  250 mL IntraVENous PRN Josefa Blanca MD        lidocaine 4 % patch 1 Patch  1 Patch TransDERmal Q24H Aaron Maurer MD   1 Patch at 03/17/23 2248     Current Outpatient Medications   Medication Sig Dispense Refill    furosemide (LASIX) 80 mg tablet Take 80 mg by mouth two (2) times a day. metOLazone (ZAROXOLYN) 2.5 mg tablet Take 2.5 mg by mouth. docusate sodium (DOK) 250 mg capsule Take 250 mg by mouth daily. sevelamer carbonate (RENVELA) 800 mg tab tab Take 1,600 mg by mouth three (3) times daily. carvediloL (COREG) 12.5 mg tablet Take 12.5 mg by mouth two (2) times a day. Review of Systems:    Very lethargic and tachypneic when I seen her. Heart rate is between 100- 230. As she seems to be more unstable I requested to change admission to ICU. Vitals:   Patient Vitals for the past 12 hrs:   Temp Pulse Resp BP SpO2   03/18/23 0004 -- (!) 121 (!) 31 109/83 97 %   03/17/23 2353 97.5 °F (36.4 °C) (!) 112 16 115/76 --   03/17/23 2344 -- (!) 114 27 115/76 98 %   03/17/23 2334 -- (!) 116 28 (!) 107/90 100 %   03/17/23 2305 -- (!) 111 18 (!) 177/143 --   03/17/23 2245 -- (!) 114 30 (!) 112/102 99 %   03/17/23 2105 -- (!) 109 22 (!) 158/125 100 %   03/17/23 2050 -- (!) 112 21 96/73 100 %   03/17/23 2035 -- (!) 111 24 99/75 100 %   03/17/23 2020 -- (!) 115 22 105/81 100 %   03/17/23 2005 -- (!) 112 24 100/65 100 %   03/17/23 2001 97.6 °F (36.4 °C) (!) 110 22 100/65 100 %   03/17/23 1946 -- (!) 109 23 99/70 100 %   03/17/23 1940 98 °F (36.7 °C) (!) 106 20 99/70 100 %   03/17/23 1937 98 °F (36.7 °C) (!) 105 21 98/67 100 %   03/17/23 1530 98.1 °F (36.7 °C) -- -- 111/65 --   03/17/23 1430 -- (!) 113 23 -- --       Physical Exam: Limited exam due to patient condition. General : Looks very tired, looks cachectic, tachypnea noted. Lethargy, unable to communicate. HEENT : PERRLA, dry oral mucosa, atraumatic normocephalic, Normal ear and nose. Neck : Supple, no JVD, no masses noted, no carotid bruit. Lungs : Breath sounds with more rate air entry bilaterally, tachypnea as mentioned and using accessory muscles. CVS : Rhythm rate regular, S1+, S2+,   Abdomen : Soft, nontender, bowel sounds active. Extremities : No edema noted,  pedal pulses palpable. Musculoskeletal : Generalized wasting of muscles. No joint swelling or effusion, muscle tone and power appears normal.   Skin : Dry, poor skin turgor.   No pathological rash. Lymphatic : No cervical lymphadenopathy. Neurological : Very lethargic. Data Review:   Recent Results (from the past 24 hour(s))   CBC WITH AUTOMATED DIFF    Collection Time: 03/17/23 12:09 PM   Result Value Ref Range    WBC 13.1 (H) 3.6 - 11.0 K/uL    RBC 2.22 (L) 3.80 - 5.20 M/uL    HGB 6.2 (L) 11.5 - 16.0 g/dL    HCT 19.8 (L) 35.0 - 47.0 %    MCV 89.2 80.0 - 99.0 FL    MCH 27.9 26.0 - 34.0 PG    MCHC 31.3 30.0 - 36.5 g/dL    RDW 21.5 (H) 11.5 - 14.5 %    PLATELET 386 519 - 293 K/uL    MPV 10.5 8.9 - 12.9 FL    NRBC 5.2 (H) 0.0  WBC    ABSOLUTE NRBC 0.75 (H) 0.00 - 0.01 K/uL    NEUTROPHILS 86 (H) 32 - 75 %    BAND NEUTROPHILS 2 0 - 6 %    LYMPHOCYTES 9 (L) 12 - 49 %    MONOCYTES 3 (L) 5 - 13 %    EOSINOPHILS 0 0 - 7 %    BASOPHILS 0 0 - 1 %    NRBC 10.0  WBC    IMMATURE GRANULOCYTES 0 %    ABS. NEUTROPHILS 11.5 (H) 1.8 - 8.0 K/UL    ABS. LYMPHOCYTES 1.2 0.8 - 3.5 K/UL    ABS. MONOCYTES 0.4 0.0 - 1.0 K/UL    ABS. EOSINOPHILS 0.0 0.0 - 0.4 K/UL    ABS. BASOPHILS 0.0 0.0 - 0.1 K/UL    ABSOLUTE NRBC 1.31 K/uL    ABS. IMM. GRANS. 0.0 K/UL    DF AUTOMATED      RBC COMMENTS Anisocytosis  1+        RBC COMMENTS Hypochromia  1+        RBC COMMENTS Ovalocytes  1+       METABOLIC PANEL, COMPREHENSIVE    Collection Time: 03/17/23 12:09 PM   Result Value Ref Range    Sodium 135 (L) 136 - 145 mmol/L    Potassium 5.0 3.5 - 5.1 mmol/L    Chloride 95 (L) 97 - 108 mmol/L    CO2 22 21 - 32 mmol/L    Anion gap 18 (H) 5 - 15 mmol/L    Glucose 25 (LL) 65 - 100 mg/dL    BUN 54 (H) 6 - 20 mg/dL    Creatinine 3.41 (H) 0.55 - 1.02 mg/dL    BUN/Creatinine ratio 16 12 - 20      eGFR 14 (L) >60 ml/min/1.73m2    Calcium 8.4 (L) 8.5 - 10.1 mg/dL    Bilirubin, total 1.1 (H) 0.2 - 1.0 mg/dL    AST (SGOT) 39 (H) 15 - 37 U/L    ALT (SGPT) 17 12 - 78 U/L    Alk.  phosphatase 145 (H) 45 - 117 U/L    Protein, total 7.3 6.4 - 8.2 g/dL    Albumin 2.0 (L) 3.5 - 5.0 g/dL    Globulin 5.3 (H) 2.0 - 4.0 g/dL    A-G Ratio 0.4 (L) 1.1 - 2.2     TROPONIN-HIGH SENSITIVITY    Collection Time: 03/17/23 12:09 PM   Result Value Ref Range    Troponin-High Sensitivity 90 (H) 0 - 51 ng/L   NT-PRO BNP    Collection Time: 03/17/23 12:09 PM   Result Value Ref Range    NT pro-BNP >35,000 (H) <125 pg/mL   MAGNESIUM    Collection Time: 03/17/23 12:09 PM   Result Value Ref Range    Magnesium 2.1 1.6 - 2.4 mg/dL   TYPE & SCREEN    Collection Time: 03/17/23  4:00 PM   Result Value Ref Range    Crossmatch Expiration 03/20/2023,2359     ABO/Rh(D) O Positive     Antibody screen Negative     Unit number [id number]     Blood component type RC LR     Unit division 00     Status of unit [address] to transfuse     Crossmatch result Compatible    GLUCOSE, POC    Collection Time: 03/17/23  4:04 PM   Result Value Ref Range    Glucose (POC) 96 65 - 100 mg/dL    Performed by Yulisa Cervantes        Radiologic Studies :   CT Results  (Last 48 hours)      None          CXR Results  (Last 48 hours)                 03/17/23 1445  XR CHEST PORT Final result    Impression:  No acute process. Cardiomegaly. Narrative:  PORTABLE CHEST RADIOGRAPH/S: 3/17/2023 2:45 PM       INDICATION: Chest pain. COMPARISON: 2/27/2023. TECHNIQUE: Portable frontal radiograph/s of the chest.       FINDINGS:    There is volume loss in the right lung, the right hemidiaphragm is eventrated or   elevated, and the hepatic flexure is interposed between the diaphragm and the   liver (Chilaiditi variant). Passive atelectasis in the right lung base is   associated. The left lung is clear. The central airways are patent. No   pneumothorax and no large pleural effusion. The heart is enlarged, even given   portable technique. A right IJ hemodialysis catheter terminates in the right   atrium. Assessment and Plan :     Metabolic encephalopathy: Etiology most likely infectious versus from end-stage renal disease.   We will request further evaluation    Empyema right lower lobe: Started on cefepime and vancomycin, will request consultation from infectious disease, we have cultures with the sensitivities available from the last admission. We will follow with recommendations    Sinus tachycardia: Likely from decompensation due to her general condition and infectious etiology. Mild respiratory distress: On oxygen nasal cannula    Failure to thrive: Secondary to multiple comorbid conditions and chronic diseases. End-stage renal disease: On hemodialysis, already seen and evaluated by nephrology. Admitted to ICU, full CODE STATUS, cannot make decisions at this time due to her decreased mentation. Home medications reviewed with external Rx history. CC : Medina Cervantes MD  Signed By: Aris Jauregui MD     March 18, 2023      This dictation was done by dragon, computer voice recognition software. Often unanticipated grammatical, syntax, [place] phones and other interpretive errors are inadvertently transcribed. Please excuse errors that have escaped final proofreading.

## 2023-03-18 NOTE — PROGRESS NOTES
Renal Progress Note    NAME:  Van Sr   :   1955   MRN:   [id number]     Date/Time:  3/18/2023 3:28 PM      Assessment:     ESRD - had HD yesterday  Altered mental status - resolving  Anemia in CKD  Secondary Hyperparathyroidism - at target for ESRD       Plan: Will hold off HD today. Ms Audra Pedersen mental status is improved. There is no S 3 gallop or pericardial rub. Volume Overload does not appear to be an issue today. Additionally, BPs are a bit soft. Nutritional support. Dose meds for her GFR. Will give a dose of EPO today. Follow lytes + cbc. Subjective:         F/U -ESRD - 3/18/23      Feeling better. Dyspnea was not an issue this afternoon.       Review of Systems:  Y  N       Y  N  []         []          Fever/chills                                               []         []          Chest Pain  []         []          Cough                                                       []         []          Diarrhea   []         []          Sputum                                                     []         []          Constipation  []         []          SOB/MELO                                                []         []          Nausea/Vomit  []         []          Abd Pain                                                    []         []          Tolerating PT  []         []          Dysuria                                                      []         []          Tolerating Diet     []        Unable to obtain  ROS due to  []        mental status change  []        sedated   []        intubated    Medications reviewed:  Current Facility-Administered Medications   Medication Dose Route Frequency    carvediloL (COREG) tablet 12.5 mg  12.5 mg Oral BID    sodium chloride (NS) flush 5-40 mL  5-40 mL IntraVENous Q8H    sodium chloride (NS) flush 5-40 mL  5-40 mL IntraVENous PRN    acetaminophen (TYLENOL) tablet 650 mg  650 mg Oral Q6H PRN    Or    acetaminophen (TYLENOL) suppository 650 mg  650 mg Rectal Q6H PRN    ondansetron (ZOFRAN ODT) tablet 4 mg  4 mg Oral Q6H PRN    Or    ondansetron (ZOFRAN) injection 4 mg  4 mg IntraVENous Q6H PRN    sodium chloride (NS) flush 5-10 mL  5-10 mL IntraVENous PRN    docusate (COLACE) 50 mg/5 mL oral liquid 250 mg  250 mg Oral DAILY    Vancomycin Pharmacy Dosing   Other Rx Dosing/Monitoring    dextrose 5% infusion  50 mL/hr IntraVENous CONTINUOUS    0.9% sodium chloride infusion 250 mL  250 mL IntraVENous PRN    dronabinoL (MARINOL) capsule 10 mg  10 mg Oral BID    0.9% sodium chloride infusion 250 mL  250 mL IntraVENous PRN    dextrose 10% infusion 0-250 mL  0-250 mL IntraVENous PRN    0.9% sodium chloride infusion 250 mL  250 mL IntraVENous PRN    lidocaine 4 % patch 1 Patch  1 Patch TransDERmal Q24H        Objective:   Vitals:  Visit Vitals  BP (!) 86/60 (BP 1 Location: Left upper arm, BP Patient Position: At rest)   Pulse 83   Temp 97.5 °F (36.4 °C)   Resp 20   Ht 4' 11\" (1.499 m)   Wt 58.1 kg (128 lb)   SpO2 97%   BMI 25.85 kg/m²     Temp (24hrs), Av.6 °F (36.4 °C), Min:96.1 °F (35.6 °C), Max:98.1 °F (36.7 °C)      O2 Device: None (Room air)    Last 24hr Input/Output:    Intake/Output Summary (Last 24 hours) at 3/18/2023 1528  Last data filed at 3/18/2023 1526  Gross per 24 hour   Intake 1208.33 ml   Output 0 ml   Net 1208.33 ml        PHYSICAL EXAM:      Seen in Room 272    General:    Chronically ill-appearing lady sitting up comfortably in bed. Head:   Normocephalic    Eyes:   Anicteric    Chest :            R. IJ Tunneled HD catheter. Lungs:   Clear to auscultation, no wheezes, no rales    CVS                 No S 3 gallop , no pericardial rub. Abdomen:   Not distended. Extremities: Minimal leg oedema    Psych:  Not anxious or agitated.     Neurologic: Responding to questions appropriately        []        Telemetry Reviewed     []        NSR []        PAC/PVCs   []        Afib  []        Paced   []        NSVT   [] Hess []        NGT  []        Intubated on vent    Lab Data Reviewed:    Recent Results (from the past 24 hour(s))   TYPE & SCREEN    Collection Time: 03/17/23  4:00 PM   Result Value Ref Range    Crossmatch Expiration 03/20/2023,2359     ABO/Rh(D) O Positive     Antibody screen Negative     Unit number [id number]     Blood component type RC LR     Unit division 00     Status of unit Issued,final     TRANSFUSION STATUS Ok to transfuse     Crossmatch result Compatible     Unit number [id number]     Blood component type RC LR,2     Unit division 00     Status of unit [address] to transfuse     Crossmatch result Compatible    CULTURE, BLOOD    Collection Time: 03/17/23  4:00 PM    Specimen: Blood   Result Value Ref Range    Special Requests: No Special Requests      Culture result: No growth after 20 hours     GLUCOSE, POC    Collection Time: 03/17/23  4:04 PM   Result Value Ref Range    Glucose (POC) 96 65 - 100 mg/dL    Performed by Kusum Heck    COVID-19 RAPID TEST    Collection Time: 03/18/23  1:58 AM   Result Value Ref Range    COVID-19 rapid test Not Detected Not Detected     INFLUENZA A & B AG (RAPID TEST)    Collection Time: 03/18/23  1:58 AM   Result Value Ref Range    Influenza A Antigen Negative Negative      Influenza B Antigen Negative Negative     PROCALCITONIN    Collection Time: 03/18/23  2:29 AM   Result Value Ref Range    Procalcitonin 8.31 (H) 0 ng/mL   PTH INTACT    Collection Time: 03/18/23  2:29 AM   Result Value Ref Range    Calcium 8.3 (L) 8.5 - 10.1 mg/dL    PTH, Intact 207.6 (H) 18.4 - 88.0 pg/mL   CULTURE, BLOOD, PAIRED    Collection Time: 03/18/23  2:29 AM    Specimen: Blood   Result Value Ref Range    Special Requests: No Special Requests      Culture result: No growth after 10 hours     CHEM8,LACTIC ACID,POC    Collection Time: 03/18/23  2:38 AM   Result Value Ref Range    Glucose, POC 56 (L) 65 - 100 mg/dL    eGFR (POC) Not calculated >60 ml/min/1.73m2    Lactic Acid (POC) 5.48 (HH) 0.40 - 2.00 mmol/L    Sample source Venous      Performed by Shanell Livingston     Critical value read back PEDRO    CBC WITH AUTOMATED DIFF    Collection Time: 03/18/23  2:49 AM   Result Value Ref Range    WBC 12.7 (H) 3.6 - 11.0 K/uL    RBC 2.37 (L) 3.80 - 5.20 M/uL    HGB 6.7 (L) 11.5 - 16.0 g/dL    HCT 20.5 (L) 35.0 - 47.0 %    MCV 86.5 80.0 - 99.0 FL    MCH 28.3 26.0 - 34.0 PG    MCHC 32.7 30.0 - 36.5 g/dL    RDW 19.2 (H) 11.5 - 14.5 %    PLATELET 078 282 - 008 K/uL    MPV 10.2 8.9 - 12.9 FL    NRBC 6.8 (H) 0.0  WBC    ABSOLUTE NRBC 0.86 (H) 0.00 - 0.01 K/uL    NEUTROPHILS 85 (H) 32 - 75 %    LYMPHOCYTES 9 (L) 12 - 49 %    MONOCYTES 6 5 - 13 %    EOSINOPHILS 0 0 - 7 %    BASOPHILS 0 0 - 1 %    IMMATURE GRANULOCYTES 0 %    ABS. NEUTROPHILS 10.8 (H) 1.8 - 8.0 K/UL    ABS. LYMPHOCYTES 1.1 0.8 - 3.5 K/UL    ABS. MONOCYTES 0.8 0.0 - 1.0 K/UL    ABS. EOSINOPHILS 0.0 0.0 - 0.4 K/UL    ABS. BASOPHILS 0.0 0.0 - 0.1 K/UL    ABS. IMM.  GRANS. 0.0 K/UL    DF Smear Scanned      RBC COMMENTS Anisocytosis  2+        RBC COMMENTS Polychromasia  1+        RBC COMMENTS Target Cells  1+        RBC COMMENTS Macrocytosis  1+       C REACTIVE PROTEIN, QT    Collection Time: 03/18/23  2:49 AM   Result Value Ref Range    C-Reactive protein 9.22 (H) 0.00 - 0.60 mg/dL   SED RATE (ESR)    Collection Time: 03/18/23  2:49 AM   Result Value Ref Range    Sed rate, automated 62 (H) 0 - 30 mm/hr   RENAL FUNCTION PANEL    Collection Time: 03/18/23  2:49 AM   Result Value Ref Range    Sodium 134 (L) 136 - 145 mmol/L    Potassium 4.2 3.5 - 5.1 mmol/L    Chloride 97 97 - 108 mmol/L    CO2 27 21 - 32 mmol/L    Anion gap 10 5 - 15 mmol/L    Glucose 63 (L) 65 - 100 mg/dL    BUN 47 (H) 6 - 20 mg/dL    Creatinine 2.68 (H) 0.55 - 1.02 mg/dL    BUN/Creatinine ratio 18 12 - 20      eGFR 19 (L) >60 ml/min/1.73m2    Calcium 8.3 (L) 8.5 - 10.1 mg/dL    Phosphorus 4.0 2.6 - 4.7 mg/dL    Albumin 2.1 (L) 3.5 - 5.0 g/dL   TSH 3RD GENERATION    Collection Time: 03/18/23  2:49 AM   Result Value Ref Range    TSH 0.45 0.36 - 3.74 uIU/mL   LACTIC ACID    Collection Time: 03/18/23  2:49 AM   Result Value Ref Range    Lactic acid 5.5 (HH) 0.4 - 2.0 mmol/L   GLUCOSE, POC    Collection Time: 03/18/23  3:32 AM   Result Value Ref Range    Glucose (POC) 46 (LL) 65 - 100 mg/dL    Performed by Robe Kirby, POC    Collection Time: 03/18/23  4:48 AM   Result Value Ref Range    Glucose (POC) 56 (L) 65 - 100 mg/dL    Performed by Jorge Hanna    GLUCOSE, POC    Collection Time: 03/18/23  5:29 AM   Result Value Ref Range    Glucose (POC) 154 (H) 65 - 100 mg/dL    Performed by Jorge Hanna    GLUCOSE, POC    Collection Time: 03/18/23  6:34 AM   Result Value Ref Range    Glucose (POC) 150 (H) 65 - 100 mg/dL    Performed by Jorge Hanna    GLUCOSE, POC    Collection Time: 03/18/23  8:48 AM   Result Value Ref Range    Glucose (POC) 74 65 - 100 mg/dL    Performed by Genie Dickson    MRSA SCREEN - PCR (NASAL)    Collection Time: 03/18/23  9:00 AM   Result Value Ref Range    MRSA by PCR, Nasal DETECTED (A) Not Detected     VANCOMYCIN, TROUGH    Collection Time: 03/18/23 11:15 AM   Result Value Ref Range    Vancomycin,trough 12.6 (H) 5.0 - 10.0 ug/mL   HGB & HCT    Collection Time: 03/18/23 11:15 AM   Result Value Ref Range    HGB 6.7 (L) 11.5 - 16.0 g/dL    HCT 20.6 (L) 35.0 - 47.0 %   GLUCOSE, POC    Collection Time: 03/18/23 11:17 AM   Result Value Ref Range    Glucose (POC) 70 65 - 100 mg/dL    Performed by Genie Dickson        Total time spent with patient:  []        15   []        25   []        35   []         __ minutes    []        Critical Care Provided    Care Plan discussed with:    ICU Nursing      [x]        Patient   []        Family    []        Care Manager   []        Consultant/Specialist :      []          >50% of visit spent in counseling and coordination of care   (Discussed []        CODE status,  []        Care Plan, [] D/C Planning)    ___________________________________________________    Attending Physician: Tram Delarosa MD

## 2023-03-18 NOTE — PROGRESS NOTES
Hospitalist Progress Note            Daily Progress Note: 3/18/2023 9:38 AM  Hospital course:     Nick Dickens is  70 y.o. female with a history of end-stage renal disease on hemodialysis, hypertension, heart failure with reduced ejection fraction is brought to the emergency room due to shortness of breath that is worse from baseline. She is tachypneic and tachycardic, was evaluated in the emergency room, seen by nephrology and taken to the dialysis as she admits that might have been causing the issues. She returned from the dialysis, still tachycardic. On evaluation she looks cachectic and very tired. On evaluation by the emergency room physician patient is still tachypneic and tachycardic. Also she is very lethargic with change in mental status alert with more of lethargy. Lives alone at home. On recent admission to the hospital she is found with pleural effusion, had thoracentesis done by interventional radiology, they documented it was empyema with the typical fluid blocking the needle, malodorous. Sent for cultures. Also found with a lot of gas that was released during the procedure. Cultures with Staphylococcus. But I do not see any antibiotics that was started. Chest x-ray today suggestive of right base loss of volume, so I requested for a CT of the chest, which came back as empyema. Started on antibiotics IV vancomycin and cefepime, requested infectious disease consultation for recommendations of antibiotics, she may need  long-term antibiotic. Also need a consultation for evaluation if she needed any decortication. She was admitted to the ICU for close monitoring. ID and nephrology were consulted. Significant labs on admission hemoglobin 6.7, WBC 12.7, sodium 134, creatinine 2.68, BUN 47. Will transfuse 1 unit PRBC. We will monitor laboratory values closely for any further declines in hemoglobin or elevations in WBC.   Currently poor oral intake with point-of-care testing in the 70s. We will start gentle D5 infusion at 50 cc an hour until intake has improved to prevent hypoglycemia  Subjective:     Examined patient at the bedside. She is weak and frail. Close friend at the bedside. Discussed plan of care at length with patient and friend. Assessment/Plan:   Active Problems:    Altered mental status (3/18/2023)      Anemia (3/18/2023)      Tachycardia (3/18/2023)      Sinus tachycardia (5/71/5970)    Metabolic encephalopathy:   -Etiology most likely infectious versus from end-stage renal disease. Empyema right lower lobe:   -Started on cefepime and vancomycin,   -ID consulted will request consultation from infectious disease, we have cultures with the   -Sensitivities available from the last admission. Sinus tachycardia:   -Likely from decompensation due to her general condition and infectious etiology. Mild respiratory distress:   -On oxygen nasal cannula     Failure to thrive:   -Secondary to multiple comorbid conditions and chronic diseases. End-stage renal disease:   -On hemodialysis, already seen and evaluated by nephrology. Anemia  -Hemoglobin 6.7 transfuse 2 unit PRBC  -Monitor CBC closely  -Transfuse PRBC for hemoglobin 7.0 or less    Failure to thrive  Poor oral intake  - Gentle IV fluid D5 50 an hour until oral intake improves  -Monitor point-of-care to avoid hypoglycemia  -Marinol initiated to improve appetite       Admitted to ICU, full CODE STATUS, cannot make decisions at this time due to her decreased mentation. Home medications reviewed with external Rx history.       MDM  Number of Diagnoses or Management Options  ESRD on dialysis [facility]): new, no workup  SOB (shortness of breath): new, needed workup  Diagnosis management comments: ID consulted for antibiotic management  Nephrology consulted for hemodialysis  Monitoring of vancomycin with peaks and troughs  Requiring blood transfusions for acute anemia  Monitor daily CBCs, H&H after first unit obtained       Amount and/or Complexity of Data Reviewed  Clinical lab tests: ordered and reviewed (Monitor CBC daily, chemistry BMP daily  Trend inflammatory markers daily  )  Discuss the patient with other providers: yes    Risk of Complications, Morbidity, and/or Mortality  Presenting problems: high  Management options: high    Critical Care  Total time providing critical care: 30-74 minutes    Patient Progress  Patient progress: stable       DVT Prophylaxis: SCDs  Code Status: Full Code  POA/NOK:        Disposition and discharge barriers:   Currently requiring ICU care  Blood transfusion  IV antibiotics  ID consult      Current Facility-Administered Medications   Medication Dose Route Frequency    carvediloL (COREG) tablet 12.5 mg  12.5 mg Oral BID    sodium chloride (NS) flush 5-40 mL  5-40 mL IntraVENous Q8H    sodium chloride (NS) flush 5-40 mL  5-40 mL IntraVENous PRN    acetaminophen (TYLENOL) tablet 650 mg  650 mg Oral Q6H PRN    Or    acetaminophen (TYLENOL) suppository 650 mg  650 mg Rectal Q6H PRN    ondansetron (ZOFRAN ODT) tablet 4 mg  4 mg Oral Q6H PRN    Or    ondansetron (ZOFRAN) injection 4 mg  4 mg IntraVENous Q6H PRN    sodium chloride (NS) flush 5-10 mL  5-10 mL IntraVENous PRN    docusate (COLACE) 50 mg/5 mL oral liquid 250 mg  250 mg Oral DAILY    Vancomycin Pharmacy Dosing   Other Rx Dosing/Monitoring    dextrose 5% infusion  50 mL/hr IntraVENous CONTINUOUS    dextrose 10% infusion 0-250 mL  0-250 mL IntraVENous PRN    0.9% sodium chloride infusion 250 mL  250 mL IntraVENous PRN    lidocaine 4 % patch 1 Patch  1 Patch TransDERmal Q24H        REVIEW OF SYSTEMS    Review of Systems   Constitutional:  Positive for malaise/fatigue. Respiratory:  Negative for shortness of breath. Cardiovascular:  Negative for chest pain. Musculoskeletal:  Positive for myalgias. Neurological:  Positive for weakness. Psychiatric/Behavioral:  The patient is nervous/anxious.        Objective:     Visit Vitals  BP 95/69 (BP 1 Location: Left upper arm, BP Patient Position: At rest)   Pulse 91   Temp (!) 96.1 °F (35.6 °C)   Resp 14   Ht 4' 11\" (1.499 m)   Wt 58.1 kg (128 lb)   SpO2 100%   BMI 25.85 kg/m²      O2 Device: None (Room air)    Temp (24hrs), Av.6 °F (36.4 °C), Min:96.1 °F (35.6 °C), Max:98.1 °F (36.7 °C)        PHYSICAL EXAM:    Physical Exam  Constitutional:       Appearance: She is ill-appearing. Comments: Thin and frail   Cardiovascular:      Rate and Rhythm: Regular rhythm. Tachycardia present. Pulmonary:      Effort: No respiratory distress. Comments: Decreased breath sounds on right  Abdominal:      General: There is no distension. Musculoskeletal:      Right lower leg: No edema. Left lower leg: No edema. Skin:     General: Skin is dry. Neurological:      Mental Status: She is oriented to person, place, and time. Motor: Weakness present.       Gait: Gait abnormal.        Data Review    Recent Results (from the past 24 hour(s))   TYPE & SCREEN    Collection Time: 23  4:00 PM   Result Value Ref Range    Crossmatch Expiration 2023,2359     ABO/Rh(D) O Positive     Antibody screen Negative     Unit number [id number]     Blood component type RC LR     Unit division 00     Status of unit Issued,final     TRANSFUSION STATUS Ok to transfuse     Crossmatch result Compatible    CULTURE, BLOOD    Collection Time: 23  4:00 PM    Specimen: Blood   Result Value Ref Range    Special Requests: No Special Requests      Culture result: No growth after 15 hours     GLUCOSE, POC    Collection Time: 23  4:04 PM   Result Value Ref Range    Glucose (POC) 96 65 - 100 mg/dL    Performed by Vesta Currituck    COVID-19 RAPID TEST    Collection Time: 23  1:58 AM   Result Value Ref Range    COVID-19 rapid test Not Detected Not Detected     INFLUENZA A & B AG (RAPID TEST)    Collection Time: 23  1:58 AM   Result Value Ref Range    Influenza A Antigen Negative Negative      Influenza B Antigen Negative Negative     PROCALCITONIN    Collection Time: 03/18/23  2:29 AM   Result Value Ref Range    Procalcitonin 8.31 (H) 0 ng/mL   CULTURE, BLOOD, PAIRED    Collection Time: 03/18/23  2:29 AM    Specimen: Blood   Result Value Ref Range    Special Requests: No Special Requests      Culture result: No growth after 5 hours     CHEM8,LACTIC ACID,POC    Collection Time: 03/18/23  2:38 AM   Result Value Ref Range    Glucose, POC 56 (L) 65 - 100 mg/dL    eGFR (POC) Not calculated >60 ml/min/1.73m2    Lactic Acid (POC) 5.48 (HH) 0.40 - 2.00 mmol/L    Sample source Venous      Performed by Duy Brower     Critical value read back CHRISTIANONORMA    CBC WITH AUTOMATED DIFF    Collection Time: 03/18/23  2:49 AM   Result Value Ref Range    WBC 12.7 (H) 3.6 - 11.0 K/uL    RBC 2.37 (L) 3.80 - 5.20 M/uL    HGB 6.7 (L) 11.5 - 16.0 g/dL    HCT 20.5 (L) 35.0 - 47.0 %    MCV 86.5 80.0 - 99.0 FL    MCH 28.3 26.0 - 34.0 PG    MCHC 32.7 30.0 - 36.5 g/dL    RDW 19.2 (H) 11.5 - 14.5 %    PLATELET 475 020 - 055 K/uL    MPV 10.2 8.9 - 12.9 FL    NRBC 6.8 (H) 0.0  WBC    ABSOLUTE NRBC 0.86 (H) 0.00 - 0.01 K/uL    NEUTROPHILS 85 (H) 32 - 75 %    LYMPHOCYTES 9 (L) 12 - 49 %    MONOCYTES 6 5 - 13 %    EOSINOPHILS 0 0 - 7 %    BASOPHILS 0 0 - 1 %    IMMATURE GRANULOCYTES 0 %    ABS. NEUTROPHILS 10.8 (H) 1.8 - 8.0 K/UL    ABS. LYMPHOCYTES 1.1 0.8 - 3.5 K/UL    ABS. MONOCYTES 0.8 0.0 - 1.0 K/UL    ABS. EOSINOPHILS 0.0 0.0 - 0.4 K/UL    ABS. BASOPHILS 0.0 0.0 - 0.1 K/UL    ABS. IMM.  GRANS. 0.0 K/UL    DF Smear Scanned      RBC COMMENTS Anisocytosis  2+        RBC COMMENTS Polychromasia  1+        RBC COMMENTS Target Cells  1+        RBC COMMENTS Macrocytosis  1+       C REACTIVE PROTEIN, QT    Collection Time: 03/18/23  2:49 AM   Result Value Ref Range    C-Reactive protein 9.22 (H) 0.00 - 0.60 mg/dL   SED RATE (ESR)    Collection Time: 03/18/23  2:49 AM   Result Value Ref Range    Sed rate, automated 62 (H) 0 - 30 mm/hr   RENAL FUNCTION PANEL    Collection Time: 03/18/23  2:49 AM   Result Value Ref Range    Sodium 134 (L) 136 - 145 mmol/L    Potassium 4.2 3.5 - 5.1 mmol/L    Chloride 97 97 - 108 mmol/L    CO2 27 21 - 32 mmol/L    Anion gap 10 5 - 15 mmol/L    Glucose 63 (L) 65 - 100 mg/dL    BUN 47 (H) 6 - 20 mg/dL    Creatinine 2.68 (H) 0.55 - 1.02 mg/dL    BUN/Creatinine ratio 18 12 - 20      eGFR 19 (L) >60 ml/min/1.73m2    Calcium 8.3 (L) 8.5 - 10.1 mg/dL    Phosphorus 4.0 2.6 - 4.7 mg/dL    Albumin 2.1 (L) 3.5 - 5.0 g/dL   TSH 3RD GENERATION    Collection Time: 03/18/23  2:49 AM   Result Value Ref Range    TSH 0.45 0.36 - 3.74 uIU/mL   LACTIC ACID    Collection Time: 03/18/23  2:49 AM   Result Value Ref Range    Lactic acid 5.5 (HH) 0.4 - 2.0 mmol/L   GLUCOSE, POC    Collection Time: 03/18/23  3:32 AM   Result Value Ref Range    Glucose (POC) 46 (LL) 65 - 100 mg/dL    Performed by Chace Dolan, POC    Collection Time: 03/18/23  4:48 AM   Result Value Ref Range    Glucose (POC) 56 (L) 65 - 100 mg/dL    Performed by Elis Crabtree    GLUCOSE, POC    Collection Time: 03/18/23  5:29 AM   Result Value Ref Range    Glucose (POC) 154 (H) 65 - 100 mg/dL    Performed by Elis Crabtree    GLUCOSE, POC    Collection Time: 03/18/23  6:34 AM   Result Value Ref Range    Glucose (POC) 150 (H) 65 - 100 mg/dL    Performed by Chace Dolan, POC    Collection Time: 03/18/23  8:48 AM   Result Value Ref Range    Glucose (POC) 74 65 - 100 mg/dL    Performed by Gertrude Pleitez, TROUGH    Collection Time: 03/18/23 11:15 AM   Result Value Ref Range    Vancomycin,trough 12.6 (H) 5.0 - 10.0 ug/mL   HGB & HCT    Collection Time: 03/18/23 11:15 AM   Result Value Ref Range    HGB 6.7 (L) 11.5 - 16.0 g/dL    HCT 20.6 (L) 35.0 - 47.0 %   GLUCOSE, POC    Collection Time: 03/18/23 11:17 AM   Result Value Ref Range    Glucose (POC) 70 65 - 100 mg/dL    Performed by Agueda Gordon        CT CHEST WO CONT   Final Result   Moderate right pleural fluid collection with air-fluid level and numerous   nondependent air bubbles compatible with the patient's history of empyema. Underlying pulmonary consolidation. XR CHEST PORT   Final Result   No acute process. Cardiomegaly. Intake and Output:  Current Shift: No intake/output data recorded. Last three shifts: 03/16 1901 - 03/18 0700  In: 1025 [I.V.:750]  Out: -       Lab/Data Review:  Recent Labs     03/18/23  1115 03/18/23  0249 03/17/23  1209   WBC  --  12.7* 13.1*   HGB 6.7* 6.7* 6.2*   HCT 20.6* 20.5* 19.8*   PLT  --  205 269     Recent Labs     03/18/23  0249 03/17/23  1209   * 135*   K 4.2 5.0   CL 97 95*   CO2 27 22   GLU 63* 25*   BUN 47* 54*   CREA 2.68* 3.41*   CA 8.3* 8.4*   MG  --  2.1   PHOS 4.0  --    ALB 2.1* 2.0*   TBILI  --  1.1*   ALT  --  17     No results for input(s): PH, PCO2, PO2, HCO3, FIO2 in the last 72 hours.   Recent Results (from the past 24 hour(s))   TYPE & SCREEN    Collection Time: 03/17/23  4:00 PM   Result Value Ref Range    Crossmatch Expiration 03/20/2023,2359     ABO/Rh(D) O Positive     Antibody screen Negative     Unit number [id number]     Blood component type  LR     Unit division 00     Status of unit Issued,final     TRANSFUSION STATUS Ok to transfuse     Crossmatch result Compatible    CULTURE, BLOOD    Collection Time: 03/17/23  4:00 PM    Specimen: Blood   Result Value Ref Range    Special Requests: No Special Requests      Culture result: No growth after 15 hours     GLUCOSE, POC    Collection Time: 03/17/23  4:04 PM   Result Value Ref Range    Glucose (POC) 96 65 - 100 mg/dL    Performed by Kaleb Mesa    COVID-19 RAPID TEST    Collection Time: 03/18/23  1:58 AM   Result Value Ref Range    COVID-19 rapid test Not Detected Not Detected     INFLUENZA A & B AG (RAPID TEST)    Collection Time: 03/18/23  1:58 AM   Result Value Ref Range    Influenza A Antigen Negative Negative      Influenza B Antigen Negative Negative PROCALCITONIN    Collection Time: 03/18/23  2:29 AM   Result Value Ref Range    Procalcitonin 8.31 (H) 0 ng/mL   CULTURE, BLOOD, PAIRED    Collection Time: 03/18/23  2:29 AM    Specimen: Blood   Result Value Ref Range    Special Requests: No Special Requests      Culture result: No growth after 5 hours     CHEM8,LACTIC ACID,POC    Collection Time: 03/18/23  2:38 AM   Result Value Ref Range    Glucose, POC 56 (L) 65 - 100 mg/dL    eGFR (POC) Not calculated >60 ml/min/1.73m2    Lactic Acid (POC) 5.48 (HH) 0.40 - 2.00 mmol/L    Sample source Venous      Performed by Gaurav Arteaga     Critical value read back BRETT    CBC WITH AUTOMATED DIFF    Collection Time: 03/18/23  2:49 AM   Result Value Ref Range    WBC 12.7 (H) 3.6 - 11.0 K/uL    RBC 2.37 (L) 3.80 - 5.20 M/uL    HGB 6.7 (L) 11.5 - 16.0 g/dL    HCT 20.5 (L) 35.0 - 47.0 %    MCV 86.5 80.0 - 99.0 FL    MCH 28.3 26.0 - 34.0 PG    MCHC 32.7 30.0 - 36.5 g/dL    RDW 19.2 (H) 11.5 - 14.5 %    PLATELET 851 146 - 831 K/uL    MPV 10.2 8.9 - 12.9 FL    NRBC 6.8 (H) 0.0  WBC    ABSOLUTE NRBC 0.86 (H) 0.00 - 0.01 K/uL    NEUTROPHILS 85 (H) 32 - 75 %    LYMPHOCYTES 9 (L) 12 - 49 %    MONOCYTES 6 5 - 13 %    EOSINOPHILS 0 0 - 7 %    BASOPHILS 0 0 - 1 %    IMMATURE GRANULOCYTES 0 %    ABS. NEUTROPHILS 10.8 (H) 1.8 - 8.0 K/UL    ABS. LYMPHOCYTES 1.1 0.8 - 3.5 K/UL    ABS. MONOCYTES 0.8 0.0 - 1.0 K/UL    ABS. EOSINOPHILS 0.0 0.0 - 0.4 K/UL    ABS. BASOPHILS 0.0 0.0 - 0.1 K/UL    ABS. IMM.  GRANS. 0.0 K/UL    DF Smear Scanned      RBC COMMENTS Anisocytosis  2+        RBC COMMENTS Polychromasia  1+        RBC COMMENTS Target Cells  1+        RBC COMMENTS Macrocytosis  1+       C REACTIVE PROTEIN, QT    Collection Time: 03/18/23  2:49 AM   Result Value Ref Range    C-Reactive protein 9.22 (H) 0.00 - 0.60 mg/dL   SED RATE (ESR)    Collection Time: 03/18/23  2:49 AM   Result Value Ref Range    Sed rate, automated 62 (H) 0 - 30 mm/hr   RENAL FUNCTION PANEL    Collection Time: 03/18/23  2:49 AM   Result Value Ref Range    Sodium 134 (L) 136 - 145 mmol/L    Potassium 4.2 3.5 - 5.1 mmol/L    Chloride 97 97 - 108 mmol/L    CO2 27 21 - 32 mmol/L    Anion gap 10 5 - 15 mmol/L    Glucose 63 (L) 65 - 100 mg/dL    BUN 47 (H) 6 - 20 mg/dL    Creatinine 2.68 (H) 0.55 - 1.02 mg/dL    BUN/Creatinine ratio 18 12 - 20      eGFR 19 (L) >60 ml/min/1.73m2    Calcium 8.3 (L) 8.5 - 10.1 mg/dL    Phosphorus 4.0 2.6 - 4.7 mg/dL    Albumin 2.1 (L) 3.5 - 5.0 g/dL   TSH 3RD GENERATION    Collection Time: 03/18/23  2:49 AM   Result Value Ref Range    TSH 0.45 0.36 - 3.74 uIU/mL   LACTIC ACID    Collection Time: 03/18/23  2:49 AM   Result Value Ref Range    Lactic acid 5.5 (HH) 0.4 - 2.0 mmol/L   GLUCOSE, POC    Collection Time: 03/18/23  3:32 AM   Result Value Ref Range    Glucose (POC) 46 (LL) 65 - 100 mg/dL    Performed by Ester Amador, POC    Collection Time: 03/18/23  4:48 AM   Result Value Ref Range    Glucose (POC) 56 (L) 65 - 100 mg/dL    Performed by Naz Peña    GLUCOSE, POC    Collection Time: 03/18/23  5:29 AM   Result Value Ref Range    Glucose (POC) 154 (H) 65 - 100 mg/dL    Performed by Naz Peña    GLUCOSE, POC    Collection Time: 03/18/23  6:34 AM   Result Value Ref Range    Glucose (POC) 150 (H) 65 - 100 mg/dL    Performed by Ester Amador, POC    Collection Time: 03/18/23  8:48 AM   Result Value Ref Range    Glucose (POC) 74 65 - 100 mg/dL    Performed by Claude Omalley, TROUGH    Collection Time: 03/18/23 11:15 AM   Result Value Ref Range    Vancomycin,trough 12.6 (H) 5.0 - 10.0 ug/mL   HGB & HCT    Collection Time: 03/18/23 11:15 AM   Result Value Ref Range    HGB 6.7 (L) 11.5 - 16.0 g/dL    HCT 20.6 (L) 35.0 - 47.0 %   GLUCOSE, POC    Collection Time: 03/18/23 11:17 AM   Result Value Ref Range    Glucose (POC) 70 65 - 100 mg/dL    Performed by Tyler Mayorga        ______________________________________________________________________________    Thera Leaks Maritza Ordoñez NP    This is dictation was done by dragon, computer voice recognition software. Quite often unanticipated grammatical, syntax, homophones and other interpretive errors or inadvertently transcribed by the computer software. Please excuse errors that have escaped final proofreading. Thank you.

## 2023-03-18 NOTE — ED NOTES
Spoke with patients son about possibly discharging patient home after the blood transfusion, son voiced concerns about patient living at home alone. Spoke with ED provider who will reassess patient.

## 2023-03-18 NOTE — PROGRESS NOTES
Problem: Falls - Risk of  Goal: *Absence of Falls  Description: Document Dennie Rous Fall Risk and appropriate interventions in the flowsheet. Outcome: Progressing Towards Goal  Note: Fall Risk Interventions:  Mobility Interventions: Bed/chair exit alarm         Medication Interventions: Bed/chair exit alarm, Evaluate medications/consider consulting pharmacy    Elimination Interventions: Call light in reach, Bed/chair exit alarm              Problem: Patient Education: Go to Patient Education Activity  Goal: Patient/Family Education  Outcome: Progressing Towards Goal     Problem: Pressure Injury - Risk of  Goal: *Prevention of pressure injury  Description: Document Isak Scale and appropriate interventions in the flowsheet.   Outcome: Progressing Towards Goal  Note: Pressure Injury Interventions:  Sensory Interventions: Assess changes in LOC, Keep linens dry and wrinkle-free, Minimize linen layers, Pressure redistribution bed/mattress (bed type)    Moisture Interventions: Absorbent underpads, Apply protective barrier, creams and emollients    Activity Interventions: Increase time out of bed, Pressure redistribution bed/mattress(bed type), PT/OT evaluation    Mobility Interventions: Pressure redistribution bed/mattress (bed type), PT/OT evaluation    Nutrition Interventions: Document food/fluid/supplement intake    Friction and Shear Interventions: Apply protective barrier, creams and emollients, Minimize layers                Problem: Patient Education: Go to Patient Education Activity  Goal: Patient/Family Education  Outcome: Progressing Towards Goal

## 2023-03-18 NOTE — ED NOTES
TRANSFER - OUT REPORT:    Verbal report given to Aria(name) at bedside on Caleb Michaud  being transferred to ICU(unit) for routine progression of care       Report consisted of patients Situation, Background, Assessment and   Recommendations(SBAR). Information from the following report(s) SBAR, Kardex, ED Summary, MAR, Accordion, and Recent Results was reviewed with the receiving nurse. Lines:   Peripheral IV 03/17/23 Anterior;Right Forearm (Active)       Peripheral IV 03/18/23 Distal;Posterior;Right Forearm (Active)        Opportunity for questions and clarification was provided.       Patient transported with:   Registered Nurse

## 2023-03-19 VITALS
HEART RATE: 80 BPM | RESPIRATION RATE: 24 BRPM | TEMPERATURE: 97.6 F | HEIGHT: 59 IN | DIASTOLIC BLOOD PRESSURE: 62 MMHG | SYSTOLIC BLOOD PRESSURE: 90 MMHG | BODY MASS INDEX: 23.18 KG/M2 | OXYGEN SATURATION: 99 % | WEIGHT: 115 LBS

## 2023-03-19 LAB
ABO + RH BLD: NORMAL
ACC. NO. FROM MICRO ORDER, ACCP: ABNORMAL
ACINETOBACTER CALCOACETICUS-BAUMANII COMPLEX, ACBCX: NOT DETECTED
ALBUMIN SERPL-MCNC: 1.8 G/DL (ref 3.5–5)
ALBUMIN/GLOB SERPL: 0.4 (ref 1.1–2.2)
ALP SERPL-CCNC: 120 U/L (ref 45–117)
ALT SERPL-CCNC: 15 U/L (ref 12–78)
ANION GAP SERPL CALC-SCNC: 7 MMOL/L (ref 5–15)
AST SERPL W P-5'-P-CCNC: 29 U/L (ref 15–37)
BACTEROIDES FRAGILIS, BFRA: NOT DETECTED
BASOPHILS # BLD: 0 K/UL (ref 0–0.1)
BASOPHILS NFR BLD: 0 % (ref 0–1)
BILIRUB SERPL-MCNC: 0.8 MG/DL (ref 0.2–1)
BIOFIRE COMMENT, BCIDPF: ABNORMAL
BLD PROD TYP BPU: NORMAL
BLD PROD TYP BPU: NORMAL
BLOOD GROUP ANTIBODIES SERPL: NEGATIVE
BPU ID: NORMAL
BPU ID: NORMAL
BUN SERPL-MCNC: 60 MG/DL (ref 6–20)
BUN/CREAT SERPL: 18 (ref 12–20)
C GLABRATA DNA VAG QL NAA+PROBE: NOT DETECTED
CA-I BLD-MCNC: 8 MG/DL (ref 8.5–10.1)
CANDIDA ALBICANS: NOT DETECTED
CANDIDA AURIS, CAAU: NOT DETECTED
CANDIDA KRUSEI, CKRP: NOT DETECTED
CANDIDA PARAPSILOSIS, CPAUP: NOT DETECTED
CANDIDA TROPICALIS, CTROP: NOT DETECTED
CHLORIDE SERPL-SCNC: 95 MMOL/L (ref 97–108)
CO2 SERPL-SCNC: 29 MMOL/L (ref 21–32)
CREAT SERPL-MCNC: 3.25 MG/DL (ref 0.55–1.02)
CROSSMATCH RESULT,%XM: NORMAL
CROSSMATCH RESULT,%XM: NORMAL
CRP SERPL-MCNC: 6.02 MG/DL (ref 0–0.6)
CRYPTO NEOFORMANS/GATTII, CRYNEG: NOT DETECTED
DIFFERENTIAL METHOD BLD: ABNORMAL
ENTEROBACTER CLOACAE COMPLEX, ECCP: NOT DETECTED
ENTEROBACTERALES SP. , ENBLS: NOT DETECTED
ENTEROCOCCUS FAECALIS, ENFA: NOT DETECTED
ENTEROCOCCUS FAECIUM, ENFAM: NOT DETECTED
EOSINOPHIL # BLD: 0 K/UL (ref 0–0.4)
EOSINOPHIL NFR BLD: 0 % (ref 0–7)
ERYTHROCYTE [DISTWIDTH] IN BLOOD BY AUTOMATED COUNT: 18.2 % (ref 11.5–14.5)
ESCHERICHIA COLI: NOT DETECTED
GLOBULIN SER CALC-MCNC: 4.6 G/DL (ref 2–4)
GLUCOSE BLD STRIP.AUTO-MCNC: 120 MG/DL (ref 65–100)
GLUCOSE BLD STRIP.AUTO-MCNC: 138 MG/DL (ref 65–100)
GLUCOSE BLD STRIP.AUTO-MCNC: 89 MG/DL (ref 65–100)
GLUCOSE SERPL-MCNC: 91 MG/DL (ref 65–100)
HAEMOPHILUS INFLUENZAE, HMI: NOT DETECTED
HCT VFR BLD AUTO: 22.5 % (ref 35–47)
HGB BLD-MCNC: 7.7 G/DL (ref 11.5–16)
IMM GRANULOCYTES # BLD AUTO: 0.1 K/UL (ref 0–0.04)
IMM GRANULOCYTES NFR BLD AUTO: 1 % (ref 0–0.5)
KLEBSIELLA AEROGENES, KLAE: NOT DETECTED
KLEBSIELLA OXYTOCA: NOT DETECTED
KLEBSIELLA PNEUMONIAE GROUP, KPPG: NOT DETECTED
LISTERIA MONOCYTOGENES, LMONP: NOT DETECTED
LYMPHOCYTES # BLD: 1.2 K/UL (ref 0.8–3.5)
LYMPHOCYTES NFR BLD: 11 % (ref 12–49)
MCH RBC QN AUTO: 28.7 PG (ref 26–34)
MCHC RBC AUTO-ENTMCNC: 34.2 G/DL (ref 30–36.5)
MCV RBC AUTO: 84 FL (ref 80–99)
MONOCYTES # BLD: 0.6 K/UL (ref 0–1)
MONOCYTES NFR BLD: 5 % (ref 5–13)
NEISSERIA MENINGITIDIS, NMNI: NOT DETECTED
NEUTS SEG # BLD: 9.2 K/UL (ref 1.8–8)
NEUTS SEG NFR BLD: 83 % (ref 32–75)
NRBC # BLD: 0.81 K/UL (ref 0–0.01)
NRBC BLD-RTO: 7.3 PER 100 WBC
PERFORMED BY, TECHID: ABNORMAL
PERFORMED BY, TECHID: ABNORMAL
PERFORMED BY, TECHID: NORMAL
PLATELET # BLD AUTO: 177 K/UL (ref 150–400)
PMV BLD AUTO: 10.4 FL (ref 8.9–12.9)
POTASSIUM SERPL-SCNC: 3.8 MMOL/L (ref 3.5–5.1)
PROCALCITONIN SERPL-MCNC: 8.69 NG/ML
PROT SERPL-MCNC: 6.4 G/DL (ref 6.4–8.2)
PROTEUS, PRP: NOT DETECTED
PSEUDOMONAS AERUGINOSA: NOT DETECTED
RBC # BLD AUTO: 2.68 M/UL (ref 3.8–5.2)
RBC MORPH BLD: ABNORMAL
RESISTANT GENE SPACE, REGENE: ABNORMAL
SALMONELLA, SALMO: NOT DETECTED
SERRATIA MARCESCENS: NOT DETECTED
SODIUM SERPL-SCNC: 131 MMOL/L (ref 136–145)
SPECIMEN EXP DATE BLD: NORMAL
STAPH EPIDERMIDIS, STEP: NOT DETECTED
STAPH LUGDUNENSIS, STALUG: NOT DETECTED
STAPHYLOCOCCUS AUREUS: NOT DETECTED
STAPHYLOCOCCUS, STAPP: DETECTED
STATUS OF UNIT,%ST: NORMAL
STATUS OF UNIT,%ST: NORMAL
STENO MALTOPHILIA, STMA: NOT DETECTED
STREPTOCOCCUS , STPSP: NOT DETECTED
STREPTOCOCCUS AGALACTIAE (GROUP B): NOT DETECTED
STREPTOCOCCUS PNEUMONIAE , SPNP: NOT DETECTED
STREPTOCOCCUS PYOGENES (GROUP A), SPYOP: NOT DETECTED
TRANSFUSION STATUS PATIENT QL: NORMAL
TRANSFUSION STATUS PATIENT QL: NORMAL
UNIT DIVISION, %UDIV: 0
UNIT DIVISION, %UDIV: 0
VANCOMYCIN TROUGH SERPL-MCNC: 19.5 UG/ML (ref 5–10)
WBC # BLD AUTO: 11.1 K/UL (ref 3.6–11)

## 2023-03-19 PROCEDURE — 36415 COLL VENOUS BLD VENIPUNCTURE: CPT

## 2023-03-19 PROCEDURE — 86140 C-REACTIVE PROTEIN: CPT

## 2023-03-19 PROCEDURE — P9047 ALBUMIN (HUMAN), 25%, 50ML: HCPCS | Performed by: NURSE PRACTITIONER

## 2023-03-19 PROCEDURE — 74011250637 HC RX REV CODE- 250/637: Performed by: NURSE PRACTITIONER

## 2023-03-19 PROCEDURE — 99222 1ST HOSP IP/OBS MODERATE 55: CPT | Performed by: COLON & RECTAL SURGERY

## 2023-03-19 PROCEDURE — 99233 SBSQ HOSP IP/OBS HIGH 50: CPT | Performed by: INTERNAL MEDICINE

## 2023-03-19 PROCEDURE — 82962 GLUCOSE BLOOD TEST: CPT

## 2023-03-19 PROCEDURE — 74011000250 HC RX REV CODE- 250: Performed by: HOSPITALIST

## 2023-03-19 PROCEDURE — 74011000250 HC RX REV CODE- 250: Performed by: COLON & RECTAL SURGERY

## 2023-03-19 PROCEDURE — 74011250637 HC RX REV CODE- 250/637: Performed by: HOSPITALIST

## 2023-03-19 PROCEDURE — 74011250636 HC RX REV CODE- 250/636: Performed by: NURSE PRACTITIONER

## 2023-03-19 PROCEDURE — 74011000250 HC RX REV CODE- 250: Performed by: EMERGENCY MEDICINE

## 2023-03-19 PROCEDURE — 85025 COMPLETE CBC W/AUTO DIFF WBC: CPT

## 2023-03-19 PROCEDURE — 80053 COMPREHEN METABOLIC PANEL: CPT

## 2023-03-19 PROCEDURE — 74011000258 HC RX REV CODE- 258: Performed by: NURSE PRACTITIONER

## 2023-03-19 PROCEDURE — 80202 ASSAY OF VANCOMYCIN: CPT

## 2023-03-19 PROCEDURE — 84145 PROCALCITONIN (PCT): CPT

## 2023-03-19 RX ORDER — ALBUMIN HUMAN 250 G/1000ML
12.5 SOLUTION INTRAVENOUS EVERY 6 HOURS
Status: DISCONTINUED | OUTPATIENT
Start: 2023-03-19 | End: 2023-03-20 | Stop reason: HOSPADM

## 2023-03-19 RX ORDER — MIDODRINE HYDROCHLORIDE 5 MG/1
10 TABLET ORAL
Status: DISCONTINUED | OUTPATIENT
Start: 2023-03-19 | End: 2023-03-20 | Stop reason: HOSPADM

## 2023-03-19 RX ORDER — NOREPINEPHRINE BIT/0.9 % NACL 8 MG/250ML
.5-3 INFUSION BOTTLE (ML) INTRAVENOUS
Status: DISCONTINUED | OUTPATIENT
Start: 2023-03-19 | End: 2023-03-20 | Stop reason: HOSPADM

## 2023-03-19 RX ORDER — DEXTROSE MONOHYDRATE AND SODIUM CHLORIDE 5; .9 G/100ML; G/100ML
75 INJECTION, SOLUTION INTRAVENOUS CONTINUOUS
Status: DISCONTINUED | OUTPATIENT
Start: 2023-03-19 | End: 2023-03-20 | Stop reason: HOSPADM

## 2023-03-19 RX ADMIN — Medication 4 MCG/MIN: at 17:58

## 2023-03-19 RX ADMIN — MIDODRINE HYDROCHLORIDE 10 MG: 5 TABLET ORAL at 18:04

## 2023-03-19 RX ADMIN — DEXTROSE AND SODIUM CHLORIDE 75 ML/HR: 5; 900 INJECTION, SOLUTION INTRAVENOUS at 12:28

## 2023-03-19 RX ADMIN — SODIUM CHLORIDE, PRESERVATIVE FREE 10 ML: 5 INJECTION INTRAVENOUS at 05:21

## 2023-03-19 RX ADMIN — ALBUMIN (HUMAN) 12.5 G: 0.25 INJECTION, SOLUTION INTRAVENOUS at 18:05

## 2023-03-19 RX ADMIN — MIDODRINE HYDROCHLORIDE 10 MG: 5 TABLET ORAL at 12:21

## 2023-03-19 RX ADMIN — ALBUMIN (HUMAN) 12.5 G: 0.25 INJECTION, SOLUTION INTRAVENOUS at 12:21

## 2023-03-19 RX ADMIN — CARVEDILOL 12.5 MG: 12.5 TABLET, FILM COATED ORAL at 08:17

## 2023-03-19 RX ADMIN — ACETAMINOPHEN 650 MG: 325 TABLET ORAL at 08:17

## 2023-03-19 RX ADMIN — DEXTROSE MONOHYDRATE 50 ML/HR: 50 INJECTION, SOLUTION INTRAVENOUS at 07:40

## 2023-03-19 RX ADMIN — DRONABINOL 10 MG: 2.5 CAPSULE ORAL at 08:17

## 2023-03-19 RX ADMIN — DOCUSATE SODIUM 250 MG: 50 LIQUID ORAL at 08:16

## 2023-03-19 NOTE — PROGRESS NOTES
Hospitalist Progress Note            Daily Progress Note: 3/19/2023 9:38 AM  Hospital course:     Elizabeth Delgado is  70 y.o. female with a history of end-stage renal disease on hemodialysis, hypertension, heart failure with reduced ejection fraction is brought to the emergency room due to shortness of breath that is worse from baseline. She is tachypneic and tachycardic, was evaluated in the emergency room, seen by nephrology and taken to the dialysis as she admits that might have been causing the issues. She returned from the dialysis, still tachycardic. On evaluation she looks cachectic and very tired. On evaluation by the emergency room physician patient is still tachypneic and tachycardic. Also she is very lethargic with change in mental status alert with more of lethargy. Lives alone at home. On recent admission to the hospital she is found with pleural effusion, had thoracentesis done by interventional radiology, they documented it was empyema with the typical fluid blocking the needle, malodorous. Sent for cultures. Also found with a lot of gas that was released during the procedure. The patient was discharged prior to results of cultures with Staphylococcus. Not discharged on antibiotics. Chest x-ray today suggestive of right base loss of volume,CT of the chest, which came back as empyema. Started on antibiotics IV vancomycin and cefepime. ID consultation for recommendations of antibiotics, she may need  long-term antibiotic. Also need a consultation for evaluation if she needed any decortication. She was admitted to the ICU for close monitoring. ID and nephrology were consulted. Received the patient on 3/18 review of ED findings significant labs on admission hemoglobin 6.7, WBC 12.7, sodium 134, creatinine 2.68, BUN 47. Will transfuse 1 unit PRBC. We will monitor laboratory values closely for any further declines in hemoglobin or elevations in WBC.   Currently poor oral intake with point-of-care testing in the 70s. We will start gentle D5 infusion at 50 cc an hour until intake has improved to prevent hypoglycemia    3/18 ID recommending IV vancomycin to continue and a consult placed with general surgery for possible chest tube placement. 3/19 General surgery evaluated the patient, known empyema dating back to 2/27 recommending transfer for cardiothoracic surgery for likely VATS procedure for empyema. Blood pressures remaining fairly low 80s over 60s albumin noted at 1.8 we will give albumin replacement and started on midodrine for blood pressure support. Subjective: Follow-up examination of patient at bedside. She is resting comfortably without complaints lethargic but arousable. Appears very weak and frail. Currently on room air. Assessment/Plan:   Active Problems:    Altered mental status (3/18/2023)      Anemia (3/18/2023)      Tachycardia (3/18/2023)      Sinus tachycardia (7/17/3867)    Metabolic encephalopathy: Improving  -Etiology most likely infectious versus from end-stage renal disease. Empyema right lower lobe:   -ID consulted will request consultation from infectious disease, we have cultures with the -Sensitivities available from the last admission.  -Per ID Granulicatella are also streptococci and like Streptococcus oralis/mitalis, are part of the oral joshua, which when aspirated can result in severe lung infection. Both are susceptible to Vancomycin  -Consult placed with general surgery for possible chest tube placement, recommending transfer for cardiothoracic surgery patient will likely require VATS procedure for empyema    Sinus tachycardia:   -Likely from decompensation due to her general condition and infectious etiology.      Mild respiratory distress on admission:   -On oxygen nasal cannula weaned off     Failure to thrive:   -Secondary to multiple comorbid conditions and chronic diseases.  -Albumin 1.8 we will give replacement 12.5 every 6 hours x2 days     End-stage renal disease:   -On hemodialysis, already seen and evaluated by nephrology. Anemia  -Hemoglobin 6.7 transfuse 2 unit PRBC  -Monitor CBC closely  -Transfuse PRBC for hemoglobin 7.0 or less    Failure to thrive  Poor oral intake  - Gentle IV fluid D5 50 an hour until oral intake improves  -Monitor point-of-care to avoid hypoglycemia  -Marinol initiated to improve appetite    Hypotension  -Start midodrine 10 mg 3 times daily with hold parameters  -Started on albumin replacement therapy         Admitted to ICU, full CODE STATUS, cannot make decisions at this time due to her decreased mentation. Home medications reviewed with external Rx history. MDM  Number of Diagnoses or Management Options  ESRD on dialysis [facility]): established, improving  SOB (shortness of breath): new, needed workup  Diagnosis management comments: ID has been consulted for positive cultures from previous admission not treated with antibiotics upon that discharge. Recommending IV vancomycin and surgery consult for chest tube placement       Amount and/or Complexity of Data Reviewed  Clinical lab tests: ordered and reviewed  Discussion of test results with the performing providers: yes  Discuss the patient with other providers: yes (For general surgery patient requires level of care not available at this facility. Patient will require decortication, will reach out to [facility] or [facility] for possible transfer)    Risk of Complications, Morbidity, and/or Mortality  Presenting problems: high  Management options: high    Critical Care  Total time providing critical care: 30-74 minutes    Patient Progress  Patient progress: stable (Currently stable but is high risk for decline.   May need transfer for further management by cardiothoracic surgeon for decortication)       DVT Prophylaxis: SCDs  Code Status: Full Code  POA/NOK:        Disposition and discharge barriers:   Currently requiring ICU care  IV antibiotics  ID consult  General surgery consult-recommending transfer for cardiothoracic surgery      Current Facility-Administered Medications   Medication Dose Route Frequency    carvediloL (COREG) tablet 12.5 mg  12.5 mg Oral BID    sodium chloride (NS) flush 5-40 mL  5-40 mL IntraVENous Q8H    sodium chloride (NS) flush 5-40 mL  5-40 mL IntraVENous PRN    acetaminophen (TYLENOL) tablet 650 mg  650 mg Oral Q6H PRN    Or    acetaminophen (TYLENOL) suppository 650 mg  650 mg Rectal Q6H PRN    ondansetron (ZOFRAN ODT) tablet 4 mg  4 mg Oral Q6H PRN    Or    ondansetron (ZOFRAN) injection 4 mg  4 mg IntraVENous Q6H PRN    sodium chloride (NS) flush 5-10 mL  5-10 mL IntraVENous PRN    docusate (COLACE) 50 mg/5 mL oral liquid 250 mg  250 mg Oral DAILY    Vancomycin Pharmacy Dosing   Other Rx Dosing/Monitoring    dextrose 5% infusion  50 mL/hr IntraVENous CONTINUOUS    0.9% sodium chloride infusion 250 mL  250 mL IntraVENous PRN    dronabinoL (MARINOL) capsule 10 mg  10 mg Oral BID    0.9% sodium chloride infusion 250 mL  250 mL IntraVENous PRN    dextrose 10% infusion 0-250 mL  0-250 mL IntraVENous PRN    0.9% sodium chloride infusion 250 mL  250 mL IntraVENous PRN    lidocaine 4 % patch 1 Patch  1 Patch TransDERmal Q24H        REVIEW OF SYSTEMS    Review of Systems   Constitutional:  Positive for malaise/fatigue. Respiratory:  Negative for shortness of breath. Cardiovascular:  Negative for chest pain. Musculoskeletal:  Positive for myalgias. Neurological:  Positive for weakness. Psychiatric/Behavioral:  The patient is nervous/anxious. Objective:     Visit Vitals  BP 98/77   Pulse 85   Temp 97.6 °F (36.4 °C)   Resp 19   Ht 4' 11\" (1.499 m)   Wt 52.2 kg (115 lb)   SpO2 98%   BMI 23.23 kg/m²      O2 Device: None (Room air)    Temp (24hrs), Av.6 °F (36.4 °C), Min:96.1 °F (35.6 °C), Max:98.2 °F (36.8 °C)        PHYSICAL EXAM:    Physical Exam  Constitutional:       Appearance: She is ill-appearing.       Comments: Thin and frail   Cardiovascular:      Rate and Rhythm: Regular rhythm. Tachycardia present. Pulmonary:      Effort: No respiratory distress. Comments: Decreased breath sounds on right  Abdominal:      General: There is no distension. Musculoskeletal:      Right lower leg: No edema. Left lower leg: No edema. Skin:     General: Skin is dry. Neurological:      Mental Status: She is oriented to person, place, and time. Motor: Weakness present. Gait: Gait abnormal.        Data Review    Recent Results (from the past 24 hour(s))   VANCOMYCIN, TROUGH    Collection Time: 03/18/23 11:15 AM   Result Value Ref Range    Vancomycin,trough 12.6 (H) 5.0 - 10.0 ug/mL   HGB & HCT    Collection Time: 03/18/23 11:15 AM   Result Value Ref Range    HGB 6.7 (L) 11.5 - 16.0 g/dL    HCT 20.6 (L) 35.0 - 47.0 %   GLUCOSE, POC    Collection Time: 03/18/23 11:17 AM   Result Value Ref Range    Glucose (POC) 70 65 - 100 mg/dL    Performed by [address], POC    Collection Time: 03/18/23  3:34 PM   Result Value Ref Range    Glucose (POC) 77 65 - 100 mg/dL    Performed by Laurita Archuleta    HGB & HCT    Collection Time: 03/18/23  9:04 PM   Result Value Ref Range    HGB 6.7 (L) 11.5 - 16.0 g/dL    HCT 19.7 (L) 35.0 - 03.1 %   METABOLIC PANEL, COMPREHENSIVE    Collection Time: 03/19/23  3:30 AM   Result Value Ref Range    Sodium 131 (L) 136 - 145 mmol/L    Potassium 3.8 3.5 - 5.1 mmol/L    Chloride 95 (L) 97 - 108 mmol/L    CO2 29 21 - 32 mmol/L    Anion gap 7 5 - 15 mmol/L    Glucose 91 65 - 100 mg/dL    BUN 60 (H) 6 - 20 mg/dL    Creatinine 3.25 (H) 0.55 - 1.02 mg/dL    BUN/Creatinine ratio 18 12 - 20      eGFR 15 (L) >60 ml/min/1.73m2    Calcium 8.0 (L) 8.5 - 10.1 mg/dL    Bilirubin, total 0.8 0.2 - 1.0 mg/dL    AST (SGOT) 29 15 - 37 U/L    ALT (SGPT) 15 12 - 78 U/L    Alk.  phosphatase 120 (H) 45 - 117 U/L    Protein, total 6.4 6.4 - 8.2 g/dL    Albumin 1.8 (L) 3.5 - 5.0 g/dL    Globulin 4.6 (H) 2.0 - 4.0 g/dL    A-G Ratio 0.4 (L) 1.1 - 2.2     CBC WITH AUTOMATED DIFF    Collection Time: 03/19/23  3:30 AM   Result Value Ref Range    WBC 11.1 (H) 3.6 - 11.0 K/uL    RBC 2.68 (L) 3.80 - 5.20 M/uL    HGB 7.7 (L) 11.5 - 16.0 g/dL    HCT 22.5 (L) 35.0 - 47.0 %    MCV 84.0 80.0 - 99.0 FL    MCH 28.7 26.0 - 34.0 PG    MCHC 34.2 30.0 - 36.5 g/dL    RDW 18.2 (H) 11.5 - 14.5 %    PLATELET 397 017 - 912 K/uL    MPV 10.4 8.9 - 12.9 FL    NRBC 7.3 (H) 0.0  WBC    ABSOLUTE NRBC 0.81 (H) 0.00 - 0.01 K/uL    NEUTROPHILS 83 (H) 32 - 75 %    LYMPHOCYTES 11 (L) 12 - 49 %    MONOCYTES 5 5 - 13 %    EOSINOPHILS 0 0 - 7 %    BASOPHILS 0 0 - 1 %    IMMATURE GRANULOCYTES 1 (H) 0 - 0.5 %    ABS. NEUTROPHILS 9.2 (H) 1.8 - 8.0 K/UL    ABS. LYMPHOCYTES 1.2 0.8 - 3.5 K/UL    ABS. MONOCYTES 0.6 0.0 - 1.0 K/UL    ABS. EOSINOPHILS 0.0 0.0 - 0.4 K/UL    ABS. BASOPHILS 0.0 0.0 - 0.1 K/UL    ABS. IMM. GRANS. 0.1 (H) 0.00 - 0.04 K/UL    DF AUTOMATED      RBC COMMENTS Polychromasia  1+        RBC COMMENTS Target Cells  2+        RBC COMMENTS Anisocytosis  1+        RBC COMMENTS Macrocytosis  1+       VANCOMYCIN, TROUGH    Collection Time: 03/19/23  3:30 AM   Result Value Ref Range    Vancomycin,trough 19.5 (H) 5.0 - 10.0 ug/mL   C REACTIVE PROTEIN, QT    Collection Time: 03/19/23  3:30 AM   Result Value Ref Range    C-Reactive protein 6.02 (H) 0.00 - 0.60 mg/dL   PROCALCITONIN    Collection Time: 03/19/23  3:30 AM   Result Value Ref Range    Procalcitonin 8.69 (H) 0 ng/mL   GLUCOSE, POC    Collection Time: 03/19/23  8:16 AM   Result Value Ref Range    Glucose (POC) 89 65 - 100 mg/dL    Performed by Sigifredo Avendano        CT CHEST WO CONT   Final Result   Moderate right pleural fluid collection with air-fluid level and numerous   nondependent air bubbles compatible with the patient's history of empyema. Underlying pulmonary consolidation. XR CHEST PORT   Final Result   No acute process. Cardiomegaly.           Intake and Output:  Current Shift: No intake/output data recorded. Last three shifts: 03/17 1901 - 03/19 0700  In: 2294.6 [I.V.:1698.3]  Out: 0       Lab/Data Review:  Recent Labs     03/19/23 0330 03/18/23  2104 03/18/23  1115 03/18/23 0249 03/17/23  1209   WBC 11.1*  --   --  12.7* 13.1*   HGB 7.7* 6.7* 6.7* 6.7* 6.2*   HCT 22.5* 19.7* 20.6* 20.5* 19.8*     --   --  205 269       Recent Labs     03/19/23 0330 03/18/23 0249 03/18/23  0229 03/17/23  1209   * 134*  --  135*   K 3.8 4.2  --  5.0   CL 95* 97  --  95*   CO2 29 27  --  22   GLU 91 63*  --  25*   BUN 60* 47*  --  54*   CREA 3.25* 2.68*  --  3.41*   CA 8.0* 8.3* 8.3* 8.4*   MG  --   --   --  2.1   PHOS  --  4.0  --   --    ALB 1.8* 2.1*  --  2.0*   TBILI 0.8  --   --  1.1*   ALT 15  --   --  17       No results for input(s): PH, PCO2, PO2, HCO3, FIO2 in the last 72 hours.   Recent Results (from the past 24 hour(s))   VANCOMYCIN, TROUGH    Collection Time: 03/18/23 11:15 AM   Result Value Ref Range    Vancomycin,trough 12.6 (H) 5.0 - 10.0 ug/mL   HGB & HCT    Collection Time: 03/18/23 11:15 AM   Result Value Ref Range    HGB 6.7 (L) 11.5 - 16.0 g/dL    HCT 20.6 (L) 35.0 - 47.0 %   GLUCOSE, POC    Collection Time: 03/18/23 11:17 AM   Result Value Ref Range    Glucose (POC) 70 65 - 100 mg/dL    Performed by [id number], POC    Collection Time: 03/18/23  3:34 PM   Result Value Ref Range    Glucose (POC) 77 65 - 100 mg/dL    Performed by To Chaudhry    HGB & HCT    Collection Time: 03/18/23  9:04 PM   Result Value Ref Range    HGB 6.7 (L) 11.5 - 16.0 g/dL    HCT 19.7 (L) 35.0 - 55.5 %   METABOLIC PANEL, COMPREHENSIVE    Collection Time: 03/19/23  3:30 AM   Result Value Ref Range    Sodium 131 (L) 136 - 145 mmol/L    Potassium 3.8 3.5 - 5.1 mmol/L    Chloride 95 (L) 97 - 108 mmol/L    CO2 29 21 - 32 mmol/L    Anion gap 7 5 - 15 mmol/L    Glucose 91 65 - 100 mg/dL    BUN 60 (H) 6 - 20 mg/dL    Creatinine 3.25 (H) 0.55 - 1.02 mg/dL    BUN/Creatinine ratio 18 12 - 20      eGFR 15 (L) >60 ml/min/1.73m2    Calcium 8.0 (L) 8.5 - 10.1 mg/dL    Bilirubin, total 0.8 0.2 - 1.0 mg/dL    AST (SGOT) 29 15 - 37 U/L    ALT (SGPT) 15 12 - 78 U/L    Alk. phosphatase 120 (H) 45 - 117 U/L    Protein, total 6.4 6.4 - 8.2 g/dL    Albumin 1.8 (L) 3.5 - 5.0 g/dL    Globulin 4.6 (H) 2.0 - 4.0 g/dL    A-G Ratio 0.4 (L) 1.1 - 2.2     CBC WITH AUTOMATED DIFF    Collection Time: 03/19/23  3:30 AM   Result Value Ref Range    WBC 11.1 (H) 3.6 - 11.0 K/uL    RBC 2.68 (L) 3.80 - 5.20 M/uL    HGB 7.7 (L) 11.5 - 16.0 g/dL    HCT 22.5 (L) 35.0 - 47.0 %    MCV 84.0 80.0 - 99.0 FL    MCH 28.7 26.0 - 34.0 PG    MCHC 34.2 30.0 - 36.5 g/dL    RDW 18.2 (H) 11.5 - 14.5 %    PLATELET 038 718 - 185 K/uL    MPV 10.4 8.9 - 12.9 FL    NRBC 7.3 (H) 0.0  WBC    ABSOLUTE NRBC 0.81 (H) 0.00 - 0.01 K/uL    NEUTROPHILS 83 (H) 32 - 75 %    LYMPHOCYTES 11 (L) 12 - 49 %    MONOCYTES 5 5 - 13 %    EOSINOPHILS 0 0 - 7 %    BASOPHILS 0 0 - 1 %    IMMATURE GRANULOCYTES 1 (H) 0 - 0.5 %    ABS. NEUTROPHILS 9.2 (H) 1.8 - 8.0 K/UL    ABS. LYMPHOCYTES 1.2 0.8 - 3.5 K/UL    ABS. MONOCYTES 0.6 0.0 - 1.0 K/UL    ABS. EOSINOPHILS 0.0 0.0 - 0.4 K/UL    ABS. BASOPHILS 0.0 0.0 - 0.1 K/UL    ABS. IMM.  GRANS. 0.1 (H) 0.00 - 0.04 K/UL    DF AUTOMATED      RBC COMMENTS Polychromasia  1+        RBC COMMENTS Target Cells  2+        RBC COMMENTS Anisocytosis  1+        RBC COMMENTS Macrocytosis  1+       VANCOMYCIN, TROUGH    Collection Time: 03/19/23  3:30 AM   Result Value Ref Range    Vancomycin,trough 19.5 (H) 5.0 - 10.0 ug/mL   C REACTIVE PROTEIN, QT    Collection Time: 03/19/23  3:30 AM   Result Value Ref Range    C-Reactive protein 6.02 (H) 0.00 - 0.60 mg/dL   PROCALCITONIN    Collection Time: 03/19/23  3:30 AM   Result Value Ref Range    Procalcitonin 8.69 (H) 0 ng/mL   GLUCOSE, POC    Collection Time: 03/19/23  8:16 AM   Result Value Ref Range    Glucose (POC) 89 65 - 100 mg/dL    Performed by Ellie Veloz ______________________________________________________________________________    Hector ANGELES Uribe    This is dictation was done by [employer], [employer] voice recognition software. Quite often unanticipated grammatical, syntax, homophones and other interpretive errors or inadvertently transcribed by the computer software. Please excuse errors that have escaped final proofreading. Thank you.

## 2023-03-19 NOTE — PROGRESS NOTES
Progress Note    Patient: Hernan Gatica MRN: [id number]  SSN: [id number]    YOB: 1955  Age: 70 y.o. Sex: female      Admit Date: 3/17/2023    LOS: 1 day     Subjective:   Patient followed for right lung empyema. She remains afebrile with decreasing WBC and CRP. She was seen by General Surgery who feels that she may require VATS procedure and has recommended transfer to tertiary center where CT Surgery is available. Patient is asleep at this time. Objective:     Vitals:    03/19/23 0900 03/19/23 1000 03/19/23 1100 03/19/23 1115   BP: 95/70 (!) 84/60 (!) 74/55 (!) 77/55   Pulse: 79 74 69 69   Resp: 17 16 17 16   Temp:       SpO2: 91% 100% 98% 99%   Weight:       Height:            Intake and Output:  Current Shift: No intake/output data recorded. Last three shifts: 03/17 1901 - 03/19 0700  In: 2294.6 [I.V.:1698.3]  Out: 0     Physical Exam:   Vitals and nursing note reviewed. Constitutional:       General: She is in acute distress. Appearance: She is ill-appearing. Comments:   Cachectic  Room Air SpO2 99%   HENT:      Head: Normocephalic and atraumatic. Right Ear: External ear normal.      Left Ear: External ear normal.      Nose: Nose normal.      Mouth/Throat:      Pharynx: Oropharynx is clear. Eyes:      Pupils: Pupils are equal, round, and reactive to light. Cardiovascular: Hemodialysis access     Rate and Rhythm: Regular rhythm. Tachycardia present. Heart sounds: No murmur heard. Pulmonary:      Breath sounds: No rhonchi. Comments: Decreased BS right hemithorax  Abdominal:      General: Bowel sounds are normal. There is no distension. Palpations: Abdomen is soft. Tenderness: There is no abdominal tenderness. Genitourinary:     Comments: No Hess catheter  Musculoskeletal:      Cervical back: Neck supple. Right lower leg: No edema. Left lower leg: No edema. Skin:     Findings: No rash.    Neurological: asleep  Psychiatric: asleep       Lab/Data Review:     WBC 11,100  Lactic acid 5.5    CRP 6.02 <9.22  Procal 8.69 <8.31    MRSA nasal PCR Positive    Pleural fluid (7/01) Granulicatella adiacens and Streptococcus mitis/oralis  Blood cultures (3/17) No growth 2 days  Blood cultures (3/18) Probably Staphylocococcal species in 3 of 4 bottles    Assessment:     Active Problems:    Altered mental status (3/18/2023)      Anemia (3/18/2023)      Tachycardia (3/18/2023)      Sinus tachycardia (3/18/2023)         Empyema, right lung, secondary to Granulicatella adiacens and Streptococcus oralis/mitais, on IV Vancomycin  Sepsis with leukocytosis, elevated lactic acid, CRP and procal  Positive blood cultures for Staphylococcal species, possibly significant in view of dialysis catheter, on IV Vancomycin  ESRD on hemodialysis     Comment: WBC and CRP decreasing.      Plan:      Continue IV Vancomycin  Follow-up pending blood cultures  Pending transfer to tertiary center for CT Surgery support       Signed By: Sukhdeep Trinidad MD     March 19, 2023

## 2023-03-19 NOTE — CONSULTS
Consult Date: 3/19/2023    Consults    Subjective   The patient is a 70 female who was recently discharged on February 27, 2023 with a diagnosis of right chest empyema. She had a IR placed March because of the thickness of the fluid. It was sent for culture and grew moderate Granulicatella adiacens with a few Streptococcus mities/oralis. The patient already been discharged by the time the culture results have resulted and therefore was never treated with antibiotics for it. Patient represents the emergency department last night with lethargy and shortness of breath. She is a dialysis patient and had missed dialysis that day. She was seen by nephrology and went to dialysis. When she returned from dialysis she was still tachycardic and tachypneic. Her presentation showed leukocytosis white blood count of 13,100. She is also anemic with hemoglobin 6.2. She has since been transfused with repeat hemoglobin 7.7 after 2 unit transfusion. CT of the chest demonstrated moderate right pleural fluid collection with air-fluid levels and numerous nondependent air bubbles consistent with empyema. This a.m. the patient chief complaint is right mid back pain. She denies any shortness of breath currently.   Past Medical History:   Diagnosis Date    Heart failure ([address]     Hypertension     Kidney disease       Past Surgical History:   Procedure Laterality Date    HX KNEE REPLACEMENT Right     HX UROLOGICAL  09/23/2021    cystospoy    IR THORACENTESIS NDL PUNC ASP W IMAGE  2/27/2023     Family History   Problem Relation Age of Onset    Hypertension Mother       Social History     Tobacco Use    Smoking status: Former     Years: 10.00     Types: Cigarettes    Smokeless tobacco: Never   Substance Use Topics    Alcohol use: Not Currently       Current Facility-Administered Medications   Medication Dose Route Frequency Provider Last Rate Last Admin    dextrose 5% and 0.9% NaCl infusion  75 mL/hr IntraVENous CONTINUOUS ANGELES Flores ON 3/20/2023] Vancomycin - Please draw a radnom level on 3/20 at 0400. Thanks!    Other Dotty Gomes MD        albumin human 25% (BUMINATE) solution 12.5 g  12.5 g IntraVENous Q6H Riley Galeana NP        midodrine (PROAMATINE) tablet 10 mg  10 mg Oral TID WITH MEALS ANGELES Flores ON 3/20/2023] epoetin erich-epbx (RETACRIT) injection 6,000 Units  6,000 Units IntraVENous Q MON, WED & FRI Carol Smith MD        carvediloL (COREG) tablet 12.5 mg  12.5 mg Oral BID Pato Arias MD   12.5 mg at 03/19/23 0817    sodium chloride (NS) flush 5-40 mL  5-40 mL IntraVENous Q8H Pato Arias MD   10 mL at 03/19/23 0521    sodium chloride (NS) flush 5-40 mL  5-40 mL IntraVENous PRN Pato Arias MD        acetaminophen (TYLENOL) tablet 650 mg  650 mg Oral Q6H PRN Pato Arias MD   650 mg at 03/19/23 1024    Or    acetaminophen (TYLENOL) suppository 650 mg  650 mg Rectal Q6H PRN Pato Arias MD        ondansetron (ZOFRAN ODT) tablet 4 mg  4 mg Oral Q6H PRN Pato Arias MD        Or    ondansetron (ZOFRAN) injection 4 mg  4 mg IntraVENous Q6H PRN Pato Arias MD        sodium chloride (NS) flush 5-10 mL  5-10 mL IntraVENous PRN Kamari Bartlett MD        docusate (COLACE) 50 mg/5 mL oral liquid 250 mg  250 mg Oral DAILY Pato Arias MD   250 mg at 03/19/23 0816    Vancomycin Pharmacy Dosing   Other Rx Dosing/Monitoring Pato Arias MD        0.9% sodium chloride infusion 250 mL  250 mL IntraVENous PRN Riley Galeana NP        dronabinoL (MARINOL) capsule 10 mg  10 mg Oral BID Riley Galeana NP   10 mg at 03/19/23 0817    0.9% sodium chloride infusion 250 mL  250 mL IntraVENous PRN Riley Galeana NP        dextrose 10% infusion 0-250 mL  0-250 mL IntraVENous PRN Hola Schultz MD   IV Completed at 03/18/23 0424    0.9% sodium chloride infusion 250 mL  250 mL IntraVENous PRN Natalie Coburn MD        lidocaine 4 % patch 1 Patch  1 Patch TransDERmal Q24H Jackie Lockhart MD   1 Patch at 03/18/23 2242        Review of Systems   All other systems reviewed and are negative. Objective     Vital signs for last 24 hours:  Visit Vitals  BP (!) 77/55   Pulse 69   Temp (!) 95.7 °F (35.4 °C)   Resp 16   Ht 4' 11\" (1.499 m)   Wt 52.2 kg (115 lb)   SpO2 99%   BMI 23.23 kg/m²       Intake/Output this shift:  Current Shift: No intake/output data recorded. Last 3 Shifts: 03/17 1901 - 03/19 0700  In: 2294.6 [I.V.:1698.3]  Out: 0     Data Review:   Recent Results (from the past 24 hour(s))   GLUCOSE, POC    Collection Time: 03/18/23  3:34 PM   Result Value Ref Range    Glucose (POC) 77 65 - 100 mg/dL    Performed by Clari Price    HGB & HCT    Collection Time: 03/18/23  9:04 PM   Result Value Ref Range    HGB 6.7 (L) 11.5 - 16.0 g/dL    HCT 19.7 (L) 35.0 - 83.3 %   METABOLIC PANEL, COMPREHENSIVE    Collection Time: 03/19/23  3:30 AM   Result Value Ref Range    Sodium 131 (L) 136 - 145 mmol/L    Potassium 3.8 3.5 - 5.1 mmol/L    Chloride 95 (L) 97 - 108 mmol/L    CO2 29 21 - 32 mmol/L    Anion gap 7 5 - 15 mmol/L    Glucose 91 65 - 100 mg/dL    BUN 60 (H) 6 - 20 mg/dL    Creatinine 3.25 (H) 0.55 - 1.02 mg/dL    BUN/Creatinine ratio 18 12 - 20      eGFR 15 (L) >60 ml/min/1.73m2    Calcium 8.0 (L) 8.5 - 10.1 mg/dL    Bilirubin, total 0.8 0.2 - 1.0 mg/dL    AST (SGOT) 29 15 - 37 U/L    ALT (SGPT) 15 12 - 78 U/L    Alk.  phosphatase 120 (H) 45 - 117 U/L    Protein, total 6.4 6.4 - 8.2 g/dL    Albumin 1.8 (L) 3.5 - 5.0 g/dL    Globulin 4.6 (H) 2.0 - 4.0 g/dL    A-G Ratio 0.4 (L) 1.1 - 2.2     CBC WITH AUTOMATED DIFF    Collection Time: 03/19/23  3:30 AM   Result Value Ref Range    WBC 11.1 (H) 3.6 - 11.0 K/uL    RBC 2.68 (L) 3.80 - 5.20 M/uL    HGB 7.7 (L) 11.5 - 16.0 g/dL    HCT 22.5 (L) 35.0 - 47.0 %    MCV 84.0 80.0 - 99.0 FL    MCH 28.7 26.0 - 34.0 PG    MCHC 34.2 30.0 - 36.5 g/dL    RDW 18.2 (H) 11.5 - 14.5 %    PLATELET 499 998 - 193 K/uL    MPV 10.4 8.9 - 12.9 FL    NRBC 7.3 (H) 0.0  WBC    ABSOLUTE NRBC 0.81 (H) 0.00 - 0.01 K/uL    NEUTROPHILS 83 (H) 32 - 75 %    LYMPHOCYTES 11 (L) 12 - 49 %    MONOCYTES 5 5 - 13 %    EOSINOPHILS 0 0 - 7 %    BASOPHILS 0 0 - 1 %    IMMATURE GRANULOCYTES 1 (H) 0 - 0.5 %    ABS. NEUTROPHILS 9.2 (H) 1.8 - 8.0 K/UL    ABS. LYMPHOCYTES 1.2 0.8 - 3.5 K/UL    ABS. MONOCYTES 0.6 0.0 - 1.0 K/UL    ABS. EOSINOPHILS 0.0 0.0 - 0.4 K/UL    ABS. BASOPHILS 0.0 0.0 - 0.1 K/UL    ABS. IMM. GRANS. 0.1 (H) 0.00 - 0.04 K/UL    DF AUTOMATED      RBC COMMENTS Polychromasia  1+        RBC COMMENTS Target Cells  2+        RBC COMMENTS Anisocytosis  1+        RBC COMMENTS Macrocytosis  1+       VANCOMYCIN, TROUGH    Collection Time: 03/19/23  3:30 AM   Result Value Ref Range    Vancomycin,trough 19.5 (H) 5.0 - 10.0 ug/mL   C REACTIVE PROTEIN, QT    Collection Time: 03/19/23  3:30 AM   Result Value Ref Range    C-Reactive protein 6.02 (H) 0.00 - 0.60 mg/dL   PROCALCITONIN    Collection Time: 03/19/23  3:30 AM   Result Value Ref Range    Procalcitonin 8.69 (H) 0 ng/mL   GLUCOSE, POC    Collection Time: 03/19/23  8:16 AM   Result Value Ref Range    Glucose (POC) 89 65 - 100 mg/dL    Performed by [address] [place] [address], POC    Collection Time: 03/19/23 11:44 AM   Result Value Ref Range    Glucose (POC) 120 (H) 65 - 100 mg/dL    Performed by José Navarro        Physical Exam  Constitutional:       General: She is not in acute distress. Appearance: She is ill-appearing. Comments: Somnolent elderly female stated age   HENT:      Head: Normocephalic and atraumatic. Cardiovascular:      Rate and Rhythm: Normal rate and regular rhythm. Pulmonary:          Comments: Tender to palpation  Decreased breath sounds right base  Abdominal:      General: There is no distension. Palpations: Abdomen is soft. Tenderness: There is no abdominal tenderness. Musculoskeletal:         General: Normal range of motion. Cervical back: Normal range of motion and neck supple. Skin:     General: Skin is warm and dry. Neurological:      General: No focal deficit present. Mental Status: She is oriented to person, place, and time. Psychiatric:         Mood and Affect: Mood normal.         Thought Content: Thought content normal.         Judgment: Judgment normal.       Assessment and plan:  58-year-old female history of CKD on dialysis, heart failure, hypertension who presents with persistent empyema. She was discharged home with an empyema on February 27. Given the appearance on CT of the chronicity the patient will likely require VATS procedure for her empyema. I have recommended transfer to facility with thoracic surgery capability.     Continue IV antibiotics pending transfer

## 2023-03-19 NOTE — PROGRESS NOTES
Problem: Falls - Risk of  Goal: *Absence of Falls  Description: Document Sandi Montaño Fall Risk and appropriate interventions in the flowsheet. Outcome: Progressing Towards Goal  Note: Fall Risk Interventions:  Mobility Interventions: Bed/chair exit alarm         Medication Interventions: Bed/chair exit alarm, Patient to call before getting OOB, Teach patient to arise slowly    Elimination Interventions: Bed/chair exit alarm, Call light in reach, Toileting schedule/hourly rounds    History of Falls Interventions: Bed/chair exit alarm         Problem: Patient Education: Go to Patient Education Activity  Goal: Patient/Family Education  Outcome: Progressing Towards Goal     Problem: Pressure Injury - Risk of  Goal: *Prevention of pressure injury  Description: Document Isak Scale and appropriate interventions in the flowsheet. Outcome: Progressing Towards Goal  Note: Pressure Injury Interventions:  Sensory Interventions: Assess need for specialty bed, Discuss PT/OT consult with provider, Keep linens dry and wrinkle-free, Minimize linen layers, Monitor skin under medical devices, Pad between skin to skin, Pressure redistribution bed/mattress (bed type), Turn and reposition approx. every two hours (pillows and wedges if needed)    Moisture Interventions: Absorbent underpads, Apply protective barrier, creams and emollients, Assess need for specialty bed, Check for incontinence Q2 hours and as needed, Internal/External urinary devices, Maintain skin hydration (lotion/cream), Minimize layers, Moisture barrier, Offer toileting Q_hr    Activity Interventions: Assess need for specialty bed, Pressure redistribution bed/mattress(bed type), PT/OT evaluation    Mobility Interventions: Assess need for specialty bed, Float heels, Pressure redistribution bed/mattress (bed type), PT/OT evaluation, Turn and reposition approx.  every two hours(pillow and wedges)    Nutrition Interventions: Document food/fluid/supplement intake    Friction and Shear Interventions: Apply protective barrier, creams and emollients, Minimize layers                Problem: Patient Education: Go to Patient Education Activity  Goal: Patient/Family Education  Outcome: Progressing Towards Goal

## 2023-03-19 NOTE — PROGRESS NOTES
Vancomycin Dosing Consult  Army Torey is a 70 y.o. female with CAP (empyema in right lung) . Pharmacy was consulted by Dr. Jaimie Collier / Dr. Rocío Mcbride to dose and monitor vancomycin. Today is day 2. Antibiotic regimen: Vancomycin monotherapy    Temp (24hrs), Av.6 °F (36.4 °C), Min:96.1 °F (35.6 °C), Max:98.2 °F (36.8 °C)    Recent Labs     23  0330 23  0249 23  1209   WBC 11.1* 12.7* 13.1*   CREA 3.25* 2.68* 3.41*   BUN 60* 47* 54*       Recent Labs     23  0330 23  0249   CRP 6.02* 9.22*     Recent Labs     23  0330 23  0229   PCT 8.69* 8.31*     Est CrCl: HD patient  Concomitant nephrotoxic drugs: None    Cultures:   3/18 Blood x1: NGTD, prelim    MRSA Swab: Detected 23    Target range: Trough 21-24 mcg/mL (Intermittent hemodialysis, invasive MRSA infection or sepsis)    Recent level history:  Date/Time Dose & Interval Measured Level (mcg/mL) Associated AUC/SANDRITA   3/18/23 at 02:58 1000 mg x 1 dose 12.6    3/19 0330 500mg IV x1 19.5         Assessment/Plan:   Elevated WBC, CRP and Procalcitonin. Patient is HD (MWF?) and got one dialysis on last Friday. Vancomycin level was 19.5 today post HD on Friday. No dose is needed until we confirm the next dialysis (maybe Monday). Pharmacy will order another random level in the morning tomorrow. Antimicrobial stop date TBD.

## 2023-03-19 NOTE — PROGRESS NOTES
Renal Progress Note    NAME:  Alfredo Jaramillo   :   1955   MRN:   [id number]     Date/Time:  3/19/2023       Assessment:     ESRD - had HD on Fri (3/17/23)  Altered mental status - resolving  Anemia in CKD  Secondary Hyperparathyroidism - at target for ESRD  Empyema - R. Lung       Plan:     Hold off HD today. Ms Danika Chen mental status has improved. There is no S 3 gallop or pericardial rub. Volume Overload does not appear to be an issue today. She is normokalemic. Additionally, BPs are a bit soft. Nutritional support. Dose meds for her GFR.  EPO . PRBCs may be considered for HB < 7 g/dl. Will defer to the ICU team.  Follow lytes + cbc. HD again tomorrow (3/20/23)  Continue IVF to address hypotension. Pressors may be indicated. Agree with plans for transfer to [facility] (to address the empyema)         Subjective:         F/U -ESRD - 3/18/23      Feeling better. Dyspnea was not an issue this afternoon. F/U - ESRD --> 3/19/23    No mew complaints today. IVF was initiated to address hypotension. The team is working on a transfer to [employer] (in light of the empyema).       Review of Systems:  Y  N       Y  N  []         []          Fever/chills                                               []         []          Chest Pain  []         []          Cough                                                       []         []          Diarrhea   []         []          Sputum                                                     []         []          Constipation  []         []          SOB/MELO                                                []         []          Nausea/Vomit  []         []          Abd Pain                                                    []         []          Tolerating PT  []         []          Dysuria                                                      []         []          Tolerating Diet     []        Unable to obtain  ROS due to  []        mental status change  []        sedated   [] intubated    Medications reviewed:  Current Facility-Administered Medications   Medication Dose Route Frequency    dextrose 5% and 0.9% NaCl infusion  75 mL/hr IntraVENous CONTINUOUS    [START ON 3/20/2023] Vancomycin - Please draw a radnom level on 3/20 at 0400. Thanks!    Other ONCE    albumin human 25% (BUMINATE) solution 12.5 g  12.5 g IntraVENous Q6H    midodrine (PROAMATINE) tablet 10 mg  10 mg Oral TID WITH MEALS    [START ON 3/20/2023] epoetin erich-epbx (RETACRIT) injection 6,000 Units  6,000 Units IntraVENous Q MON, WED & FRI    carvediloL (COREG) tablet 12.5 mg  12.5 mg Oral BID    sodium chloride (NS) flush 5-40 mL  5-40 mL IntraVENous Q8H    sodium chloride (NS) flush 5-40 mL  5-40 mL IntraVENous PRN    acetaminophen (TYLENOL) tablet 650 mg  650 mg Oral Q6H PRN    Or    acetaminophen (TYLENOL) suppository 650 mg  650 mg Rectal Q6H PRN    ondansetron (ZOFRAN ODT) tablet 4 mg  4 mg Oral Q6H PRN    Or    ondansetron (ZOFRAN) injection 4 mg  4 mg IntraVENous Q6H PRN    sodium chloride (NS) flush 5-10 mL  5-10 mL IntraVENous PRN    docusate (COLACE) 50 mg/5 mL oral liquid 250 mg  250 mg Oral DAILY    Vancomycin Pharmacy Dosing   Other Rx Dosing/Monitoring    0.9% sodium chloride infusion 250 mL  250 mL IntraVENous PRN    dronabinoL (MARINOL) capsule 10 mg  10 mg Oral BID    0.9% sodium chloride infusion 250 mL  250 mL IntraVENous PRN    dextrose 10% infusion 0-250 mL  0-250 mL IntraVENous PRN    0.9% sodium chloride infusion 250 mL  250 mL IntraVENous PRN    lidocaine 4 % patch 1 Patch  1 Patch TransDERmal Q24H        Objective:   Vitals:  Visit Vitals  BP (!) 74/57   Pulse 71   Temp (!) 95.7 °F (35.4 °C) Comment: mistral-air applied   Resp 24   Ht 4' 11\" (1.499 m)   Wt 52.2 kg (115 lb)   SpO2 97%   BMI 23.23 kg/m²     Temp (24hrs), Av.5 °F (36.4 °C), Min:95.7 °F (35.4 °C), Max:98.2 °F (36.8 °C)      O2 Device: None (Room air)    Last 24hr Input/Output:    Intake/Output Summary (Last 24 hours) at 3/19/2023 1346  Last data filed at 3/19/2023 5615  Gross per 24 hour   Intake 1269.58 ml   Output 0 ml   Net 1269.58 ml          PHYSICAL EXAM:      Seen in Room 272    General:    Chronically ill-appearing lady lying in bed comfortably. Ms Stanislaw Goodson was having lunch. Two of her [address] Members were present during my visit. Head:   Normocephalic    Eyes:   Anicteric    Chest :            R. IJ Tunneled HD catheter. Lungs:   Clear to auscultation, no wheezes, no rales    CVS                 No S 3 gallop , no pericardial rub. Abdomen:   Not distended. Extremities: Minimal leg oedema    Psych:             Calml    Neurologic: Responding to questions appropriately        []        Telemetry Reviewed     []        NSR []        PAC/PVCs   []        Afib  []        Paced   []        NSVT   []        Hess []        NGT  []        Intubated on vent    Lab Data Reviewed:    Recent Results (from the past 24 hour(s))   GLUCOSE, POC    Collection Time: 03/18/23  3:34 PM   Result Value Ref Range    Glucose (POC) 77 65 - 100 mg/dL    Performed by Moshe Coley    HGB & HCT    Collection Time: 03/18/23  9:04 PM   Result Value Ref Range    HGB 6.7 (L) 11.5 - 16.0 g/dL    HCT 19.7 (L) 35.0 - 11.8 %   METABOLIC PANEL, COMPREHENSIVE    Collection Time: 03/19/23  3:30 AM   Result Value Ref Range    Sodium 131 (L) 136 - 145 mmol/L    Potassium 3.8 3.5 - 5.1 mmol/L    Chloride 95 (L) 97 - 108 mmol/L    CO2 29 21 - 32 mmol/L    Anion gap 7 5 - 15 mmol/L    Glucose 91 65 - 100 mg/dL    BUN 60 (H) 6 - 20 mg/dL    Creatinine 3.25 (H) 0.55 - 1.02 mg/dL    BUN/Creatinine ratio 18 12 - 20      eGFR 15 (L) >60 ml/min/1.73m2    Calcium 8.0 (L) 8.5 - 10.1 mg/dL    Bilirubin, total 0.8 0.2 - 1.0 mg/dL    AST (SGOT) 29 15 - 37 U/L    ALT (SGPT) 15 12 - 78 U/L    Alk.  phosphatase 120 (H) 45 - 117 U/L    Protein, total 6.4 6.4 - 8.2 g/dL    Albumin 1.8 (L) 3.5 - 5.0 g/dL    Globulin 4.6 (H) 2.0 - 4.0 g/dL    A-G Ratio 0.4 (L) 1.1 - 2.2     CBC WITH AUTOMATED DIFF    Collection Time: 03/19/23  3:30 AM   Result Value Ref Range    WBC 11.1 (H) 3.6 - 11.0 K/uL    RBC 2.68 (L) 3.80 - 5.20 M/uL    HGB 7.7 (L) 11.5 - 16.0 g/dL    HCT 22.5 (L) 35.0 - 47.0 %    MCV 84.0 80.0 - 99.0 FL    MCH 28.7 26.0 - 34.0 PG    MCHC 34.2 30.0 - 36.5 g/dL    RDW 18.2 (H) 11.5 - 14.5 %    PLATELET 103 134 - 833 K/uL    MPV 10.4 8.9 - 12.9 FL    NRBC 7.3 (H) 0.0  WBC    ABSOLUTE NRBC 0.81 (H) 0.00 - 0.01 K/uL    NEUTROPHILS 83 (H) 32 - 75 %    LYMPHOCYTES 11 (L) 12 - 49 %    MONOCYTES 5 5 - 13 %    EOSINOPHILS 0 0 - 7 %    BASOPHILS 0 0 - 1 %    IMMATURE GRANULOCYTES 1 (H) 0 - 0.5 %    ABS. NEUTROPHILS 9.2 (H) 1.8 - 8.0 K/UL    ABS. LYMPHOCYTES 1.2 0.8 - 3.5 K/UL    ABS. MONOCYTES 0.6 0.0 - 1.0 K/UL    ABS. EOSINOPHILS 0.0 0.0 - 0.4 K/UL    ABS. BASOPHILS 0.0 0.0 - 0.1 K/UL    ABS. IMM.  GRANS. 0.1 (H) 0.00 - 0.04 K/UL    DF AUTOMATED      RBC COMMENTS Polychromasia  1+        RBC COMMENTS Target Cells  2+        RBC COMMENTS Anisocytosis  1+        RBC COMMENTS Macrocytosis  1+       VANCOMYCIN, TROUGH    Collection Time: 03/19/23  3:30 AM   Result Value Ref Range    Vancomycin,trough 19.5 (H) 5.0 - 10.0 ug/mL   C REACTIVE PROTEIN, QT    Collection Time: 03/19/23  3:30 AM   Result Value Ref Range    C-Reactive protein 6.02 (H) 0.00 - 0.60 mg/dL   PROCALCITONIN    Collection Time: 03/19/23  3:30 AM   Result Value Ref Range    Procalcitonin 8.69 (H) 0 ng/mL   GLUCOSE, POC    Collection Time: 03/19/23  8:16 AM   Result Value Ref Range    Glucose (POC) 89 65 - 100 mg/dL    Performed by [address] [place] [address], POC    Collection Time: 03/19/23 11:44 AM   Result Value Ref Range    Glucose (POC) 120 (H) 65 - 100 mg/dL    Performed by Kelvin Chaudhry        Total time spent with patient:  []        15   []        25   []        35   []         __ minutes    []        Critical Care Provided    Care Plan discussed with:    ICU Nursing      [x]        Patient   []        Family    []        Care Manager   []        Consultant/Specialist :      []          >50% of visit spent in counseling and coordination of care   (Discussed []        CODE status,  []        Care Plan, []        D/C Planning)    ___________________________________________________    Attending Physician: Krystina Sibley MD

## 2023-03-20 LAB
AMIKACIN SUSC ISLT: NORMAL
AMPICILLIN/SULB, SUS11T: NORMAL
CEFTAROLINE: NORMAL
CIPROFLOXACIN, 997933: NORMAL
CLARITHROMYCIN, SUS17T: NORMAL
COMMENT: NORMAL
DAPTOMYCIN: NORMAL
DORIPENEM: NORMAL
GENTAMICIN 500: NORMAL
LEVOFLOXACIN, SUS18T: NORMAL
Lab: NORMAL
MEROPENEM, SUS19T: NORMAL
NITROFURANTOIN, 997943: NORMAL
TETRACYCLINE, SUS32T: NORMAL
TRIMETHOPRIM/SULFA, SUS21T: NORMAL
VANCOMYCIN, 98142: NORMAL

## 2023-03-20 NOTE — ADT AUTH CERT NOTES
[address] [address]     FACILITY NPI :[id number]  FACILITY TAX ID : [id number]     [id number] [address] 2 WEST ICU  [address] [address]  Beverly Johnson 30: [phone]  FX: [phone]      Patient Name :Laxmi Juarez   : 1955 (70 yrs)  MRN : [id number]     Patient Mailing Address [address] [place] [address] [47] , [id number]                                                               . Insurance Plan Payor: BLUE CROSS / Plan: [address] [place] [address] / Product Type: PPO /      Primary Coverage Subscriber ID : [id number]        Current Patient Class : INPATIENT  Admit Date : 3/17/2023     REQUESTED LEVEL OF CARE: INPATIENT [101]                                                           Diagnosis : Sinus tachycardia; Anemia; Altered mental status                          ICD10 Code : Sinus tachycardia [R00.0]  Anemia [D64.9]  Altered mental status [R41.82]     Current Room and Bed 272/01     Admitting and Attending Info:  Admitting Provider : Alea Bassett MD   NPI: [id number]  Admitting Provider Phone.  [phone]  Admitting Provider Address: [facility], [facility]     Attending Provider No att. providers found   NPI  Attending Provider Address:  [facility] [id number]     Attending Provider Phone: Attending janina phone: N/A        Utilization Reviews       Pulmonary Disease GRG - Care Day 1 (3/18/2023) by Lisette Caamcho       Review Entered Review Status   3/19/2023 1426 Completed      Criteria Review      Care Day: 1 Care Date: 3/18/2023 Level of Care: ICU    Guideline Day 1    Clinical Status    (X) * Clinical Indications met Interventions    (X) Inpatient interventions as needed    3/19/2023 14:26:28 EDT by Rashmi Rodas  COnsult ID  Consult nephrology for dialysis    * Milestone   Additional Notes   3/18/23   Day 1   IP, ICU      70 y.o. female with a history of end-stage renal disease on hemodialysis, hypertension, heart failure with reduced ejection fraction is brought to the emergency room due to shortness of breath that is worse from baseline. She is tachypneic and tachycardic, was evaluated in the emergency room, seen by nephrology and taken to the dialysis as she admits that might have been causing the issues. She returned from the dialysis, still tachycardic. On evaluation she looks cachectic and very tired. On evaluation by the emergency room physician patient is still tachypneic and tachycardic. Also she is very lethargic with change in mental status alert with more of lethargy. Lives alone at home. On recent admission to the hospital she is found with pleural effusion, had thoracentesis done by interventional radiology, they documented it was empyema with the typical fluid blocking the needle, malodorous. Sent for cultures. Also found with a lot of gas that was released during the procedure. Cultures with Staphylococcus. But I do not see any antibiotics that was started. Chest x-ray today suggestive of right base loss of volume, so I requested for a CT of the chest, which came back as empyema. Started on antibiotics IV vancomycin and cefepime, requested infectious disease consultation for recommendations of antibiotics, she may need  long-term antibiotic. Also need a consultation for evaluation if she needed any decortication. Vitals:   BP 96/73, P 116, R 31, 97% on RA, T 98   Physical Exam: Limited exam due to patient condition. General : Looks very tired, looks cachectic, tachypnea noted. Lethargy, unable to communicate.    HEENT : PERRLA, dry oral mucosa, atraumatic normocephalic, Normal ear and nose. Neck : Supple, no JVD, no masses noted, no carotid bruit. Lungs : Breath sounds with more rate air entry bilaterally, tachypnea as mentioned and using accessory muscles. CVS : Rhythm rate regular, S1+, S2+,    Abdomen : Soft, nontender, bowel sounds active. Extremities : No edema noted,  pedal pulses palpable. Musculoskeletal : Generalized wasting of muscles. No joint swelling or effusion, muscle tone and power appears normal.    Skin : Dry, poor skin turgor. No pathological rash. Lymphatic : No cervical lymphadenopathy. Neurological : Very lethargic      Abnl/Pertinent Labs/Radiology/Diagnostic Studies:   WBC: 13.1 (H) (C)   HGB: 6.2 (L)   HCT: 19.8 (L)   PLATELET: 221   Sodium: 135 (L)   Potassium: 5.0   Anion gap: 18 (H)   Glucose: 25 (LL)   BUN: 54 (H)   Creatinine: 3.41 (H)   Troponin-High Sensitivity: 90 (H)   NT pro-BNP: >35,000 (H)   Lactic acid: 5.5 (HH)      ED treatment:   Labs, imaging   [place] 5/325 PO x 1    NS  ml x 1       MD Consults/Assessments & Plans:   Metabolic encephalopathy: Etiology most likely infectious versus from end-stage renal disease. We will request further evaluation       Empyema right lower lobe: Started on cefepime and vancomycin, will request consultation from infectious disease, we have cultures with the sensitivities available from the last admission. We will follow with recommendations       Sinus tachycardia: Likely from decompensation due to her general condition and infectious etiology. Mild respiratory distress: On oxygen nasal cannula       Failure to thrive: Secondary to multiple comorbid conditions and chronic diseases. End-stage renal disease: On hemodialysis, already seen and evaluated by nephrology.       NEPHROLOGY CONSULT:   Assessment:   Renal Specific Problems   CKD 6   CAD   AMS   Anemia   SIRS   Dyspnea but without overt volume overload   Hypoglycemia   Hypercalcemia (with corrected calcium)   Hypoalbumin               Plan:       Obtain/ Order: labs/cultures/radiology/procedures:  renal panel, CBC, and blood culture     Fe sat, Hgb A1c   Needs nutritional review: increased protein intake       Therapeutic:     Dialysis 2 hrs with UF 1 liters, 2.0 Ca dialysate   Transfuse 1 unit today with HD   Usual HD in AM       If AMS post HD and blood transfusion then will need Neuro eval      INFECTIOUS DISEASE CONSULT:   ASSESSMENT/PLAN       1. Empyema, right lung, secondary to Granulicatella adiacens and Streptococcus oralis/mitais   2. Sepsis with leukocytosis, elevated lactic acid, CRP and procal   3. ESRD on hemodialysis       Comment: Granulicatella are also streptococci and like Streptococcus oralis/mitalis, are part of the oral joshua, which when aspirated can result in severe lung infection. Both are susceptible to Vancomycin. Granulicatella is usually resistant to Cefepime. Since no Gram negative rods, either aerobic or anaerobic have been identified, I would favor Vancomycin  alone. Patient is remarkably stable but  I think that chest tube will be needed. 1. Discontinue Cefepime (only single dose)   2. Continue IV Vancomycin   3. Follow-up blood cultures   4. If bacteremic, would obtain TTE   5. Consult General Surgery for possible chest tube      Medications:   Vancomycin 1000 mg IV x 1 then 500 mg IV x 1   Coreg 12.5 mg PO b.i.d. Maxipime 1 g IV x 1         dextrose 10% infusion 0-250 mL   Dose: 0-250 mL   Freq: AS NEEDED Route: IV   PRN Reason: PRN Reason (Other)   PRN Comment: hypoglycemia   Start: 03/17/23 1354      CM NOTE:   Transition of Care Plan:         CM discussed discharge planning with patient at bedside. Patient will return home selfcare at discharge. [facility] will transport. Patient lives alone in a one story home with one step to enter. Patient is independent with ADL/IADL care and self medicate. Friends drive to MD appt. [facility].  DME: [employer]. No previous services. Patient verified demographics. Orders:                       Pulmonary Disease GRG - Clinical Indications for Admission to Inpatient Care by Noah Amador       Review Entered Review Status   3/19/2023 1425 Completed      Criteria Review      Clinical Indications for Admission to Inpatient Care    Most Recent : Noah Amador Most Recent Date: 3/19/2023 14:25:33 EDT    (X) Hospital admission is needed for appropriate care of the patient because of  1 or more  of    the following  (1) (2) (3):       (X) Empyema or lung abscess (11) (12)       3/19/2023 14:25:33 EDT by Noah Amador         CT chest shows empyema     H&P Notes     H&P by Jennifer Delcid MD at 03/18/23 0202 documented on ED to Hosp-Admission (Discharged) from 3/17/2023 in [address] [place] [address] ICU    Author: Jennifer Delcid MD Author Type: Physician Filed: 03/18/23 0717   Note Status: Signed Cosign: Cosign Not Required Date of Service: 03/18/23 0202   : Jennifer Delcid MD (Physician)               [facility]  's  Hospitalist History & Physical Notes. [facility]. Name : Michelle Ellis      MRN number : [id number]     YOB: 1955     Subjective :   Chief Complaint : Shortness of breath, sinus tachycardia, missing dialysis. Source of information : Mostly from the ED provider, patient with lethargy did not communicate much. History of present illness:   Michelle Ellis is  70 y.o. female with a history of end-stage renal disease on hemodialysis, hypertension, heart failure with reduced ejection fraction is brought to the emergency room due to shortness of breath that is worse from baseline. She is tachypneic and tachycardic, was evaluated in the emergency room, seen by nephrology and taken to the dialysis as she admits that might have been causing the issues. She returned from the dialysis, still tachycardic. On evaluation she looks cachectic and very tired. On evaluation by the emergency room physician patient is still tachypneic and tachycardic. Also she is very lethargic with change in mental status alert with more of lethargy. Lives alone at home. On recent admission to the hospital she is found with pleural effusion, had thoracentesis done by interventional radiology, they documented it was empyema with the typical fluid blocking the needle, malodorous. Sent for cultures. Also found with a lot of gas that was released during the procedure. Cultures with Staphylococcus. But I do not see any antibiotics that was started. Chest x-ray today suggestive of right base loss of volume, so I requested for a CT of the chest, which came back as empyema. Started on antibiotics IV vancomycin and cefepime, requested infectious disease consultation for recommendations of antibiotics, she may need  long-term antibiotic. Also need a consultation for evaluation if she needed any decortication.           Past Medical History:   Diagnosis Date    Heart failure ([address]      Hypertension      Kidney disease              Past Surgical History:   Procedure Laterality Date    HX KNEE REPLACEMENT Right      HX UROLOGICAL   09/23/2021     cystospoy    IR THORACENTESIS NDL PUNC ASP W IMAGE   2/27/2023            Family History   Problem Relation Age of Onset    Hypertension Mother        Social History            Tobacco Use    Smoking status: Former       Years: 10.00       Types: Cigarettes    Smokeless tobacco: Never   Substance Use Topics    Alcohol use: Not Currently       No Known Allergies             Current Facility-Administered Medications   Medication Dose Route Frequency Provider Last Rate Last Admin    carvediloL (COREG) tablet 12.5 mg  12.5 mg Oral BID Natasha Rutherford MD        docusate sodium (COLACE) capsule 250 mg  250 mg Oral DAILY Natasha Rutherford MD        sodium chloride (NS) flush 5-40 mL  5-40 mL IntraVENous Q8H Amna Bowman MD        sodium chloride (NS) flush 5-40 mL  5-40 mL IntraVENous PRN Amna Bowman MD        acetaminophen (TYLENOL) tablet 650 mg  650 mg Oral Q6H PRN Amna Bowman MD         Or    acetaminophen (TYLENOL) suppository 650 mg  650 mg Rectal Q6H PRN Amna Bowman MD        ondansetron (ZOFRAN ODT) tablet 4 mg  4 mg Oral Q6H PRN Amna Bowman MD         Or    ondansetron (ZOFRAN) injection 4 mg  4 mg IntraVENous Q6H PRN Amna Bowman MD        dextrose 10% infusion 0-250 mL  0-250 mL IntraVENous PRN Denisse Luis MD   250 mL at 03/17/23 1400    0.9% sodium chloride infusion 250 mL  250 mL IntraVENous PRN Denisse Luis MD        lidocaine 4 % patch 1 Patch  1 Patch TransDERmal Q24H Maksim Mujica MD   1 Patch at 03/17/23 0234             Current Outpatient Medications   Medication Sig Dispense Refill    furosemide (LASIX) 80 mg tablet Take 80 mg by mouth two (2) times a day. metOLazone (ZAROXOLYN) 2.5 mg tablet Take 2.5 mg by mouth. docusate sodium (DOK) 250 mg capsule Take 250 mg by mouth daily. sevelamer carbonate (RENVELA) 800 mg tab tab Take 1,600 mg by mouth three (3) times daily. carvediloL (COREG) 12.5 mg tablet Take 12.5 mg by mouth two (2) times a day. Review of Systems:     Very lethargic and tachypneic when I seen her. Heart rate is between 100- 230. As she seems to be more unstable I requested to change admission to ICU.      Vitals:   Patient Vitals for the past 12 hrs:    Temp Pulse Resp BP SpO2   03/18/23 0004 -- (!) 121 (!) 31 109/83 97 %   03/17/23 2353 97.5 °F (36.4 °C) (!) 112 16 115/76 --   03/17/23 2344 -- (!) 114 27 115/76 98 %   03/17/23 2334 -- (!) 116 28 (!) 107/90 100 %   03/17/23 2305 -- (!) 111 18 (!) 177/143 --   03/17/23 2245 -- (!) 114 30 (!) 112/102 99 %   03/17/23 2105 -- (!) 109 22 (!) 158/125 100 %   03/17/23 2050 -- (!) 112 21 96/73 100 %   03/17/23 2035 -- (!) 111 24 99/75 100 %   03/17/23 2020 -- (!) 115 22 105/81 100 %   03/17/23 2005 -- (!) 112 24 100/65 100 %   03/17/23 2001 97.6 °F (36.4 °C) (!) 110 22 100/65 100 %   03/17/23 1946 -- (!) 109 23 99/70 100 %   03/17/23 1940 98 °F (36.7 °C) (!) 106 20 99/70 100 %   03/17/23 1937 98 °F (36.7 °C) (!) 105 21 98/67 100 %   03/17/23 1530 98.1 °F (36.7 °C) -- -- 111/65 --   03/17/23 1430 -- (!) 113 23 -- --         Physical Exam: Limited exam due to patient condition. General : Looks very tired, looks cachectic, tachypnea noted. Lethargy, unable to communicate. HEENT : PERRLA, dry oral mucosa, atraumatic normocephalic, Normal ear and nose. Neck : Supple, no JVD, no masses noted, no carotid bruit. Lungs : Breath sounds with more rate air entry bilaterally, tachypnea as mentioned and using accessory muscles. CVS : Rhythm rate regular, S1+, S2+,   Abdomen : Soft, nontender, bowel sounds active. Extremities : No edema noted,  pedal pulses palpable. Musculoskeletal : Generalized wasting of muscles. No joint swelling or effusion, muscle tone and power appears normal.   Skin : Dry, poor skin turgor. No pathological rash. Lymphatic : No cervical lymphadenopathy. Neurological : Very lethargic.        Data Review:   Recent Results         Recent Results (from the past 24 hour(s))   CBC WITH AUTOMATED DIFF     Collection Time: 03/17/23 12:09 PM   Result Value Ref Range     WBC 13.1 (H) 3.6 - 11.0 K/uL     RBC 2.22 (L) 3.80 - 5.20 M/uL     HGB 6.2 (L) 11.5 - 16.0 g/dL     HCT 19.8 (L) 35.0 - 47.0 %     MCV 89.2 80.0 - 99.0 FL     MCH 27.9 26.0 - 34.0 PG     MCHC 31.3 30.0 - 36.5 g/dL     RDW 21.5 (H) 11.5 - 14.5 %     PLATELET 124 018 - 466 K/uL     MPV 10.5 8.9 - 12.9 FL     NRBC 5.2 (H) 0.0  WBC     ABSOLUTE NRBC 0.75 (H) 0.00 - 0.01 K/uL     NEUTROPHILS 86 (H) 32 - 75 %     BAND NEUTROPHILS 2 0 - 6 %     LYMPHOCYTES 9 (L) 12 - 49 %     MONOCYTES 3 (L) 5 - 13 %     EOSINOPHILS 0 0 - 7 % BASOPHILS 0 0 - 1 %     NRBC 10.0  WBC     IMMATURE GRANULOCYTES 0 %     ABS. NEUTROPHILS 11.5 (H) 1.8 - 8.0 K/UL     ABS. LYMPHOCYTES 1.2 0.8 - 3.5 K/UL     ABS. MONOCYTES 0.4 0.0 - 1.0 K/UL     ABS. EOSINOPHILS 0.0 0.0 - 0.4 K/UL     ABS. BASOPHILS 0.0 0.0 - 0.1 K/UL     ABSOLUTE NRBC 1.31 K/uL     ABS. IMM. GRANS. 0.0 K/UL     DF AUTOMATED       RBC COMMENTS Anisocytosis  1+          RBC COMMENTS Hypochromia  1+          RBC COMMENTS Ovalocytes  1+        METABOLIC PANEL, COMPREHENSIVE     Collection Time: 03/17/23 12:09 PM   Result Value Ref Range     Sodium 135 (L) 136 - 145 mmol/L     Potassium 5.0 3.5 - 5.1 mmol/L     Chloride 95 (L) 97 - 108 mmol/L     CO2 22 21 - 32 mmol/L     Anion gap 18 (H) 5 - 15 mmol/L     Glucose 25 (LL) 65 - 100 mg/dL     BUN 54 (H) 6 - 20 mg/dL     Creatinine 3.41 (H) 0.55 - 1.02 mg/dL     BUN/Creatinine ratio 16 12 - 20       eGFR 14 (L) >60 ml/min/1.73m2     Calcium 8.4 (L) 8.5 - 10.1 mg/dL     Bilirubin, total 1.1 (H) 0.2 - 1.0 mg/dL     AST (SGOT) 39 (H) 15 - 37 U/L     ALT (SGPT) 17 12 - 78 U/L     Alk.  phosphatase 145 (H) 45 - 117 U/L     Protein, total 7.3 6.4 - 8.2 g/dL     Albumin 2.0 (L) 3.5 - 5.0 g/dL     Globulin 5.3 (H) 2.0 - 4.0 g/dL     A-G Ratio 0.4 (L) 1.1 - 2.2     TROPONIN-HIGH SENSITIVITY     Collection Time: 03/17/23 12:09 PM   Result Value Ref Range     Troponin-High Sensitivity 90 (H) 0 - 51 ng/L   NT-PRO BNP     Collection Time: 03/17/23 12:09 PM   Result Value Ref Range     NT pro-BNP >35,000 (H) <125 pg/mL   MAGNESIUM     Collection Time: 03/17/23 12:09 PM   Result Value Ref Range     Magnesium 2.1 1.6 - 2.4 mg/dL   TYPE & SCREEN     Collection Time: 03/17/23  4:00 PM   Result Value Ref Range     Crossmatch Expiration 03/20/2023,2944       ABO/Rh(D) O Positive       Antibody screen Negative       Unit number [id number]       Blood component type RC LR       Unit division 00       Status of unit Issued       Jignesh 99 to transfuse Crossmatch result Compatible     GLUCOSE, POC     Collection Time: 03/17/23  4:04 PM   Result Value Ref Range     Glucose (POC) 96 65 - 100 mg/dL     Performed by Mickey Molina              Radiologic Studies :   CT Results  (Last 48 hours)        None             CXR Results  (Last 48 hours)                    03/17/23 1445   XR CHEST PORT Final result     Impression:   No acute process. Cardiomegaly. Narrative:   PORTABLE CHEST RADIOGRAPH/S: 3/17/2023 2:45 PM       INDICATION: Chest pain. COMPARISON: 2/27/2023. TECHNIQUE: Portable frontal radiograph/s of the chest.       FINDINGS:    There is volume loss in the right lung, the right hemidiaphragm is eventrated or   elevated, and the hepatic flexure is interposed between the diaphragm and the   liver (Chilaiditi variant). Passive atelectasis in the right lung base is   associated. The left lung is clear. The central airways are patent. No   pneumothorax and no large pleural effusion. The heart is enlarged, even given   portable technique. A right IJ hemodialysis catheter terminates in the right   atrium. Assessment and Plan :      Metabolic encephalopathy: Etiology most likely infectious versus from end-stage renal disease. We will request further evaluation     Empyema right lower lobe: Started on cefepime and vancomycin, will request consultation from infectious disease, we have cultures with the sensitivities available from the last admission. We will follow with recommendations     Sinus tachycardia: Likely from decompensation due to her general condition and infectious etiology. Mild respiratory distress: On oxygen nasal cannula     Failure to thrive: Secondary to multiple comorbid conditions and chronic diseases. End-stage renal disease: On hemodialysis, already seen and evaluated by nephrology. Admitted to ICU, full CODE STATUS, cannot make decisions at this time due to her decreased mentation. Home medications reviewed with external Rx history. CC : Handy Silverman MD  Signed By: Sander Morales MD      March 18, 2023       This dictation was done by dragon, computer voice recognition software. Often unanticipated grammatical, syntax, Robinson phones and other interpretive errors are inadvertently transcribed. Please excuse errors that have escaped final proofreading.

## 2023-03-21 LAB
BACTERIA SPEC CULT: ABNORMAL
SPECIAL REQUESTS,SREQ: ABNORMAL

## 2023-03-22 NOTE — DISCHARGE SUMMARY
Hospitalist Discharge Summary     Patient ID:    Shelbi Ordonez  [id number]  12 y.o.  1955    Admit date: 3/17/2023    Discharge date : 3/19/2023      Final Diagnoses: Active Problems:    Altered mental status (3/18/2023)      Anemia (3/18/2023)      Tachycardia (3/18/2023)      Sinus tachycardia (3/18/2023)        Reason for Hospitalization/Hospital Course:   Shelbi Ordonez is  70 y.o. female with a history of end-stage renal disease on hemodialysis, hypertension, heart failure with reduced ejection fraction is brought to the emergency room due to shortness of breath that is worse from baseline. She is tachypneic and tachycardic, was evaluated in the emergency room, seen by nephrology and taken to the dialysis as she admits that might have been causing the issues. She returned from the dialysis, still tachycardic. On evaluation she looks cachectic and very tired. On evaluation by the emergency room physician patient is still tachypneic and tachycardic. Also she is very lethargic with change in mental status alert with more of lethargy. Lives alone at home. On recent admission to the hospital she is found with pleural effusion, had thoracentesis done by interventional radiology, they documented it was empyema with the typical fluid blocking the needle, malodorous. Sent for cultures. Also found with a lot of gas that was released during the procedure. The patient was discharged prior to results of cultures with Staphylococcus. Not discharged on antibiotics. Chest x-ray today suggestive of right base loss of volume,CT of the chest, which came back as empyema. Started on antibiotics IV vancomycin and cefepime. ID consultation for recommendations of antibiotics, she may need  long-term antibiotic. Also need a consultation for evaluation if she needed any decortication. She was admitted to the ICU for close monitoring. ID and nephrology were consulted. Received the patient on 3/18 review of ED findings significant labs on admission hemoglobin 6.7, WBC 12.7, sodium 134, creatinine 2.68, BUN 47. Will transfuse 1 unit PRBC. We will monitor laboratory values closely for any further declines in hemoglobin or elevations in WBC. Currently poor oral intake with point-of-care testing in the 70s. We will start gentle D5 infusion at 50 cc an hour until intake has improved to prevent hypoglycemia     3/18 ID recommending IV vancomycin to continue and a consult placed with general surgery for possible chest tube placement. 3/19 General surgery evaluated the patient, known empyema dating back to 2/27 recommending transfer for cardiothoracic surgery for likely VATS procedure for empyema. Blood pressures remaining fairly low 80s over 60s albumin noted at 1.8 we will give albumin replacement and started on midodrine for blood pressure support. The patient was accepted for transfer by [facility] cardiothoracic surgeon Bobo Caballero. Active Problems:    Altered mental status (3/18/2023)       Anemia (3/18/2023)       Tachycardia (3/18/2023)       Sinus tachycardia (0/49/0387)     Metabolic encephalopathy: Improving  -Etiology most likely infectious versus from end-stage renal disease. Empyema right lower lobe:   -ID consulted will request consultation from infectious disease, we have cultures with the -Sensitivities available from the last admission.  -Per ID Granulicatella are also streptococci and like Streptococcus oralis/mitalis, are part of the oral joshua, which when aspirated can result in severe lung infection. Both are susceptible to Vancomycin  -Consult placed with general surgery for possible chest tube placement, recommending transfer for cardiothoracic surgery patient will likely require VATS procedure for empyema     Sinus tachycardia:   -Likely from decompensation due to her general condition and infectious etiology.      Mild respiratory distress on admission:   -On oxygen nasal cannula weaned off     Failure to thrive:   -Secondary to multiple comorbid conditions and chronic diseases.  -Albumin 1.8 we will give replacement 12.5 every 6 hours x2 days     End-stage renal disease:   -On hemodialysis, already seen and evaluated by nephrology. Anemia  -Hemoglobin 6.7 transfuse 2 unit PRBC  -Monitor CBC closely  -Transfuse PRBC for hemoglobin 7.0 or less     Failure to thrive  Poor oral intake  - Gentle IV fluid D5 50 an hour until oral intake improves  -Monitor point-of-care to avoid hypoglycemia  -Marinol initiated to improve appetite     Hypotension  -Start midodrine 10 mg 3 times daily with hold parameters  -Started on albumin replacement therapy           Admitted to ICU, full CODE STATUS, cannot make decisions at this time due to her decreased mentation. Home medications reviewed with external Rx history. MDM  Number of Diagnoses or Management Options  ESRD on dialysis [facility]): established, improving  SOB (shortness of breath): new, needed workup  Diagnosis management comments: ID has been consulted for positive cultures from previous admission not treated with antibiotics upon that discharge. Recommending IV vancomycin and surgery consult for chest tube placement        Amount and/or Complexity of Data Reviewed  Clinical lab tests: ordered and reviewed  Discussion of test results with the performing providers: yes  Discuss the patient with other providers: yes (For general surgery patient requires level of care not available at this facility.   Patient will require decortication, will reach out to [facility] or [facility] for possible transfer)     Risk of Complications, Morbidity, and/or Mortality  Presenting problems: high  Management options: high     Critical Care  Total time providing critical care: 30-74 minutes     Patient Progress  Transfer to [address] [place] under cardiac thoracic service Dr. Suzanne Alcocer        DVT Prophylaxis: SCDs  Code Status: Full Code  POA/NOK:    Discharge Medications:   Discharge Medication List as of 3/19/2023  9:37 PM        Current Facility-Administered Medications   Medication Dose Route Frequency    carvediloL (COREG) tablet 12.5 mg  12.5 mg Oral BID    sodium chloride (NS) flush 5-40 mL  5-40 mL IntraVENous Q8H    sodium chloride (NS) flush 5-40 mL  5-40 mL IntraVENous PRN    acetaminophen (TYLENOL) tablet 650 mg  650 mg Oral Q6H PRN     Or    acetaminophen (TYLENOL) suppository 650 mg  650 mg Rectal Q6H PRN    ondansetron (ZOFRAN ODT) tablet 4 mg  4 mg Oral Q6H PRN     Or    ondansetron (ZOFRAN) injection 4 mg  4 mg IntraVENous Q6H PRN    sodium chloride (NS) flush 5-10 mL  5-10 mL IntraVENous PRN    docusate (COLACE) 50 mg/5 mL oral liquid 250 mg  250 mg Oral DAILY    Vancomycin Pharmacy Dosing   Other Rx Dosing/Monitoring    dextrose 5% infusion  50 mL/hr IntraVENous CONTINUOUS    0.9% sodium chloride infusion 250 mL  250 mL IntraVENous PRN    dronabinoL (MARINOL) capsule 10 mg  10 mg Oral BID    0.9% sodium chloride infusion 250 mL  250 mL IntraVENous PRN    dextrose 10% infusion 0-250 mL  0-250 mL IntraVENous PRN    0.9% sodium chloride infusion 250 mL  250 mL IntraVENous PRN    lidocaine 4 % patch 1 Patch  1 Patch TransDERmal Q24H         REVIEW OF SYSTEMS     Review of Systems   Constitutional:  Positive for malaise/fatigue. Respiratory:  Negative for shortness of breath. Cardiovascular:  Negative for chest pain. Musculoskeletal:  Positive for myalgias. Neurological:  Positive for weakness. Psychiatric/Behavioral:  The patient is nervous/anxious.         Objective:      Visit Vitals  BP 98/77   Pulse 85   Temp 97.6 °F (36.4 °C)   Resp 19   Ht 4' 11\" (1.499 m)   Wt 52.2 kg (115 lb)   SpO2 98%   BMI 23.23 kg/m²      O2 Device: None (Room air)     Temp (24hrs), Av.6 °F (36.4 °C), Min:96.1 °F (35.6 °C), Max:98.2 °F (36.8 °C)           PHYSICAL EXAM:     Physical Exam  Constitutional:       Appearance: She is ill-appearing. Comments: Thin and frail   Cardiovascular:      Rate and Rhythm: Regular rhythm. Tachycardia present. Pulmonary:      Effort: No respiratory distress. Comments: Decreased breath sounds on right  Abdominal:      General: There is no distension. Musculoskeletal:      Right lower leg: No edema. Left lower leg: No edema. Skin:     General: Skin is dry. Neurological:      Mental Status: She is oriented to person, place, and time. Motor: Weakness present. Gait: Gait abnormal.         Data Review     Recent Results         Recent Results (from the past 24 hour(s))   VANCOMYCIN, TROUGH     Collection Time: 03/18/23 11:15 AM   Result Value Ref Range     Vancomycin,trough 12.6 (H) 5.0 - 10.0 ug/mL   HGB & HCT     Collection Time: 03/18/23 11:15 AM   Result Value Ref Range     HGB 6.7 (L) 11.5 - 16.0 g/dL     HCT 20.6 (L) 35.0 - 47.0 %   GLUCOSE, POC     Collection Time: 03/18/23 11:17 AM   Result Value Ref Range     Glucose (POC) 70 65 - 100 mg/dL     Performed by [address], POC     Collection Time: 03/18/23  3:34 PM   Result Value Ref Range     Glucose (POC) 77 65 - 100 mg/dL     Performed by Aung Wood     HGB & HCT     Collection Time: 03/18/23  9:04 PM   Result Value Ref Range     HGB 6.7 (L) 11.5 - 16.0 g/dL     HCT 19.7 (L) 35.0 - 16.1 %   METABOLIC PANEL, COMPREHENSIVE     Collection Time: 03/19/23  3:30 AM   Result Value Ref Range     Sodium 131 (L) 136 - 145 mmol/L     Potassium 3.8 3.5 - 5.1 mmol/L     Chloride 95 (L) 97 - 108 mmol/L     CO2 29 21 - 32 mmol/L     Anion gap 7 5 - 15 mmol/L     Glucose 91 65 - 100 mg/dL     BUN 60 (H) 6 - 20 mg/dL     Creatinine 3.25 (H) 0.55 - 1.02 mg/dL     BUN/Creatinine ratio 18 12 - 20       eGFR 15 (L) >60 ml/min/1.73m2     Calcium 8.0 (L) 8.5 - 10.1 mg/dL     Bilirubin, total 0.8 0.2 - 1.0 mg/dL     AST (SGOT) 29 15 - 37 U/L     ALT (SGPT) 15 12 - 78 U/L     Alk.  phosphatase 120 (H) 45 - 117 U/L     Protein, total 6.4 6.4 - 8.2 g/dL     Albumin 1.8 (L) 3.5 - 5.0 g/dL     Globulin 4.6 (H) 2.0 - 4.0 g/dL     A-G Ratio 0.4 (L) 1.1 - 2.2     CBC WITH AUTOMATED DIFF     Collection Time: 03/19/23  3:30 AM   Result Value Ref Range     WBC 11.1 (H) 3.6 - 11.0 K/uL     RBC 2.68 (L) 3.80 - 5.20 M/uL     HGB 7.7 (L) 11.5 - 16.0 g/dL     HCT 22.5 (L) 35.0 - 47.0 %     MCV 84.0 80.0 - 99.0 FL     MCH 28.7 26.0 - 34.0 PG     MCHC 34.2 30.0 - 36.5 g/dL     RDW 18.2 (H) 11.5 - 14.5 %     PLATELET 274 048 - 336 K/uL     MPV 10.4 8.9 - 12.9 FL     NRBC 7.3 (H) 0.0  WBC     ABSOLUTE NRBC 0.81 (H) 0.00 - 0.01 K/uL     NEUTROPHILS 83 (H) 32 - 75 %     LYMPHOCYTES 11 (L) 12 - 49 %     MONOCYTES 5 5 - 13 %     EOSINOPHILS 0 0 - 7 %     BASOPHILS 0 0 - 1 %     IMMATURE GRANULOCYTES 1 (H) 0 - 0.5 %     ABS. NEUTROPHILS 9.2 (H) 1.8 - 8.0 K/UL     ABS. LYMPHOCYTES 1.2 0.8 - 3.5 K/UL     ABS. MONOCYTES 0.6 0.0 - 1.0 K/UL     ABS. EOSINOPHILS 0.0 0.0 - 0.4 K/UL     ABS. BASOPHILS 0.0 0.0 - 0.1 K/UL     ABS. IMM. GRANS. 0.1 (H) 0.00 - 0.04 K/UL     DF AUTOMATED       RBC COMMENTS Polychromasia  1+          RBC COMMENTS Target Cells  2+          RBC COMMENTS Anisocytosis  1+          RBC COMMENTS Macrocytosis  1+        VANCOMYCIN, TROUGH     Collection Time: 03/19/23  3:30 AM   Result Value Ref Range     Vancomycin,trough 19.5 (H) 5.0 - 10.0 ug/mL   C REACTIVE PROTEIN, QT     Collection Time: 03/19/23  3:30 AM   Result Value Ref Range     C-Reactive protein 6.02 (H) 0.00 - 0.60 mg/dL   PROCALCITONIN     Collection Time: 03/19/23  3:30 AM   Result Value Ref Range     Procalcitonin 8.69 (H) 0 ng/mL   GLUCOSE, POC     Collection Time: 03/19/23  8:16 AM   Result Value Ref Range     Glucose (POC) 89 65 - 100 mg/dL     Performed by Cholo Barney              CT CHEST WO CONT   Final Result   Moderate right pleural fluid collection with air-fluid level and numerous   nondependent air bubbles compatible with the patient's history of empyema. Underlying pulmonary consolidation. XR CHEST PORT   Final Result   No acute process. Cardiomegaly. Intake and Output:  Current Shift: No intake/output data recorded. Last three shifts: 03/17 1901 - 03/19 0700  In: 2294.6 [I.V.:1698.3]  Out: 0         Lab/Data Review:          Recent Labs     03/19/23  0330 03/18/23  2104 03/18/23  1115 03/18/23  0249 03/17/23  1209   WBC 11.1*  --   --  12.7* 13.1*   HGB 7.7* 6.7* 6.7* 6.7* 6.2*   HCT 22.5* 19.7* 20.6* 20.5* 19.8*     --   --  205 269                Recent Labs     03/19/23  0330 03/18/23  0249 03/18/23  0229 03/17/23  1209   * 134*  --  135*   K 3.8 4.2  --  5.0   CL 95* 97  --  95*   CO2 29 27  --  22   GLU 91 63*  --  25*   BUN 60* 47*  --  54*   CREA 3.25* 2.68*  --  3.41*   CA 8.0* 8.3* 8.3* 8.4*   MG  --   --   --  2.1   PHOS  --  4.0  --   --    ALB 1.8* 2.1*  --  2.0*   TBILI 0.8  --   --  1.1*   ALT 15  --   --  17          Follow up Care:    1. Pooja Acevedo MD in 1-2 weeks. Follow-up Information       Follow up With Specialties Details Why Contact Info    Deirdre Taylor MD Nephrology Schedule an appointment as soon as possible for a visit   [address] [place] [address]  [phone]                Patient Follow Up Instructions: Activity: Activity as tolerated  Diet:  Cardiac Diet  Wound Care: None needed     Condition at Discharge:  Stable  __________________________________________________________________    Disposition  Acute Facility   ____________________________________________________________________    Code Status:  Prior  ___________________________________________________________________    Discharge Exam:  Patient seen and examined by me on discharge day. Pertinent Findings:  Gen:    Not in distress  Chest: Clear lungs  CVS:   Regular rhythm.   No edema  Abd:  Soft, not distended, not tender  Neuro:  Alert        CONSULTATIONS: ID and General Surgery    Significant Diagnostic Studies:   No results found for this or any previous visit (from the past 24 hour(s)). CT CHEST WO CONT   Final Result   Moderate right pleural fluid collection with air-fluid level and numerous   nondependent air bubbles compatible with the patient's history of empyema. Underlying pulmonary consolidation. XR CHEST PORT   Final Result   No acute process. Cardiomegaly.           Time spent in direct and indirect care including coordination of services: Greater than 35 minutes    Signed:  Araceli Eckert NP  3/22/2023  2:13 PM

## 2023-03-23 LAB
BACTERIA SPEC CULT: NORMAL
SPECIAL REQUESTS,SREQ: NORMAL

## 2023-03-26 PROBLEM — E86.0 DEHYDRATION: Status: RESOLVED | Noted: 2023-02-24 | Resolved: 2023-03-26

## 2023-05-18 RX ORDER — ASPIRIN 81 MG/1
81 TABLET ORAL DAILY
COMMUNITY

## 2023-05-18 RX ORDER — FUROSEMIDE 80 MG
80 TABLET ORAL 2 TIMES DAILY
COMMUNITY
Start: 2023-03-03

## 2023-05-18 RX ORDER — ATORVASTATIN CALCIUM 20 MG/1
20 TABLET, FILM COATED ORAL DAILY
COMMUNITY

## 2023-05-18 RX ORDER — PSEUDOEPHEDRINE HCL 30 MG
250 TABLET ORAL DAILY
COMMUNITY
Start: 2023-03-03

## 2023-05-18 RX ORDER — METOLAZONE 2.5 MG/1
2.5 TABLET ORAL
COMMUNITY
Start: 2022-12-21

## 2023-05-18 RX ORDER — SEVELAMER CARBONATE 800 MG/1
1600 TABLET, FILM COATED ORAL 3 TIMES DAILY
COMMUNITY

## 2023-05-18 RX ORDER — CARVEDILOL 12.5 MG/1
12.5 TABLET ORAL 2 TIMES DAILY
COMMUNITY

## 2023-06-27 LAB
BACTERIA ISLT: ABNORMAL
SPECIMEN SOURCE: ABNORMAL